# Patient Record
Sex: MALE | URBAN - NONMETROPOLITAN AREA
[De-identification: names, ages, dates, MRNs, and addresses within clinical notes are randomized per-mention and may not be internally consistent; named-entity substitution may affect disease eponyms.]

---

## 2018-07-17 ENCOUNTER — APPOINTMENT (OUTPATIENT)
Age: 63
Setting detail: DERMATOLOGY
End: 2018-07-17

## 2018-07-17 DIAGNOSIS — L82.0 INFLAMED SEBORRHEIC KERATOSIS: ICD-10-CM

## 2018-07-17 DIAGNOSIS — L91.8 OTHER HYPERTROPHIC DISORDERS OF THE SKIN: ICD-10-CM

## 2018-07-17 DIAGNOSIS — D22 MELANOCYTIC NEVI: ICD-10-CM

## 2018-07-17 PROBLEM — D22.9 MELANOCYTIC NEVI, UNSPECIFIED: Status: ACTIVE | Noted: 2018-07-17

## 2018-07-17 PROBLEM — D22.5 MELANOCYTIC NEVI OF TRUNK: Status: ACTIVE | Noted: 2018-07-17

## 2018-07-17 PROBLEM — D48.5 NEOPLASM OF UNCERTAIN BEHAVIOR OF SKIN: Status: ACTIVE | Noted: 2018-07-17

## 2018-07-17 PROCEDURE — OTHER REASSURANCE: OTHER

## 2018-07-17 PROCEDURE — 99203 OFFICE O/P NEW LOW 30 MIN: CPT | Mod: 25

## 2018-07-17 PROCEDURE — OTHER BIOPSY BY SHAVE METHOD: OTHER

## 2018-07-17 PROCEDURE — 11101: CPT

## 2018-07-17 PROCEDURE — 11100: CPT

## 2018-07-17 PROCEDURE — OTHER COUNSELING: OTHER

## 2018-07-17 PROCEDURE — OTHER MIPS QUALITY: OTHER

## 2018-07-17 ASSESSMENT — LOCATION SIMPLE DESCRIPTION DERM
LOCATION SIMPLE: RIGHT SHOULDER
LOCATION SIMPLE: LEFT ANTERIOR NECK
LOCATION SIMPLE: SCALP
LOCATION SIMPLE: RIGHT CLAVICULAR SKIN
LOCATION SIMPLE: LEFT UPPER BACK
LOCATION SIMPLE: RIGHT ANTERIOR NECK
LOCATION SIMPLE: LEFT CLAVICULAR SKIN

## 2018-07-17 ASSESSMENT — LOCATION DETAILED DESCRIPTION DERM
LOCATION DETAILED: RIGHT CLAVICULAR SKIN
LOCATION DETAILED: LEFT SUPERIOR MEDIAL UPPER BACK
LOCATION DETAILED: RIGHT CLAVICULAR NECK
LOCATION DETAILED: LEFT CLAVICULAR SKIN
LOCATION DETAILED: LEFT CLAVICULAR NECK
LOCATION DETAILED: RIGHT ANTERIOR SHOULDER
LOCATION DETAILED: LEFT INFERIOR OCCIPITAL SCALP

## 2018-07-17 ASSESSMENT — LOCATION ZONE DERM
LOCATION ZONE: ARM
LOCATION ZONE: SCALP
LOCATION ZONE: NECK
LOCATION ZONE: TRUNK

## 2018-07-17 NOTE — PROCEDURE: BIOPSY BY SHAVE METHOD
Size Of Lesion In Cm: 0
Bill 53421 For Specimen Handling/Conveyance To Laboratory?: no
Wound Care: Bacitracin
Was A Bandage Applied: Yes
Biopsy Type: H and E
Electrodesiccation Text: The wound bed was treated with electrodesiccation after the biopsy was performed.
Biopsy Method: Dermablade
Anesthesia Type: 1% lidocaine with epinephrine and a 1:10 solution of 8.4% sodium bicarbonate
Hemostasis: Aluminum Chloride
Electrodesiccation And Curettage Text: The wound bed was treated with electrodesiccation and curettage after the biopsy was performed.
Detail Level: Detailed
Post-Care Instructions: I reviewed with the patient in detail post-care instructions. Patient is to keep the biopsy site dry overnight, and then apply bacitracin twice daily until healed.
Curettage Text: The wound bed was treated with curettage after the biopsy was performed.
Silver Nitrate Text: The wound bed was treated with silver nitrate after the biopsy was performed.
Consent: Written consent was obtained and risks were reviewed including but not limited to scarring, infection, bleeding, scabbing, incomplete removal, nerve damage and allergy to anesthesia.
Billing Type: Third-Party Bill
Notification Instructions: Patient will be notified of biopsy results. However, patient instructed to call the office if not contacted within 2 weeks.
Cryotherapy Text: The wound bed was treated with cryotherapy after the biopsy was performed.
Depth Of Biopsy: dermis
Type Of Destruction Used: Curettage
Anesthesia Volume In Cc (Will Not Render If 0): 0.5
Dressing: bandage

## 2018-07-17 NOTE — PROCEDURE: MIPS QUALITY
Detail Level: Detailed
Quality 226: Preventive Care And Screening: Tobacco Use: Screening And Cessation Intervention: Patient screened for tobacco and never smoked
Quality 130: Documentation Of Current Medications In The Medical Record: Current Medications Documented
Additional Notes: Pt denies flu and pneumonia vaccine and not interested in receiving one at this time.
Quality 111:Pneumonia Vaccination Status For Older Adults: Pneumococcal Vaccination not Administered or Previously Received, Reason not Otherwise Specified
Quality 110: Preventive Care And Screening: Influenza Immunization: Influenza Immunization not Administered for Documented Reasons.

## 2018-07-25 ENCOUNTER — RX ONLY (RX ONLY)
Age: 63
End: 2018-07-25

## 2018-07-25 RX ORDER — BENZOYL PEROXIDE 100 MG/ML
LIQUID TOPICAL BID
Qty: 1 | Refills: 2 | Status: ERX | COMMUNITY
Start: 2018-07-25

## 2018-07-25 RX ORDER — CLINDAMYCIN PHOSPHATE 10 MG/G
GEL TOPICAL BID
Qty: 1 | Refills: 2 | Status: ERX | COMMUNITY
Start: 2018-07-25

## 2018-08-06 ENCOUNTER — APPOINTMENT (OUTPATIENT)
Age: 63
Setting detail: DERMATOLOGY
End: 2018-08-06

## 2018-08-06 DIAGNOSIS — Z48.02 ENCOUNTER FOR REMOVAL OF SUTURES: ICD-10-CM

## 2018-08-06 PROCEDURE — OTHER OBSERVATION: OTHER

## 2018-08-06 PROCEDURE — OTHER SUTURE REMOVAL (GLOBAL PERIOD): OTHER

## 2018-08-06 PROCEDURE — 99024 POSTOP FOLLOW-UP VISIT: CPT

## 2018-08-06 ASSESSMENT — LOCATION SIMPLE DESCRIPTION DERM
LOCATION SIMPLE: SCALP
LOCATION SIMPLE: LEFT UPPER BACK

## 2018-08-06 ASSESSMENT — LOCATION ZONE DERM
LOCATION ZONE: TRUNK
LOCATION ZONE: SCALP

## 2018-08-06 ASSESSMENT — LOCATION DETAILED DESCRIPTION DERM
LOCATION DETAILED: LEFT SUPERIOR MEDIAL UPPER BACK
LOCATION DETAILED: LEFT INFERIOR OCCIPITAL SCALP

## 2018-08-06 NOTE — PROCEDURE: SUTURE REMOVAL (GLOBAL PERIOD)
Detail Level: Detailed
Add 06310 Cpt? (Important Note: In 2017 The Use Of 69048 Is Being Tracked By Cms To Determine Future Global Period Reimbursement For Global Periods): yes

## 2020-11-05 NOTE — PROGRESS NOTES
"Patient: Carmelo Arnold    YOB: 1955    Date: 11/06/2020    Primary Care Provider: No primary care provider on file.    No chief complaint on file.      SUBJECTIVE:    History of present illness:  I saw the patient in the office today as a consultation for evaluation and treatment of ***    {Hx  Review:67620}    Review of Systems    History:  No past medical history on file.    No past surgical history on file.    No family history on file.    Social History     Tobacco Use   • Smoking status: Not on file   Substance Use Topics   • Alcohol use: Not on file   • Drug use: Not on file       Allergies:  Not on File    Medications:  No current outpatient medications on file.    OBJECTIVE:    Vital Signs:   There were no vitals filed for this visit.    Physical Exam:   General Appearance:    Alert, cooperative, in no acute distress   Head:    Normocephalic, without obvious abnormality, atraumatic   Eyes:            Lids and lashes normal, conjunctivae and sclerae normal, no   icterus, no pallor, corneas clear, PERRLA   Ears:    Ears appear intact with no abnormalities noted   Throat:   No oral lesions, no thrush, oral mucosa moist   Neck:   No adenopathy, supple, trachea midline, no thyromegaly, no   carotid bruit, no JVD   Lungs:     Clear to auscultation,respirations regular, even and                  unlabored    Heart:    Regular rhythm and normal rate, normal S1 and S2, no            murmur   Abdomen:     no masses, no organomegaly, soft non-tender, non-distended, no guarding, there is evidence of a ***   Extremities:   Moves all extremities well, no edema, no cyanosis, no             redness   Pulses:   Pulses palpable and equal bilaterally   Skin:   No bleeding, bruising or rash   Lymph nodes:   No palpable adenopathy   Neurologic:   Cranial nerves 2 - 12 grossly intact, sensation intact        Results Review:   {Results Review:86465::\"I reviewed the patient's new clinical results.\"}    {ROS " review:22079}    ASSESSMENT/PLAN:    No diagnosis found.    I had a detailed and extensive discussion with the patient in the office and they understand that they need to undergo hernia repair with mesh.  Full risks and benefits of operative versus nonoperative intervention were discussed with the patient and these included things such as nonresolution of symptoms and possible worsening of symptoms without surgical intervention versus infection, bleeding, possible recurrent hernia, possible postoperative neuralgia from nerve damage or involvement with scar tissue, etc.  The patient understands, agrees, and had no questions for me at the end of the office visit.     I discussed the patients findings and my recommendations with patient.    Electronically signed by Katrina Randall  11/05/20

## 2020-11-06 ENCOUNTER — OFFICE VISIT (OUTPATIENT)
Dept: SURGERY | Facility: CLINIC | Age: 65
End: 2020-11-06

## 2020-11-06 VITALS
BODY MASS INDEX: 22.84 KG/M2 | DIASTOLIC BLOOD PRESSURE: 70 MMHG | WEIGHT: 178 LBS | SYSTOLIC BLOOD PRESSURE: 122 MMHG | OXYGEN SATURATION: 99 % | HEIGHT: 74 IN | TEMPERATURE: 98.6 F | HEART RATE: 50 BPM

## 2020-11-06 DIAGNOSIS — K42.9 UMBILICAL HERNIA WITHOUT OBSTRUCTION AND WITHOUT GANGRENE: ICD-10-CM

## 2020-11-06 DIAGNOSIS — S39.011A STRAIN OF ABDOMINAL WALL, INITIAL ENCOUNTER: Primary | ICD-10-CM

## 2020-11-06 PROCEDURE — 99203 OFFICE O/P NEW LOW 30 MIN: CPT | Performed by: SURGERY

## 2020-11-06 RX ORDER — LEVOTHYROXINE SODIUM 0.07 MG/1
75 TABLET ORAL DAILY
COMMUNITY
End: 2021-07-28 | Stop reason: SDUPTHER

## 2020-11-06 RX ORDER — FINASTERIDE 5 MG/1
5 TABLET, FILM COATED ORAL DAILY
COMMUNITY
End: 2021-11-16

## 2020-11-06 RX ORDER — METOPROLOL SUCCINATE 25 MG/1
25 TABLET, EXTENDED RELEASE ORAL DAILY
COMMUNITY
End: 2021-06-08 | Stop reason: SDUPTHER

## 2020-11-06 RX ORDER — MONTELUKAST SODIUM 10 MG/1
10 TABLET ORAL DAILY
COMMUNITY
End: 2021-01-12

## 2020-11-06 RX ORDER — ROSUVASTATIN CALCIUM 10 MG/1
10 TABLET, COATED ORAL DAILY
COMMUNITY
End: 2021-04-20 | Stop reason: SDUPTHER

## 2020-11-06 NOTE — PROGRESS NOTES
Patient: Carmelo Arnold    YOB: 1955    Date: 11/06/2020    Primary Care Provider: Provider, No Known    Chief Complaint   Patient presents with   • Hernia       SUBJECTIVE:    History of present illness: Patient is in the office today for treatment and evaluation of a ventral and possible inguinal hernia.  Patient was moving recently and developed lower abdominal pain on both sides.  Special prolonged standing and walking.  Patient has abdominal diastases and umbilical hernia.  They are minimally symptomatic.  No change in bowel habits and recent colonoscopy was unremarkable.  No weight loss or anemia.  No rectal bleeding.    The following portions of the patient's history were reviewed and updated as appropriate: allergies, current medications, past family history, past medical history, past social history, past surgical history and problem list.      Review of Systems   Constitutional: Negative for chills, fever and unexpected weight change.   HENT: Negative for trouble swallowing and voice change.    Eyes: Negative for visual disturbance.   Respiratory: Negative for apnea, cough, chest tightness and wheezing.    Cardiovascular: Negative for chest pain, palpitations and leg swelling.   Gastrointestinal: Negative for abdominal distention, abdominal pain, anal bleeding, blood in stool, constipation, diarrhea, nausea, rectal pain and vomiting.   Endocrine: Negative for cold intolerance and heat intolerance.   Genitourinary: Negative for difficulty urinating, dysuria, flank pain and hematuria.   Musculoskeletal: Negative for back pain, gait problem and joint swelling.   Skin: Negative for color change, rash and wound.   Neurological: Negative for dizziness, syncope, speech difficulty, weakness, numbness and headaches.   Hematological: Negative for adenopathy. Does not bruise/bleed easily.   Psychiatric/Behavioral: Negative for confusion. The patient is not nervous/anxious.        Allergies:  No Known  "Allergies    Medications:    Current Outpatient Medications:   •  finasteride (PROSCAR) 5 MG tablet, finasteride 5 mg tablet, Disp: , Rfl:   •  levothyroxine (SYNTHROID, LEVOTHROID) 75 MCG tablet, levothyroxine 75 mcg tablet, Disp: , Rfl:   •  metoprolol succinate XL (TOPROL-XL) 25 MG 24 hr tablet, metoprolol succinate ER 25 mg tablet,extended release 24 hr, Disp: , Rfl:   •  montelukast (SINGULAIR) 10 MG tablet, montelukast 10 mg tablet, Disp: , Rfl:   •  rosuvastatin (CRESTOR) 10 MG tablet, rosuvastatin 10 mg tablet, Disp: , Rfl:     History:  Past Medical History:   Diagnosis Date   • Colon polyp        No past surgical history on file.    No family history on file.    Social History     Tobacco Use   • Smoking status: Never Smoker   • Smokeless tobacco: Never Used   Substance Use Topics   • Alcohol use: Never     Frequency: Never   • Drug use: Never        OBJECTIVE:    Vital Signs:   Vitals:    11/06/20 1535   BP: 122/70   Pulse: 50   Temp: 98.6 °F (37 °C)   SpO2: 99%   Weight: 80.7 kg (178 lb)   Height: 188 cm (74\")       Physical Exam:   General Appearance:    Alert, cooperative, in no acute distress   Head:    Normocephalic, without obvious abnormality, atraumatic   Eyes:            Lids and lashes normal, conjunctivae and sclerae normal, no   icterus, no pallor, corneas clear, PERRLA   Ears:    Ears appear intact with no abnormalities noted   Throat:   No oral lesions, no thrush, oral mucosa moist   Neck:   No adenopathy, supple, trachea midline, no thyromegaly, no   carotid bruit, no JVD   Lungs:     Clear to auscultation,respirations regular, even and                  unlabored    Heart:    Regular rhythm and normal rate, normal S1 and S2, no            murmur, no gallop, no rub, no click   Chest Wall:    No abnormalities observed   Abdomen:     Normal bowel sounds, no masses, no organomegaly, soft        non-tender, non-distended, no guarding, no rebound                Tenderness.  Small umbilical " hernia, reducible.  Upper midline diastases, nontender.  No palpable inguinal hernias.   Extremities:   Moves all extremities well, no edema, no cyanosis, no             redness   Pulses:   Pulses palpable and equal bilaterally   Skin:   No bleeding, bruising or rash   Lymph nodes:   No palpable adenopathy   Neurologic:   Cranial nerves 2 - 12 grossly intact, sensation intact, DTR       present and equal bilaterally     Results Review:   I reviewed the patient's new clinical results.    Review of Systems was reviewed and confirmed as accurate as documented by the MA.    ASSESSMENT/PLAN:    1. Strain of abdominal wall, initial encounter    2. Umbilical hernia without obstruction and without gangrene        Patient reassured, appears to have abdominal wall strain, recommend rest heat and ibuprofen.  Follow-up in 4 weeks if no improvement.  No evidence of hernia in the groin regions.    I discussed the patients findings and my recommendations with patient        Electronically signed by Negrita Herrera MD  11/06/20

## 2021-01-12 ENCOUNTER — OFFICE VISIT (OUTPATIENT)
Dept: INTERNAL MEDICINE | Facility: CLINIC | Age: 66
End: 2021-01-12

## 2021-01-12 VITALS
SYSTOLIC BLOOD PRESSURE: 126 MMHG | DIASTOLIC BLOOD PRESSURE: 72 MMHG | HEART RATE: 73 BPM | BODY MASS INDEX: 28.16 KG/M2 | HEIGHT: 74 IN | OXYGEN SATURATION: 99 % | WEIGHT: 219.4 LBS | TEMPERATURE: 97.5 F

## 2021-01-12 DIAGNOSIS — T78.40XD ALLERGY, SUBSEQUENT ENCOUNTER: ICD-10-CM

## 2021-01-12 DIAGNOSIS — E53.8 B12 DEFICIENCY: ICD-10-CM

## 2021-01-12 DIAGNOSIS — R00.2 PALPITATION: ICD-10-CM

## 2021-01-12 DIAGNOSIS — G89.29 CHRONIC PAIN OF RIGHT KNEE: ICD-10-CM

## 2021-01-12 DIAGNOSIS — I49.1 PAC (PREMATURE ATRIAL CONTRACTION): ICD-10-CM

## 2021-01-12 DIAGNOSIS — N40.1 BENIGN PROSTATIC HYPERPLASIA WITH LOWER URINARY TRACT SYMPTOMS, SYMPTOM DETAILS UNSPECIFIED: ICD-10-CM

## 2021-01-12 DIAGNOSIS — K43.9 VENTRAL HERNIA WITHOUT OBSTRUCTION OR GANGRENE: ICD-10-CM

## 2021-01-12 DIAGNOSIS — R06.02 SOB (SHORTNESS OF BREATH) ON EXERTION: ICD-10-CM

## 2021-01-12 DIAGNOSIS — E55.9 VITAMIN D DEFICIENCY: ICD-10-CM

## 2021-01-12 DIAGNOSIS — M25.561 CHRONIC PAIN OF RIGHT KNEE: ICD-10-CM

## 2021-01-12 DIAGNOSIS — E03.9 HYPOTHYROIDISM, UNSPECIFIED TYPE: Primary | ICD-10-CM

## 2021-01-12 DIAGNOSIS — K63.5 BENIGN COLON POLYP: ICD-10-CM

## 2021-01-12 DIAGNOSIS — E78.5 HYPERLIPIDEMIA, UNSPECIFIED HYPERLIPIDEMIA TYPE: ICD-10-CM

## 2021-01-12 PROBLEM — T78.40XA ALLERGY: Status: ACTIVE | Noted: 2021-01-12

## 2021-01-12 PROCEDURE — 99204 OFFICE O/P NEW MOD 45 MIN: CPT | Performed by: INTERNAL MEDICINE

## 2021-01-12 RX ORDER — MELATONIN
1000 DAILY
COMMUNITY
End: 2021-03-11

## 2021-01-12 RX ORDER — LANOLIN ALCOHOL/MO/W.PET/CERES
1000 CREAM (GRAM) TOPICAL DAILY
COMMUNITY

## 2021-01-12 NOTE — PROGRESS NOTES
Subjective   Carmelo Arnold is a 65 y.o. male.     Chief Complaint   Patient presents with   • Establish Care   • Knee Pain     onset a week ago, pt c/o knee pain       History of Present Illness   Patient here for establishing care.  Patient complains some knee joint pain popping sensation occasional aching sensation.  Pain is chronic.  Patient also has history of BPH on medication.  Hypothyroidism stable on medication.  Hyperlipidemia stable on medication.  Vitamin deficiency on supplement is stable.  Patient also complains of some skipped beats palpitation in the past.  No significant short of breath upon exertion.    Current Outpatient Medications:   •  cholecalciferol (VITAMIN D3) 25 MCG (1000 UT) tablet, Take 1,000 Units by mouth Daily., Disp: , Rfl:   •  finasteride (PROSCAR) 5 MG tablet, Take 5 mg by mouth Daily., Disp: , Rfl:   •  levothyroxine (SYNTHROID, LEVOTHROID) 75 MCG tablet, Take 75 mcg by mouth Daily., Disp: , Rfl:   •  metoprolol succinate XL (TOPROL-XL) 25 MG 24 hr tablet, Take 25 mg by mouth Daily., Disp: , Rfl:   •  rosuvastatin (CRESTOR) 10 MG tablet, Take 10 mg by mouth Daily., Disp: , Rfl:   •  vitamin B-12 (CYANOCOBALAMIN) 1000 MCG tablet, Take 1,000 mcg by mouth Daily., Disp: , Rfl:     The following portions of the patient's history were reviewed and updated as appropriate: allergies, current medications, past family history, past medical history, past social history, past surgical history and problem list.    Review of Systems   Constitutional: Negative.    HENT: Negative.    Eyes: Negative.    Respiratory: Negative.    Cardiovascular: Negative.    Gastrointestinal: Negative.    Endocrine: Negative.    Genitourinary: Negative.    Musculoskeletal: Negative.    Skin: Negative.    Allergic/Immunologic: Negative.    Neurological: Negative.    Hematological: Negative.    Psychiatric/Behavioral: Negative.    All other systems reviewed and are negative.      Objective   Physical Exam  Neck:       Musculoskeletal: Neck supple.   Cardiovascular:      Rate and Rhythm: Normal rate and regular rhythm.      Heart sounds: Normal heart sounds.   Pulmonary:      Effort: Pulmonary effort is normal.      Breath sounds: Normal breath sounds.   Abdominal:      General: Bowel sounds are normal.   Skin:     General: Skin is warm.   Neurological:      Mental Status: He is alert and oriented to person, place, and time.         Cbc, cmp, flp tsh from [facility]  have been reviewed.    Assessment/Plan   Diagnoses and all orders for this visit:    Hypothyroidism, unspecified type continue medication    Hyperlipidemia, unspecified hyperlipidemia type continue medication    Vitamin D deficiency continue supplement need the blood tests next    Benign prostatic hyperplasia with lower urinary tract symptoms, symptom details unspecified continue medication patient requests to be referred to another urologist  -     Ambulatory Referral to Urology    B12 deficiency check lab next    Allergy, subsequent encounter discontinue Singulair change to Allegra patient does have nighttime rhinorrhea occasionally    Ventral hernia without obstruction or gangrene seen by general surgeon no surgical indication    PAC (premature atrial contraction) history of work-up by cardiologist need the records    Benign colon polyp patient states colonoscopy October 2020 need the records    Chronic pain of right knee counseling for exercise of the knees    Palpitation obtain records        Other orders  -     vitamin B-12 (CYANOCOBALAMIN) 1000 MCG tablet; Take 1,000 mcg by mouth Daily.  -     cholecalciferol (VITAMIN D3) 25 MCG (1000 UT) tablet; Take 1,000 Units by mouth Daily.    2 mo review records , wellness done 10/2020    May need to do more tests need to review old records next time to formulate treatment plan.

## 2021-03-11 ENCOUNTER — OFFICE VISIT (OUTPATIENT)
Dept: UROLOGY | Facility: CLINIC | Age: 66
End: 2021-03-11

## 2021-03-11 ENCOUNTER — LAB (OUTPATIENT)
Dept: LAB | Facility: HOSPITAL | Age: 66
End: 2021-03-11

## 2021-03-11 VITALS
OXYGEN SATURATION: 94 % | WEIGHT: 211 LBS | SYSTOLIC BLOOD PRESSURE: 126 MMHG | HEART RATE: 76 BPM | TEMPERATURE: 97.1 F | HEIGHT: 74 IN | DIASTOLIC BLOOD PRESSURE: 70 MMHG | BODY MASS INDEX: 27.08 KG/M2

## 2021-03-11 DIAGNOSIS — R97.20 ELEVATED PSA: ICD-10-CM

## 2021-03-11 DIAGNOSIS — N40.0 BENIGN PROSTATIC HYPERPLASIA, UNSPECIFIED WHETHER LOWER URINARY TRACT SYMPTOMS PRESENT: Primary | ICD-10-CM

## 2021-03-11 LAB
BILIRUB BLD-MCNC: NEGATIVE MG/DL
CLARITY, POC: CLEAR
COLOR UR: YELLOW
GLUCOSE UR STRIP-MCNC: NEGATIVE MG/DL
KETONES UR QL: NEGATIVE
LEUKOCYTE EST, POC: NEGATIVE
NITRITE UR-MCNC: NEGATIVE MG/ML
PH UR: 6 [PH] (ref 5–8)
PROT UR STRIP-MCNC: NEGATIVE MG/DL
RBC # UR STRIP: NEGATIVE /UL
SP GR UR: 1.02 (ref 1–1.03)
UROBILINOGEN UR QL: NORMAL

## 2021-03-11 PROCEDURE — 84154 ASSAY OF PSA FREE: CPT

## 2021-03-11 PROCEDURE — 99204 OFFICE O/P NEW MOD 45 MIN: CPT | Performed by: UROLOGY

## 2021-03-11 PROCEDURE — 84153 ASSAY OF PSA TOTAL: CPT

## 2021-03-11 PROCEDURE — 51798 US URINE CAPACITY MEASURE: CPT | Performed by: UROLOGY

## 2021-03-11 PROCEDURE — 36415 COLL VENOUS BLD VENIPUNCTURE: CPT

## 2021-03-11 PROCEDURE — 81003 URINALYSIS AUTO W/O SCOPE: CPT | Performed by: UROLOGY

## 2021-03-11 NOTE — PROGRESS NOTES
"Chief Complaint  LUTS    Referring Provider  Salas Meléndez MD    HPI  Mr. Arnold is a 65 y.o. male with history of PAT and urinary symptoms on finasteride for approximately 1 year who presents with urinary frequency. He denies burning or blood when urinating. He denies family history of prostate cancer. He reports that his \"PSA was high,\" when checked by his previous physician in [place]. He adds that a prostate biopsy was performed 8 years ago, and nothing was found. He reports that he had an ultrasound of his prostate 2 years ago. He also had another biopsy 15 years ago, and reports that nothing was found.     Primary symptom includes: Urinary frequency  Patient denies   Onset was 1 year ago .    Previous treatments include: finasteride    IPSS Questionnaire (AUA-7):  Over the past month…    1)  Incomplete Emptying  How often have you had a sensation of not emptying your bladder?  1 - Less than 1 time in 5   2)  Frequency  How often have you had to urinate less than every two hours? 3 - About half the time   3)  Intermittency  How often have you found you stopped and started again several times when you urinated?  1 - Less than 1 time in 5   4) Urgency  How often have you found it difficult to postpone urination?  0 - Not at all   5) Weak Stream  How often have you had a weak urinary stream?  1 - Less than 1 time in 5   6) Straining  How often have you had to push or strain to begin urination?  1 - Less than 1 time in 5   7) Nocturia  How many times did you typically get up at night to urinate?  1 - 1 time   Total Score:  8       Quality of life due to urinary symptoms:  If you were to spend the rest of your life with your urinary condition the way it is now, how would you feel about that? 2-Mostly Satisfied   Urine Leakage (Incontinence) 0-No Leakage       Past Medical History  Past Medical History:   Diagnosis Date   • Acid reflux    • Benign colon polyp 1/12/2021   • Colon polyp    • Colon polyp    • Heart " "murmur        Past Surgical History  Past Surgical History:   Procedure Laterality Date   • COLONOSCOPY         Medications    Current Outpatient Medications:   •  finasteride (PROSCAR) 5 MG tablet, Take 5 mg by mouth Daily., Disp: , Rfl:   •  levothyroxine (SYNTHROID, LEVOTHROID) 75 MCG tablet, Take 75 mcg by mouth Daily., Disp: , Rfl:   •  metoprolol succinate XL (TOPROL-XL) 25 MG 24 hr tablet, Take 25 mg by mouth Daily., Disp: , Rfl:   •  rosuvastatin (CRESTOR) 10 MG tablet, Take 10 mg by mouth Daily., Disp: , Rfl:   •  vitamin B-12 (CYANOCOBALAMIN) 1000 MCG tablet, Take 1,000 mcg by mouth Daily., Disp: , Rfl:     Allergies  No Known Allergies    Social History  Social History     Socioeconomic History   • Marital status:      Spouse name: Not on file   • Number of children: Not on file   • Years of education: Not on file   • Highest education level: Not on file   Tobacco Use   • Smoking status: Never Smoker   • Smokeless tobacco: Never Used   Substance and Sexual Activity   • Alcohol use: Yes     Comment: Rarely    • Drug use: Never   • Sexual activity: Defer       Family History  He has no family history of prostate cancer  Family History   Problem Relation Age of Onset   • Arthritis Mother    • Breast cancer Sister    • Migraines Daughter        Review of Systems  A review of systems was notable for lower urinary tract symptoms.    Physical Exam  Visit Vitals  /70   Pulse 76   Temp 97.1 °F (36.2 °C)   Ht 188 cm (74\")   Wt 95.7 kg (211 lb)   SpO2 94%   BMI 27.09 kg/m²     Physical exam notable for abdominal scars and a small umbilical hernia.    Genitourinary  Penis: circumcised penis, glans normal, no penile discharge.  No rashes/lesions.    Testes: descended bilaterally, no masses, nontender to palpation. Remainder of scrotal contents normal. No hernia appreciated.  Rectal:  Normal tone, no masses.  Prostate:  30 grams.  Symmetric, non-tender, anodular and no induration.      Labs  No results " found for: PSA     On review of outside records, his PSA on 09/17/2019 was 6.9. The free was 1.5, the percent free was 21.7.    Brief Urine Lab Results  (Last result in the past 365 days)      Color   Clarity   Blood   Leuk Est   Nitrite   Protein   CREAT   Urine HCG        03/11/21 1613 Yellow Clear Negative Negative Negative Negative             PVR  Post-void residual performed by staff - 0 mL.      Assessment  Mr. Arnold is a 65 y.o. male who presents with LUTS, primarily frequency and urgency.  He has a history of elevated PSA and reported prior negative biopsies over 8 years ago.  His elevated PSA at this point from the previous urologist is of unclear significance to me.    Plan  1.  Repeat total and free PSA  2. Follow up in 2 weeks to discuss MRI of prostate and possible biopsy in the future.     Scribed for Eduardo Pandya MD by VIVIANE REYES.  03/12/21   08:58 EST    I have personally performed the services described in this document as scribed by the above individual, and it is both accurate and complete.  Eduardo Pandya MD  3/12/2021  15:54 EST

## 2021-03-13 LAB
PSA FREE MFR SERPL: 22.6 %
PSA FREE SERPL-MCNC: 0.7 NG/ML
PSA SERPL-MCNC: 3.1 NG/ML (ref 0–4)

## 2021-03-15 ENCOUNTER — OFFICE VISIT (OUTPATIENT)
Dept: INTERNAL MEDICINE | Facility: CLINIC | Age: 66
End: 2021-03-15

## 2021-03-15 VITALS
TEMPERATURE: 96.7 F | WEIGHT: 217 LBS | BODY MASS INDEX: 27.85 KG/M2 | OXYGEN SATURATION: 98 % | SYSTOLIC BLOOD PRESSURE: 122 MMHG | HEART RATE: 76 BPM | DIASTOLIC BLOOD PRESSURE: 80 MMHG | HEIGHT: 74 IN

## 2021-03-15 DIAGNOSIS — M25.561 CHRONIC PAIN OF RIGHT KNEE: ICD-10-CM

## 2021-03-15 DIAGNOSIS — G89.29 CHRONIC PAIN OF RIGHT KNEE: ICD-10-CM

## 2021-03-15 DIAGNOSIS — E53.8 B12 DEFICIENCY: ICD-10-CM

## 2021-03-15 DIAGNOSIS — I49.1 PAC (PREMATURE ATRIAL CONTRACTION): ICD-10-CM

## 2021-03-15 DIAGNOSIS — E03.9 HYPOTHYROIDISM, UNSPECIFIED TYPE: ICD-10-CM

## 2021-03-15 DIAGNOSIS — E55.9 VITAMIN D DEFICIENCY: ICD-10-CM

## 2021-03-15 DIAGNOSIS — T78.40XD ALLERGY, SUBSEQUENT ENCOUNTER: Primary | ICD-10-CM

## 2021-03-15 DIAGNOSIS — N40.1 BENIGN PROSTATIC HYPERPLASIA WITH LOWER URINARY TRACT SYMPTOMS, SYMPTOM DETAILS UNSPECIFIED: ICD-10-CM

## 2021-03-15 DIAGNOSIS — E78.5 HYPERLIPIDEMIA, UNSPECIFIED HYPERLIPIDEMIA TYPE: ICD-10-CM

## 2021-03-15 PROBLEM — R00.2 PALPITATION: Status: RESOLVED | Noted: 2021-01-12 | Resolved: 2021-03-15

## 2021-03-15 PROCEDURE — 99214 OFFICE O/P EST MOD 30 MIN: CPT | Performed by: INTERNAL MEDICINE

## 2021-03-15 NOTE — PROGRESS NOTES
Subjective   Carmelo Arnold is a 65 y.o. male.     Chief Complaint   Patient presents with   • Thyroid Problem   • Hypertension   • Hyperlipidemia       History of Present Illness     Patient here for follow-up of.  Allergies stable.  Hyperlipidemia stable on medication needed blood tests.  Vitamin D stable on supplement.  Hypothyroidism stable on medication need blood tests.  BPH history of a high PSA normal now patient is seeing urology.  Knee joint pain stable improved.  No more palpitation.  History of a PAC on metoprolol improved.    Current Outpatient Medications:   •  levothyroxine (SYNTHROID, LEVOTHROID) 75 MCG tablet, Take 75 mcg by mouth Daily., Disp: , Rfl:   •  metoprolol succinate XL (TOPROL-XL) 25 MG 24 hr tablet, Take 25 mg by mouth Daily., Disp: , Rfl:   •  rosuvastatin (CRESTOR) 10 MG tablet, Take 10 mg by mouth Daily., Disp: , Rfl:   •  vitamin B-12 (CYANOCOBALAMIN) 1000 MCG tablet, Take 1,000 mcg by mouth Daily., Disp: , Rfl:   •  finasteride (PROSCAR) 5 MG tablet, Take 5 mg by mouth Daily., Disp: , Rfl:     The following portions of the patient's history were reviewed and updated as appropriate: allergies, current medications, past family history, past medical history, past social history, past surgical history and problem list.    Review of Systems   Constitutional: Negative.    Respiratory: Negative.    Cardiovascular: Negative.    Gastrointestinal: Negative.    Musculoskeletal: Negative.    Skin: Negative.    Neurological: Negative.    Psychiatric/Behavioral: Negative.        Objective   Physical Exam  Cardiovascular:      Rate and Rhythm: Normal rate and regular rhythm.      Heart sounds: Normal heart sounds.   Pulmonary:      Effort: Pulmonary effort is normal.      Breath sounds: Normal breath sounds.   Abdominal:      General: Bowel sounds are normal.   Musculoskeletal:      Cervical back: Neck supple.   Skin:     General: Skin is warm.   Neurological:      Mental Status: He is alert and  oriented to person, place, and time.         All tests have been reviewed.    Assessment/Plan   Diagnoses and all orders for this visit:    Allergy, subsequent encounter continue medication    Hyperlipidemia, unspecified hyperlipidemia type continue medication  -     Comprehensive Metabolic Panel  -     CK  -     CBC & Differential  -     Lipid Panel    Vitamin D deficiency continue supplement  -     Vitamin D 25 Hydroxy    Hypothyroidism, unspecified type continue medication  -     TSH    Benign prostatic hyperplasia with lower urinary tract symptoms, symptom details unspecified follow-up with urology for history of high PSA    Chronic pain of right knee improved    PAC (premature atrial contraction) continue metoprolol    1 months follow-up physical in October         Below is to historical records for reference only: Ventral hernia without obstruction or gangrene seen by general surgeon no surgical indication     PAC (premature atrial contraction) history of work-up by cardiologist on metoprolol stable now     Benign colon polyp patient states colonoscopy 2/2020      Chronic pain of right knee counseling for exercise of the knees     PAC stable on med      colon done 2/2020  Over the records reviewed       wellness 10/2021, vaccination done please see record

## 2021-03-16 LAB
25(OH)D3+25(OH)D2 SERPL-MCNC: 38.6 NG/ML (ref 30–100)
ALBUMIN SERPL-MCNC: 4.1 G/DL (ref 3.5–5.2)
ALBUMIN/GLOB SERPL: 1.6 G/DL
ALP SERPL-CCNC: 52 U/L (ref 39–117)
ALT SERPL-CCNC: 14 U/L (ref 1–41)
AST SERPL-CCNC: 16 U/L (ref 1–40)
BASOPHILS # BLD AUTO: 0.04 10*3/MM3 (ref 0–0.2)
BASOPHILS NFR BLD AUTO: 0.6 % (ref 0–1.5)
BILIRUB SERPL-MCNC: 0.4 MG/DL (ref 0–1.2)
BUN SERPL-MCNC: 17 MG/DL (ref 8–23)
BUN/CREAT SERPL: 16.7 (ref 7–25)
CALCIUM SERPL-MCNC: 9.1 MG/DL (ref 8.6–10.5)
CHLORIDE SERPL-SCNC: 103 MMOL/L (ref 98–107)
CHOLEST SERPL-MCNC: 168 MG/DL (ref 0–200)
CK SERPL-CCNC: 149 U/L (ref 20–200)
CO2 SERPL-SCNC: 27.6 MMOL/L (ref 22–29)
CREAT SERPL-MCNC: 1.02 MG/DL (ref 0.76–1.27)
EOSINOPHIL # BLD AUTO: 0.3 10*3/MM3 (ref 0–0.4)
EOSINOPHIL NFR BLD AUTO: 4.7 % (ref 0.3–6.2)
ERYTHROCYTE [DISTWIDTH] IN BLOOD BY AUTOMATED COUNT: 13.4 % (ref 12.3–15.4)
GLOBULIN SER CALC-MCNC: 2.5 GM/DL
GLUCOSE SERPL-MCNC: 116 MG/DL (ref 65–99)
HCT VFR BLD AUTO: 42.4 % (ref 37.5–51)
HDLC SERPL-MCNC: 47 MG/DL (ref 40–60)
HGB BLD-MCNC: 14.3 G/DL (ref 13–17.7)
IMM GRANULOCYTES # BLD AUTO: 0.03 10*3/MM3 (ref 0–0.05)
IMM GRANULOCYTES NFR BLD AUTO: 0.5 % (ref 0–0.5)
LDLC SERPL CALC-MCNC: 92 MG/DL (ref 0–100)
LYMPHOCYTES # BLD AUTO: 1.98 10*3/MM3 (ref 0.7–3.1)
LYMPHOCYTES NFR BLD AUTO: 30.9 % (ref 19.6–45.3)
MCH RBC QN AUTO: 29.1 PG (ref 26.6–33)
MCHC RBC AUTO-ENTMCNC: 33.7 G/DL (ref 31.5–35.7)
MCV RBC AUTO: 86.4 FL (ref 79–97)
MONOCYTES # BLD AUTO: 0.72 10*3/MM3 (ref 0.1–0.9)
MONOCYTES NFR BLD AUTO: 11.3 % (ref 5–12)
NEUTROPHILS # BLD AUTO: 3.33 10*3/MM3 (ref 1.7–7)
NEUTROPHILS NFR BLD AUTO: 52 % (ref 42.7–76)
NRBC BLD AUTO-RTO: 0 /100 WBC (ref 0–0.2)
PLATELET # BLD AUTO: 228 10*3/MM3 (ref 140–450)
POTASSIUM SERPL-SCNC: 4.4 MMOL/L (ref 3.5–5.2)
PROT SERPL-MCNC: 6.6 G/DL (ref 6–8.5)
RBC # BLD AUTO: 4.91 10*6/MM3 (ref 4.14–5.8)
SODIUM SERPL-SCNC: 140 MMOL/L (ref 136–145)
TRIGL SERPL-MCNC: 167 MG/DL (ref 0–150)
TSH SERPL DL<=0.005 MIU/L-ACNC: 4.18 UIU/ML (ref 0.27–4.2)
VLDLC SERPL CALC-MCNC: 29 MG/DL (ref 5–40)
WBC # BLD AUTO: 6.4 10*3/MM3 (ref 3.4–10.8)

## 2021-03-26 ENCOUNTER — OFFICE VISIT (OUTPATIENT)
Dept: UROLOGY | Facility: CLINIC | Age: 66
End: 2021-03-26

## 2021-03-26 DIAGNOSIS — R97.20 ELEVATED PSA: Primary | ICD-10-CM

## 2021-03-26 PROCEDURE — 99441 PR PHYS/QHP TELEPHONE EVALUATION 5-10 MIN: CPT | Performed by: UROLOGY

## 2021-03-26 NOTE — PROGRESS NOTES
"Chief Complaint  LUTS    Referring Provider  No ref. provider found    HPI  Mr. Arnold is a 65 y.o. male with history of PAT and urinary symptoms on finasteride for approximately 1 year who presents with urinary frequency. He denies burning or blood when urinating. He denies family history of prostate cancer. He reports that his \"PSA was high,\" when checked by his previous physician in [place]. He adds that a prostate biopsy was performed 8 years ago, and nothing was found. He reports that he had an ultrasound of his prostate 2 years ago. He also had another biopsy 15 years ago, and reports that nothing was found.     Primary symptom includes: Urinary frequency  Patient denies   Onset was 1 year ago .    Previous treatments include: finasteride    IPSS Questionnaire (AUA-7):  Over the past month…    1)  Incomplete Emptying  How often have you had a sensation of not emptying your bladder?  1 - Less than 1 time in 5   2)  Frequency  How often have you had to urinate less than every two hours? 3 - About half the time   3)  Intermittency  How often have you found you stopped and started again several times when you urinated?  1 - Less than 1 time in 5   4) Urgency  How often have you found it difficult to postpone urination?  0 - Not at all   5) Weak Stream  How often have you had a weak urinary stream?  1 - Less than 1 time in 5   6) Straining  How often have you had to push or strain to begin urination?  1 - Less than 1 time in 5   7) Nocturia  How many times did you typically get up at night to urinate?  1 - 1 time   Total Score:  8       Quality of life due to urinary symptoms:  If you were to spend the rest of your life with your urinary condition the way it is now, how would you feel about that? 2-Mostly Satisfied   Urine Leakage (Incontinence) 0-No Leakage       Past Medical History  Past Medical History:   Diagnosis Date   • Acid reflux    • Benign colon polyp 1/12/2021   • Colon polyp    • Colon polyp    • " Heart murmur        Past Surgical History  Past Surgical History:   Procedure Laterality Date   • COLONOSCOPY         Medications    Current Outpatient Medications:   •  finasteride (PROSCAR) 5 MG tablet, Take 5 mg by mouth Daily., Disp: , Rfl:   •  levothyroxine (SYNTHROID, LEVOTHROID) 75 MCG tablet, Take 75 mcg by mouth Daily., Disp: , Rfl:   •  metoprolol succinate XL (TOPROL-XL) 25 MG 24 hr tablet, Take 25 mg by mouth Daily., Disp: , Rfl:   •  rosuvastatin (CRESTOR) 10 MG tablet, Take 10 mg by mouth Daily., Disp: , Rfl:   •  vitamin B-12 (CYANOCOBALAMIN) 1000 MCG tablet, Take 1,000 mcg by mouth Daily., Disp: , Rfl:     Allergies  No Known Allergies    Social History  Social History     Socioeconomic History   • Marital status:      Spouse name: Not on file   • Number of children: Not on file   • Years of education: Not on file   • Highest education level: Not on file   Tobacco Use   • Smoking status: Never Smoker   • Smokeless tobacco: Never Used   Substance and Sexual Activity   • Alcohol use: Yes     Comment: Rarely    • Drug use: Never   • Sexual activity: Defer       Family History  He has no family history of prostate cancer  Family History   Problem Relation Age of Onset   • Arthritis Mother    • Breast cancer Sister    • Migraines Daughter        Review of Systems  A review of systems was notable for lower urinary tract symptoms.    Physical Exam  There were no vitals taken for this visit.      Labs  Lab Results   Component Value Date    PSA 3.1 03/11/2021        On review of outside records, his PSA on 09/17/2019 was 6.9. The free was 1.5, the percent free was 21.7.    Brief Urine Lab Results  (Last result in the past 365 days)      Color   Clarity   Blood   Leuk Est   Nitrite   Protein   CREAT   Urine HCG        03/11/21 1613 Yellow Clear Negative Negative Negative Negative                   Assessment  Mr. Arnold is a 65 y.o. male who presents with LUTS, primarily frequency and urgency.  He has a  history of elevated PSA and reported prior negative biopsies over 8 years ago.  His elevated PSA at this point from the previous urologist is of unclear significance to me.    Repeat corrected PSA is 6.2 based on the fact that he is on finasteride.    Plan  1.   MRI of prostate and possible biopsy in the future.     This visit has been rescheduled as a phone visit to comply with patient safety concerns in accordance with CDC recommendations. Total time of discussion was 5 minutes.

## 2021-04-20 ENCOUNTER — OFFICE VISIT (OUTPATIENT)
Dept: INTERNAL MEDICINE | Facility: CLINIC | Age: 66
End: 2021-04-20

## 2021-04-20 VITALS
SYSTOLIC BLOOD PRESSURE: 140 MMHG | HEART RATE: 54 BPM | DIASTOLIC BLOOD PRESSURE: 90 MMHG | BODY MASS INDEX: 28.11 KG/M2 | HEIGHT: 74 IN | TEMPERATURE: 97.7 F | WEIGHT: 219 LBS | OXYGEN SATURATION: 99 %

## 2021-04-20 DIAGNOSIS — M25.561 CHRONIC PAIN OF RIGHT KNEE: ICD-10-CM

## 2021-04-20 DIAGNOSIS — K43.9 VENTRAL HERNIA WITHOUT OBSTRUCTION OR GANGRENE: ICD-10-CM

## 2021-04-20 DIAGNOSIS — E03.9 HYPOTHYROIDISM, UNSPECIFIED TYPE: ICD-10-CM

## 2021-04-20 DIAGNOSIS — E78.5 HYPERLIPIDEMIA, UNSPECIFIED HYPERLIPIDEMIA TYPE: ICD-10-CM

## 2021-04-20 DIAGNOSIS — G89.29 CHRONIC PAIN OF RIGHT KNEE: ICD-10-CM

## 2021-04-20 DIAGNOSIS — E53.8 B12 DEFICIENCY: ICD-10-CM

## 2021-04-20 DIAGNOSIS — Z00.00 WELLNESS EXAMINATION: ICD-10-CM

## 2021-04-20 DIAGNOSIS — N40.1 BENIGN PROSTATIC HYPERPLASIA WITH LOWER URINARY TRACT SYMPTOMS, SYMPTOM DETAILS UNSPECIFIED: ICD-10-CM

## 2021-04-20 DIAGNOSIS — E55.9 VITAMIN D DEFICIENCY: ICD-10-CM

## 2021-04-20 DIAGNOSIS — T78.40XD ALLERGY, SUBSEQUENT ENCOUNTER: Primary | ICD-10-CM

## 2021-04-20 DIAGNOSIS — I49.1 PAC (PREMATURE ATRIAL CONTRACTION): ICD-10-CM

## 2021-04-20 PROCEDURE — 99214 OFFICE O/P EST MOD 30 MIN: CPT | Performed by: INTERNAL MEDICINE

## 2021-04-20 RX ORDER — ROSUVASTATIN CALCIUM 10 MG/1
10 TABLET, COATED ORAL DAILY
Qty: 90 TABLET | Refills: 3 | Status: SHIPPED | OUTPATIENT
Start: 2021-04-20 | End: 2022-05-16 | Stop reason: SDUPTHER

## 2021-04-20 NOTE — PROGRESS NOTES
Subjective   Carmelo Arnold is a 65 y.o. male.     Chief Complaint   Patient presents with       • Hypothyroidism   • Hypertension   • Hyperlipidemia       History of Present Illness   Patient here for follow-up.  Allergy stable on medication.  Hyperlipidemia stable on medication.  Vitamin D deficiency stable on supplement.  BPH stable patient is seeing urologist for PSA elevation.  Knee joint pain stable now.  Arrhythmia stable also.  Blood pressure borderline.  Sugar slightly elevated.    Current Outpatient Medications:   •  levothyroxine (SYNTHROID, LEVOTHROID) 75 MCG tablet, Take 75 mcg by mouth Daily., Disp: , Rfl:   •  metoprolol succinate XL (TOPROL-XL) 25 MG 24 hr tablet, Take 25 mg by mouth Daily., Disp: , Rfl:   •  finasteride (PROSCAR) 5 MG tablet, Take 5 mg by mouth Daily., Disp: , Rfl:   •  rosuvastatin (CRESTOR) 10 MG tablet, Take 10 mg by mouth Daily., Disp: , Rfl:   •  vitamin B-12 (CYANOCOBALAMIN) 1000 MCG tablet, Take 1,000 mcg by mouth Daily., Disp: , Rfl:     The following portions of the patient's history were reviewed and updated as appropriate: allergies, current medications, past family history, past medical history, past social history, past surgical history and problem list.    Review of Systems   Constitutional: Negative.    Respiratory: Negative.    Cardiovascular: Negative.    Gastrointestinal: Negative.    Musculoskeletal: Negative.    Skin: Negative.    Neurological: Negative.    Psychiatric/Behavioral: Negative.        Objective   Physical Exam  Cardiovascular:      Rate and Rhythm: Normal rate and regular rhythm.      Heart sounds: Normal heart sounds.   Pulmonary:      Effort: Pulmonary effort is normal.      Breath sounds: Normal breath sounds.   Abdominal:      General: Bowel sounds are normal.   Musculoskeletal:      Cervical back: Neck supple.   Skin:     General: Skin is warm.   Neurological:      Mental Status: He is alert and oriented to person, place, and time.         All tests  have been reviewed.    Assessment/Plan   Diagnoses and all orders for this visit:    Allergy, subsequent encounter continue medication    Hyperlipidemia, cont med     Vitamin D deficiency continue supplement    Hypothyroidism, cont med    B12 deficiency continue supplement    Ventral hernia without obstruction or gangrene    Benign prostatic hyperplasia with lower urinary tract symptoms, symptom details unspecified PSA high follow uro    Chronic pain of right knee improvee    PAC (premature atrial contraction) stable and occa    BP borderline watch for now, low salt diet    Hyperglycemia good diet       6 mo wellness

## 2021-04-29 ENCOUNTER — HOSPITAL ENCOUNTER (OUTPATIENT)
Dept: MRI IMAGING | Facility: HOSPITAL | Age: 66
End: 2021-04-29

## 2021-05-11 ENCOUNTER — HOSPITAL ENCOUNTER (OUTPATIENT)
Dept: MRI IMAGING | Facility: HOSPITAL | Age: 66
End: 2021-05-11

## 2021-06-08 RX ORDER — METOPROLOL SUCCINATE 25 MG/1
25 TABLET, EXTENDED RELEASE ORAL DAILY
Qty: 90 TABLET | Refills: 3 | Status: SHIPPED | OUTPATIENT
Start: 2021-06-08 | End: 2022-06-15 | Stop reason: SDUPTHER

## 2021-06-14 ENCOUNTER — HOSPITAL ENCOUNTER (OUTPATIENT)
Dept: MRI IMAGING | Facility: HOSPITAL | Age: 66
Discharge: HOME | End: 2021-06-14
Admitting: UROLOGY

## 2021-06-14 DIAGNOSIS — R97.20 ELEVATED PSA: ICD-10-CM

## 2021-06-14 PROCEDURE — 72197 MRI PELVIS W/O & W/DYE: CPT

## 2021-06-14 PROCEDURE — A9577 INJ MULTIHANCE: HCPCS | Performed by: UROLOGY

## 2021-06-14 PROCEDURE — 0 GADOBENATE DIMEGLUMINE 529 MG/ML SOLUTION: Performed by: UROLOGY

## 2021-06-14 RX ADMIN — GADOBENATE DIMEGLUMINE 19 ML: 529 INJECTION, SOLUTION INTRAVENOUS at 09:29

## 2021-06-18 ENCOUNTER — OFFICE VISIT (OUTPATIENT)
Dept: UROLOGY | Facility: CLINIC | Age: 66
End: 2021-06-18

## 2021-06-18 DIAGNOSIS — R97.20 ELEVATED PSA: Primary | ICD-10-CM

## 2021-06-18 PROCEDURE — 99442 PR PHYS/QHP TELEPHONE EVALUATION 11-20 MIN: CPT | Performed by: UROLOGY

## 2021-06-18 NOTE — PROGRESS NOTES
I had a 13-minute telephone conversation with the patient about his MRI results.    MRI Prostate With & Without Contrast  Result Date: 6/15/2021  PI-RADS 2 low probability of a clinically significant prostate carcinoma.    This report was finalized on 6/15/2021 8:18 AM by Sharla Nice M.D..    Since he did not have any significant lesions on MRI, we elected to continue to follow his PSA with a total, free, and percent free PSA in 1 year.  We discussed the risk, benefits, and alternatives to this approach.

## 2021-07-19 ENCOUNTER — PATIENT MESSAGE (OUTPATIENT)
Dept: INTERNAL MEDICINE | Facility: CLINIC | Age: 66
End: 2021-07-19

## 2021-07-23 LAB
ALBUMIN SERPL-MCNC: 4.1 G/DL (ref 3.5–5.2)
ALBUMIN/GLOB SERPL: 1.6 G/DL
ALP SERPL-CCNC: 64 U/L (ref 39–117)
ALT SERPL-CCNC: 21 U/L (ref 1–41)
AST SERPL-CCNC: 18 U/L (ref 1–40)
BASOPHILS # BLD AUTO: 0.06 10*3/MM3 (ref 0–0.2)
BASOPHILS NFR BLD AUTO: 0.8 % (ref 0–1.5)
BILIRUB SERPL-MCNC: 0.4 MG/DL (ref 0–1.2)
BUN SERPL-MCNC: 16 MG/DL (ref 8–23)
BUN/CREAT SERPL: 16 (ref 7–25)
CALCIUM SERPL-MCNC: 9.3 MG/DL (ref 8.6–10.5)
CHLORIDE SERPL-SCNC: 105 MMOL/L (ref 98–107)
CHOLEST SERPL-MCNC: 184 MG/DL (ref 0–200)
CK SERPL-CCNC: 137 U/L (ref 20–200)
CO2 SERPL-SCNC: 26.1 MMOL/L (ref 22–29)
CREAT SERPL-MCNC: 1 MG/DL (ref 0.76–1.27)
EOSINOPHIL # BLD AUTO: 0.37 10*3/MM3 (ref 0–0.4)
EOSINOPHIL NFR BLD AUTO: 4.6 % (ref 0.3–6.2)
ERYTHROCYTE [DISTWIDTH] IN BLOOD BY AUTOMATED COUNT: 13.4 % (ref 12.3–15.4)
GLOBULIN SER CALC-MCNC: 2.6 GM/DL
GLUCOSE SERPL-MCNC: 91 MG/DL (ref 65–99)
HBA1C MFR BLD: 5.3 % (ref 4.8–5.6)
HCT VFR BLD AUTO: 41.7 % (ref 37.5–51)
HDLC SERPL-MCNC: 39 MG/DL (ref 40–60)
HGB BLD-MCNC: 14.1 G/DL (ref 13–17.7)
IMM GRANULOCYTES # BLD AUTO: 0.06 10*3/MM3 (ref 0–0.05)
IMM GRANULOCYTES NFR BLD AUTO: 0.8 % (ref 0–0.5)
LDLC SERPL CALC-MCNC: 115 MG/DL (ref 0–100)
LYMPHOCYTES # BLD AUTO: 3.02 10*3/MM3 (ref 0.7–3.1)
LYMPHOCYTES NFR BLD AUTO: 37.8 % (ref 19.6–45.3)
MCH RBC QN AUTO: 29.1 PG (ref 26.6–33)
MCHC RBC AUTO-ENTMCNC: 33.8 G/DL (ref 31.5–35.7)
MCV RBC AUTO: 86.2 FL (ref 79–97)
MONOCYTES # BLD AUTO: 0.72 10*3/MM3 (ref 0.1–0.9)
MONOCYTES NFR BLD AUTO: 9 % (ref 5–12)
NEUTROPHILS # BLD AUTO: 3.76 10*3/MM3 (ref 1.7–7)
NEUTROPHILS NFR BLD AUTO: 47 % (ref 42.7–76)
NRBC BLD AUTO-RTO: 0 /100 WBC (ref 0–0.2)
PLATELET # BLD AUTO: 247 10*3/MM3 (ref 140–450)
POTASSIUM SERPL-SCNC: 4.5 MMOL/L (ref 3.5–5.2)
PROT SERPL-MCNC: 6.7 G/DL (ref 6–8.5)
RBC # BLD AUTO: 4.84 10*6/MM3 (ref 4.14–5.8)
SODIUM SERPL-SCNC: 143 MMOL/L (ref 136–145)
TRIGL SERPL-MCNC: 170 MG/DL (ref 0–150)
TSH SERPL DL<=0.005 MIU/L-ACNC: 5.92 UIU/ML (ref 0.27–4.2)
VIT B12 SERPL-MCNC: 871 PG/ML (ref 211–946)
VLDLC SERPL CALC-MCNC: 30 MG/DL (ref 5–40)
WBC # BLD AUTO: 7.99 10*3/MM3 (ref 3.4–10.8)

## 2021-07-28 ENCOUNTER — TELEPHONE (OUTPATIENT)
Dept: INTERNAL MEDICINE | Facility: CLINIC | Age: 66
End: 2021-07-28

## 2021-07-28 DIAGNOSIS — E03.9 HYPOTHYROIDISM, UNSPECIFIED TYPE: Primary | ICD-10-CM

## 2021-07-28 RX ORDER — LEVOTHYROXINE SODIUM 88 UG/1
TABLET ORAL
Qty: 30 TABLET | Refills: 1 | Status: SHIPPED | OUTPATIENT
Start: 2021-07-28 | End: 2021-09-29 | Stop reason: SDUPTHER

## 2021-07-28 NOTE — TELEPHONE ENCOUNTER
Attempted to contact patient; left detailed message per release notifying of dose change and provider's instructions to repeat labs in 6 weeks. [employer] message also sent to patient

## 2021-07-28 NOTE — TELEPHONE ENCOUNTER
Patient is requesting refill of Levothyroxine but you needed him to complete labs before refill. Patient had labs completed 07/23/2021 with abnormal results. Please advise and adjust medication dose if appropriate and I will contact patient.     TSH:: 5.920

## 2021-09-16 LAB — TSH SERPL DL<=0.005 MIU/L-ACNC: 4.41 UIU/ML (ref 0.27–4.2)

## 2021-09-29 DIAGNOSIS — E03.9 HYPOTHYROIDISM, UNSPECIFIED TYPE: ICD-10-CM

## 2021-09-29 RX ORDER — LEVOTHYROXINE SODIUM 88 UG/1
TABLET ORAL
Qty: 30 TABLET | Refills: 1 | Status: SHIPPED | OUTPATIENT
Start: 2021-09-29 | End: 2021-11-26 | Stop reason: SDUPTHER

## 2021-09-29 NOTE — TELEPHONE ENCOUNTER
Rx Refill Note  Requested Prescriptions     Pending Prescriptions Disp Refills   • levothyroxine (SYNTHROID, LEVOTHROID) 88 MCG tablet 30 tablet 1     Si daily po      Last office visit with prescribing clinician: 2021      Next office visit with prescribing clinician: 10/20/2021            Noni Hwang MA  21, 10:15 EDT

## 2021-11-16 ENCOUNTER — OFFICE VISIT (OUTPATIENT)
Dept: INTERNAL MEDICINE | Facility: CLINIC | Age: 66
End: 2021-11-16

## 2021-11-16 VITALS
SYSTOLIC BLOOD PRESSURE: 132 MMHG | DIASTOLIC BLOOD PRESSURE: 82 MMHG | OXYGEN SATURATION: 98 % | HEART RATE: 60 BPM | HEIGHT: 74 IN | WEIGHT: 216 LBS | BODY MASS INDEX: 27.72 KG/M2 | TEMPERATURE: 97.3 F

## 2021-11-16 DIAGNOSIS — G89.29 CHRONIC PAIN OF RIGHT KNEE: ICD-10-CM

## 2021-11-16 DIAGNOSIS — E78.5 HYPERLIPIDEMIA, UNSPECIFIED HYPERLIPIDEMIA TYPE: ICD-10-CM

## 2021-11-16 DIAGNOSIS — E03.9 HYPOTHYROIDISM, UNSPECIFIED TYPE: ICD-10-CM

## 2021-11-16 DIAGNOSIS — I49.1 PAC (PREMATURE ATRIAL CONTRACTION): ICD-10-CM

## 2021-11-16 DIAGNOSIS — E55.9 VITAMIN D DEFICIENCY, UNSPECIFIED: ICD-10-CM

## 2021-11-16 DIAGNOSIS — E55.9 VITAMIN D DEFICIENCY: ICD-10-CM

## 2021-11-16 DIAGNOSIS — E53.8 B12 DEFICIENCY: ICD-10-CM

## 2021-11-16 DIAGNOSIS — Z00.00 WELLNESS EXAMINATION: ICD-10-CM

## 2021-11-16 DIAGNOSIS — N40.1 BENIGN PROSTATIC HYPERPLASIA WITH LOWER URINARY TRACT SYMPTOMS, SYMPTOM DETAILS UNSPECIFIED: ICD-10-CM

## 2021-11-16 DIAGNOSIS — K43.9 VENTRAL HERNIA WITHOUT OBSTRUCTION OR GANGRENE: ICD-10-CM

## 2021-11-16 DIAGNOSIS — R73.9 HYPERGLYCEMIA: ICD-10-CM

## 2021-11-16 DIAGNOSIS — K63.5 BENIGN COLON POLYP: ICD-10-CM

## 2021-11-16 DIAGNOSIS — M25.561 CHRONIC PAIN OF RIGHT KNEE: ICD-10-CM

## 2021-11-16 DIAGNOSIS — T78.40XD ALLERGY, SUBSEQUENT ENCOUNTER: Primary | ICD-10-CM

## 2021-11-16 PROCEDURE — 1170F FXNL STATUS ASSESSED: CPT | Performed by: INTERNAL MEDICINE

## 2021-11-16 PROCEDURE — G0439 PPPS, SUBSEQ VISIT: HCPCS | Performed by: INTERNAL MEDICINE

## 2021-11-16 PROCEDURE — 1159F MED LIST DOCD IN RCRD: CPT | Performed by: INTERNAL MEDICINE

## 2021-11-16 NOTE — PROGRESS NOTES
The ABCs of the Annual Wellness Visit  Subsequent Medicare Wellness Visit    Chief Complaint   Patient presents with   • Medicare Wellness-subsequent      Subjective    History of Present Illness:  Carmelo Arnold is a 66 y.o. male who presents for a Subsequent Medicare Wellness Visit.    The following portions of the patient's history were reviewed and   updated as appropriate: allergies, current medications, past family history, past medical history, past social history, past surgical history and problem list.    Compared to one year ago, the patient feels his physical   health is the same.    Compared to one year ago, the patient feels his mental   health is the same.    Recent Hospitalizations:  He was not admitted to the hospital during the last year.       Current Medical Providers:  Patient Care Team:  Salas Meléndez MD as PCP - General (Internal Medicine)  Negrita Herrera MD as Consulting Physician (General Surgery)  Negrita Herrera MD as Consulting Physician (General Surgery)    Outpatient Medications Prior to Visit   Medication Sig Dispense Refill   • levothyroxine (SYNTHROID, LEVOTHROID) 88 MCG tablet 1 daily po 30 tablet 1   • metoprolol succinate XL (TOPROL-XL) 25 MG 24 hr tablet Take 1 tablet by mouth Daily. 90 tablet 3   • rosuvastatin (CRESTOR) 10 MG tablet Take 1 tablet by mouth Daily. 90 tablet 3   • vitamin B-12 (CYANOCOBALAMIN) 1000 MCG tablet Take 1,000 mcg by mouth Daily.     • finasteride (PROSCAR) 5 MG tablet Take 5 mg by mouth Daily.       No facility-administered medications prior to visit.       No opioid medication identified on active medication list. I have reviewed chart for other potential  high risk medication/s and harmful drug interactions in the elderly.          Aspirin is not on active medication list.  Aspirin use is not indicated based on review of current medical condition/s. Risk of harm outweighs potential benefits.  .    Patient Active Problem List   Diagnosis   • Vitamin D  "deficiency   • Benign prostatic hyperplasia with lower urinary tract symptoms   • Hypothyroidism   • Allergy   • Hyperlipidemia   • B12 deficiency   • PAC (premature atrial contraction)   • Ventral hernia   • Benign colon polyp   • Chronic pain of right knee   • Hyperglycemia     Advance Care Planning  Advance Directive is not on file.  ACP discussion was held with the patient during this visit. Patient does not have an advance directive, information provided.    Review of Systems   All other systems reviewed and are negative.       Objective    Vitals:    11/16/21 1634   BP: 132/82   Pulse: 60   Temp: 97.3 °F (36.3 °C)   SpO2: 98%   Weight: 98 kg (216 lb)   Height: 188 cm (74\")     BMI Readings from Last 1 Encounters:   11/16/21 27.73 kg/m²   BMI is above normal parameters. Recommendations include: exercise counseling    Does the patient have evidence of cognitive impairment? No    Physical Exam  Constitutional:       Appearance: He is well-developed.   Cardiovascular:      Rate and Rhythm: Normal rate and regular rhythm.      Heart sounds: Normal heart sounds.   Pulmonary:      Effort: Pulmonary effort is normal.      Breath sounds: Normal breath sounds.   Abdominal:      General: Bowel sounds are normal.      Palpations: Abdomen is soft.   Musculoskeletal:      Cervical back: Neck supple.   Neurological:      Mental Status: He is alert and oriented to person, place, and time.   Psychiatric:         Behavior: Behavior normal.                 HEALTH RISK ASSESSMENT    Smoking Status:  Social History     Tobacco Use   Smoking Status Never Smoker   Smokeless Tobacco Never Used     Alcohol Consumption:  Social History     Substance and Sexual Activity   Alcohol Use Yes   • Alcohol/week: 0.0 standard drinks    Comment: Rarely      Fall Risk Screen:    STEADI Fall Risk Assessment was completed, and patient is at LOW risk for falls.Assessment completed on:3/15/2021    Depression Screening:  PHQ-2/PHQ-9 Depression " Screening 11/16/2021   Little interest or pleasure in doing things 0   Feeling down, depressed, or hopeless 0   Total Score 0       Health Habits and Functional and Cognitive Screening:  Functional & Cognitive Status 11/16/2021   Do you have difficulty preparing food and eating? No   Do you have difficulty bathing yourself, getting dressed or grooming yourself? No   Do you have difficulty using the toilet? No   Do you have difficulty moving around from place to place? No   Do you have trouble with steps or getting out of a bed or a chair? No   Current Diet Well Balanced Diet   Dental Exam Up to date        Dental Exam Comment 09/2021   Eye Exam Not up to date        Eye Exam Comment 2019   Exercise (times per week) 0 times per week   Current Exercises Include No Regular Exercise   Current Exercise Activities Include -   Do you need help using the phone?  No   Are you deaf or do you have serious difficulty hearing?  No   Do you need help with transportation? No   Do you need help shopping? No   Do you need help preparing meals?  No   Do you need help with housework?  No   Do you need help with laundry? No   Do you need help taking your medications? No   Do you need help managing money? No   Do you ever drive or ride in a car without wearing a seat belt? No   Have you felt unusual stress, anger or loneliness in the last month? No   Who do you live with? Spouse   If you need help, do you have trouble finding someone available to you? No   Have you been bothered in the last four weeks by sexual problems? No   Do you have difficulty concentrating, remembering or making decisions? No       Age-appropriate Screening Schedule:  Refer to the list below for future screening recommendations based on patient's age, sex and/or medical conditions. Orders for these recommended tests are listed in the plan section. The patient has been provided with a written plan.    Health Maintenance   Topic Date Due   • ZOSTER VACCINE (1 of 2)  Never done   • INFLUENZA VACCINE  Never done   • LIPID PANEL  07/23/2022   • TDAP/TD VACCINES (2 - Td or Tdap) 05/19/2026              Assessment/Plan   CMS Preventative Services Quick Reference  Risk Factors Identified During Encounter  Inactivity/Sedentary  The above risks/problems have been discussed with the patient.  Follow up actions/plans if indicated are seen below in the Assessment/Plan Section.  Pertinent information has been shared with the patient in the After Visit Summary.    Diagnoses and all orders for this visit:    1. Allergy, subsequent encounter (Primary)    2. PAC (premature atrial contraction)    3. Hyperlipidemia, unspecified hyperlipidemia type    4. Vitamin D deficiency    5. Hypothyroidism, unspecified type    6. B12 deficiency    7. Ventral hernia without obstruction or gangrene    8. Benign colon polyp    9. Benign prostatic hyperplasia with lower urinary tract symptoms, symptom details unspecified    10. Chronic pain of right knee    11. Hyperglycemia    12. Wellness examination  -     CBC & Differential  -     Comprehensive Metabolic Panel  -     Lipid Panel  -     TSH  -     Vitamin B12  -     Vitamin D 25 Hydroxy  -     CK  -     Hemoglobin A1c  -     Hepatitis C Antibody    13. Vitamin D deficiency, unspecified   -     Vitamin D 25 Hydroxy        Follow Up:   No follow-ups on file.     An After Visit Summary and PPPS were made available to the patient.

## 2021-11-20 LAB
25(OH)D3+25(OH)D2 SERPL-MCNC: 41.9 NG/ML
ALBUMIN SERPL-MCNC: 4.5 G/DL (ref 3.5–5.2)
ALBUMIN/GLOB SERPL: 1.9 G/DL
ALP SERPL-CCNC: 59 U/L (ref 39–117)
ALT SERPL-CCNC: 21 U/L (ref 1–41)
AST SERPL-CCNC: 12 U/L (ref 1–40)
BASOPHILS # BLD AUTO: 0.07 10*3/MM3 (ref 0–0.2)
BASOPHILS NFR BLD AUTO: 0.9 % (ref 0–1.5)
BILIRUB SERPL-MCNC: 0.4 MG/DL (ref 0–1.2)
BUN SERPL-MCNC: 16 MG/DL (ref 8–23)
BUN/CREAT SERPL: 13.7 (ref 7–25)
CALCIUM SERPL-MCNC: 9.4 MG/DL (ref 8.6–10.5)
CHLORIDE SERPL-SCNC: 105 MMOL/L (ref 98–107)
CHOLEST SERPL-MCNC: 194 MG/DL (ref 0–200)
CK SERPL-CCNC: 89 U/L (ref 20–200)
CO2 SERPL-SCNC: 26.2 MMOL/L (ref 22–29)
CREAT SERPL-MCNC: 1.17 MG/DL (ref 0.76–1.27)
EOSINOPHIL # BLD AUTO: 0.27 10*3/MM3 (ref 0–0.4)
EOSINOPHIL NFR BLD AUTO: 3.6 % (ref 0.3–6.2)
ERYTHROCYTE [DISTWIDTH] IN BLOOD BY AUTOMATED COUNT: 13.7 % (ref 12.3–15.4)
GLOBULIN SER CALC-MCNC: 2.4 GM/DL
GLUCOSE SERPL-MCNC: 93 MG/DL (ref 65–99)
HBA1C MFR BLD: 5.4 % (ref 4.8–5.6)
HCT VFR BLD AUTO: 46 % (ref 37.5–51)
HCV AB S/CO SERPL IA: <0.1 S/CO RATIO (ref 0–0.9)
HDLC SERPL-MCNC: 51 MG/DL (ref 40–60)
HGB BLD-MCNC: 15.1 G/DL (ref 13–17.7)
IMM GRANULOCYTES # BLD AUTO: 0.03 10*3/MM3 (ref 0–0.05)
IMM GRANULOCYTES NFR BLD AUTO: 0.4 % (ref 0–0.5)
LDLC SERPL CALC-MCNC: 112 MG/DL (ref 0–100)
LYMPHOCYTES # BLD AUTO: 1.63 10*3/MM3 (ref 0.7–3.1)
LYMPHOCYTES NFR BLD AUTO: 21.9 % (ref 19.6–45.3)
MCH RBC QN AUTO: 28.9 PG (ref 26.6–33)
MCHC RBC AUTO-ENTMCNC: 32.8 G/DL (ref 31.5–35.7)
MCV RBC AUTO: 88.1 FL (ref 79–97)
MONOCYTES # BLD AUTO: 0.65 10*3/MM3 (ref 0.1–0.9)
MONOCYTES NFR BLD AUTO: 8.7 % (ref 5–12)
NEUTROPHILS # BLD AUTO: 4.8 10*3/MM3 (ref 1.7–7)
NEUTROPHILS NFR BLD AUTO: 64.5 % (ref 42.7–76)
NRBC BLD AUTO-RTO: 0 /100 WBC (ref 0–0.2)
PLATELET # BLD AUTO: 215 10*3/MM3 (ref 140–450)
POTASSIUM SERPL-SCNC: 4.7 MMOL/L (ref 3.5–5.2)
PROT SERPL-MCNC: 6.9 G/DL (ref 6–8.5)
RBC # BLD AUTO: 5.22 10*6/MM3 (ref 4.14–5.8)
SODIUM SERPL-SCNC: 142 MMOL/L (ref 136–145)
TRIGL SERPL-MCNC: 179 MG/DL (ref 0–150)
TSH SERPL DL<=0.005 MIU/L-ACNC: 2.65 UIU/ML (ref 0.27–4.2)
VIT B12 SERPL-MCNC: 872 PG/ML (ref 211–946)
VLDLC SERPL CALC-MCNC: 31 MG/DL (ref 5–40)
WBC # BLD AUTO: 7.45 10*3/MM3 (ref 3.4–10.8)

## 2021-11-26 DIAGNOSIS — E03.9 HYPOTHYROIDISM, UNSPECIFIED TYPE: ICD-10-CM

## 2021-11-29 RX ORDER — LEVOTHYROXINE SODIUM 88 UG/1
TABLET ORAL
Qty: 90 TABLET | Refills: 3 | Status: SHIPPED | OUTPATIENT
Start: 2021-11-29 | End: 2022-06-15 | Stop reason: SDUPTHER

## 2021-11-29 NOTE — TELEPHONE ENCOUNTER
Rx Refill Note  Requested Prescriptions     Pending Prescriptions Disp Refills   • levothyroxine (SYNTHROID, LEVOTHROID) 88 MCG tablet 30 tablet 1     Si daily po      Last office visit with prescribing clinician: 2021      Next office visit with prescribing clinician: 2022            Noni Hwang MA  21, 10:47 EST     Last labs 2021   TSH 2.650

## 2022-05-16 ENCOUNTER — OFFICE VISIT (OUTPATIENT)
Dept: INTERNAL MEDICINE | Facility: CLINIC | Age: 67
End: 2022-05-16

## 2022-05-16 VITALS
WEIGHT: 215 LBS | DIASTOLIC BLOOD PRESSURE: 80 MMHG | SYSTOLIC BLOOD PRESSURE: 130 MMHG | TEMPERATURE: 97.3 F | BODY MASS INDEX: 27.59 KG/M2 | HEART RATE: 75 BPM | OXYGEN SATURATION: 99 % | HEIGHT: 74 IN

## 2022-05-16 DIAGNOSIS — R97.20 ELEVATED PSA: Primary | ICD-10-CM

## 2022-05-16 DIAGNOSIS — I49.1 PAC (PREMATURE ATRIAL CONTRACTION): ICD-10-CM

## 2022-05-16 DIAGNOSIS — E53.8 B12 DEFICIENCY: ICD-10-CM

## 2022-05-16 DIAGNOSIS — E03.9 HYPOTHYROIDISM, UNSPECIFIED TYPE: Primary | ICD-10-CM

## 2022-05-16 DIAGNOSIS — F32.A DEPRESSION, UNSPECIFIED DEPRESSION TYPE: ICD-10-CM

## 2022-05-16 DIAGNOSIS — E78.5 HYPERLIPIDEMIA, UNSPECIFIED HYPERLIPIDEMIA TYPE: ICD-10-CM

## 2022-05-16 LAB
ALBUMIN SERPL-MCNC: 4.1 G/DL (ref 3.5–5.2)
ALBUMIN/GLOB SERPL: 1.4 G/DL
ALP SERPL-CCNC: 59 U/L (ref 39–117)
ALT SERPL-CCNC: 17 U/L (ref 1–41)
AST SERPL-CCNC: 16 U/L (ref 1–40)
BILIRUB SERPL-MCNC: 0.5 MG/DL (ref 0–1.2)
BUN SERPL-MCNC: 13 MG/DL (ref 8–23)
BUN/CREAT SERPL: 12 (ref 7–25)
CALCIUM SERPL-MCNC: 9.3 MG/DL (ref 8.6–10.5)
CHLORIDE SERPL-SCNC: 107 MMOL/L (ref 98–107)
CHOLEST SERPL-MCNC: 180 MG/DL (ref 0–200)
CK SERPL-CCNC: 209 U/L (ref 20–200)
CO2 SERPL-SCNC: 21.6 MMOL/L (ref 22–29)
CREAT SERPL-MCNC: 1.08 MG/DL (ref 0.76–1.27)
EGFRCR SERPLBLD CKD-EPI 2021: 74.7 ML/MIN/1.73
GLOBULIN SER CALC-MCNC: 3 GM/DL
GLUCOSE SERPL-MCNC: 99 MG/DL (ref 65–99)
HDLC SERPL-MCNC: 50 MG/DL (ref 40–60)
LDLC SERPL CALC-MCNC: 108 MG/DL (ref 0–100)
POTASSIUM SERPL-SCNC: 4.3 MMOL/L (ref 3.5–5.2)
PROT SERPL-MCNC: 7.1 G/DL (ref 6–8.5)
PSA SERPL-MCNC: 7.63 NG/ML (ref 0–4)
SODIUM SERPL-SCNC: 139 MMOL/L (ref 136–145)
TRIGL SERPL-MCNC: 125 MG/DL (ref 0–150)
TSH SERPL DL<=0.005 MIU/L-ACNC: 2.37 UIU/ML (ref 0.27–4.2)
VLDLC SERPL CALC-MCNC: 22 MG/DL (ref 5–40)

## 2022-05-16 PROCEDURE — 99214 OFFICE O/P EST MOD 30 MIN: CPT | Performed by: INTERNAL MEDICINE

## 2022-05-16 RX ORDER — ROSUVASTATIN CALCIUM 10 MG/1
10 TABLET, COATED ORAL DAILY
Qty: 90 TABLET | Refills: 3 | Status: SHIPPED | OUTPATIENT
Start: 2022-05-16

## 2022-05-16 NOTE — PROGRESS NOTES
Subjective   Carmelo Arnold is a 66 y.o. male.     Chief Complaint   Patient presents with   • Follow-up   • Hypertension   • Hyperlipidemia   • Hypothyroidism       History of Present Illness   patient here for follow-up. Blood pressure stable medication hyperlipidemia stable medication hypothyroidism stable medication. Patient complains depressed from time to time usually mild denies any anxiety no suicidal attempt.    Current Outpatient Medications:   •  levothyroxine (SYNTHROID, LEVOTHROID) 88 MCG tablet, 1 daily po, Disp: 90 tablet, Rfl: 3  •  metoprolol succinate XL (TOPROL-XL) 25 MG 24 hr tablet, Take 1 tablet by mouth Daily., Disp: 90 tablet, Rfl: 3  •  rosuvastatin (CRESTOR) 10 MG tablet, Take 1 tablet by mouth Daily., Disp: 90 tablet, Rfl: 3  •  vitamin B-12 (CYANOCOBALAMIN) 1000 MCG tablet, Take 1,000 mcg by mouth Daily., Disp: , Rfl:     The following portions of the patient's history were reviewed and updated as appropriate: allergies, current medications, past family history, past medical history, past social history, past surgical history and problem list.    Review of Systems   Constitutional: Negative.    Respiratory: Negative.    Cardiovascular: Negative.    Gastrointestinal: Negative.    Musculoskeletal: Negative.    Skin: Negative.    Neurological: Negative.    Psychiatric/Behavioral:        Depressed mood the sometime       Objective   Physical Exam  Cardiovascular:      Rate and Rhythm: Normal rate and regular rhythm.      Heart sounds: Normal heart sounds.   Pulmonary:      Effort: Pulmonary effort is normal.      Breath sounds: Normal breath sounds.   Abdominal:      General: Bowel sounds are normal.   Musculoskeletal:      Cervical back: Neck supple.   Skin:     General: Skin is warm.   Neurological:      Mental Status: He is alert and oriented to person, place, and time.   Psychiatric:      Comments: Depressed mood some time          All tests have been reviewed.    Assessment & Plan   Diagnoses  and all orders for this visit:    Hypothyroidism, unspecified type  Cont med  -     TSH    Hyperlipidemia, unspecified hyperlipidemia type cont med and needs lab monitor   -     rosuvastatin (CRESTOR) 10 MG tablet; Take 1 tablet by mouth Daily.  -     CK  -     Comprehensive Metabolic Panel  -     Lipid Panel    PAC (premature atrial contraction) cont med     B12 deficiency stable     Depressed mood PQH9 =5 watch for now counseling today     6 mo wellness

## 2022-06-15 DIAGNOSIS — E03.9 HYPOTHYROIDISM, UNSPECIFIED TYPE: ICD-10-CM

## 2022-06-16 RX ORDER — METOPROLOL SUCCINATE 25 MG/1
25 TABLET, EXTENDED RELEASE ORAL DAILY
Qty: 90 TABLET | Refills: 3 | Status: SHIPPED | OUTPATIENT
Start: 2022-06-16 | End: 2023-03-14

## 2022-06-16 RX ORDER — LEVOTHYROXINE SODIUM 88 UG/1
TABLET ORAL
Qty: 90 TABLET | Refills: 3 | Status: SHIPPED | OUTPATIENT
Start: 2022-06-16 | End: 2023-03-14

## 2022-06-16 NOTE — TELEPHONE ENCOUNTER
Rx Refill Note  Requested Prescriptions     Pending Prescriptions Disp Refills   • metoprolol succinate XL (TOPROL-XL) 25 MG 24 hr tablet 90 tablet 3     Sig: Take 1 tablet by mouth Daily.   • levothyroxine (SYNTHROID, LEVOTHROID) 88 MCG tablet 90 tablet 3     Si daily po      Last office visit with prescribing clinician: 2022      Next office visit with prescribing clinician: 2022            Noni Hwang MA  22, 08:09 EDT     Last labs 2022   TSH: 2.370

## 2022-06-20 ENCOUNTER — OFFICE VISIT (OUTPATIENT)
Dept: UROLOGY | Facility: CLINIC | Age: 67
End: 2022-06-20

## 2022-06-20 VITALS
WEIGHT: 214 LBS | HEIGHT: 74 IN | SYSTOLIC BLOOD PRESSURE: 148 MMHG | TEMPERATURE: 97.1 F | DIASTOLIC BLOOD PRESSURE: 83 MMHG | HEART RATE: 78 BPM | BODY MASS INDEX: 27.46 KG/M2 | OXYGEN SATURATION: 99 %

## 2022-06-20 DIAGNOSIS — R97.20 ELEVATED PSA: Primary | ICD-10-CM

## 2022-06-20 LAB
BILIRUB BLD-MCNC: NEGATIVE MG/DL
CLARITY, POC: CLEAR
COLOR UR: YELLOW
EXPIRATION DATE: NORMAL
GLUCOSE UR STRIP-MCNC: NEGATIVE MG/DL
KETONES UR QL: NEGATIVE
LEUKOCYTE EST, POC: NEGATIVE
Lab: NORMAL
NITRITE UR-MCNC: NEGATIVE MG/ML
PH UR: 6 [PH] (ref 5–8)
PROT UR STRIP-MCNC: NEGATIVE MG/DL
RBC # UR STRIP: NEGATIVE /UL
SP GR UR: 1.01 (ref 1–1.03)
UROBILINOGEN UR QL: NORMAL

## 2022-06-20 PROCEDURE — 81003 URINALYSIS AUTO W/O SCOPE: CPT | Performed by: PHYSICIAN ASSISTANT

## 2022-06-20 PROCEDURE — 99213 OFFICE O/P EST LOW 20 MIN: CPT | Performed by: PHYSICIAN ASSISTANT

## 2022-06-20 NOTE — PROGRESS NOTES
"Chief Complaint   Patient presents with   • Follow-up        HPI  Mr. Arnold is a 66 y.o. male with history of LUTS and elevated PSA who presents for follow up.     At this visit, has had rare suprapubic pains. Denies any hematuria, dysuria, or weak stream. Admits to long-standing hesitancy.  He recently started taking vitamin B complex and notes his urine has become bright yellow.    Previous history from Dr. Pandya: \"history of PAT and urinary symptoms on finasteride for approximately 1 year who presents with urinary frequency. He denies burning or blood when urinating. He denies family history of prostate cancer. He reports that his \"PSA was high,\" when checked by his previous physician in [place]. He adds that a prostate biopsy was performed 8 years ago, and nothing was found. He reports that he had an ultrasound of his prostate 2 years ago. He also had another biopsy 15 years ago, and reports that nothing was found.\"    Past Medical History:   Diagnosis Date   • Acid reflux    • Benign colon polyp 1/12/2021   • Colon polyp    • Colon polyp    • Erectile dysfunction    • Heart murmur        Past Surgical History:   Procedure Laterality Date   • COLONOSCOPY           Current Outpatient Medications:   •  levothyroxine (SYNTHROID, LEVOTHROID) 88 MCG tablet, 1 daily po, Disp: 90 tablet, Rfl: 3  •  metoprolol succinate XL (TOPROL-XL) 25 MG 24 hr tablet, Take 1 tablet by mouth Daily., Disp: 90 tablet, Rfl: 3  •  rosuvastatin (CRESTOR) 10 MG tablet, Take 1 tablet by mouth Daily., Disp: 90 tablet, Rfl: 3  •  vitamin B-12 (CYANOCOBALAMIN) 1000 MCG tablet, Take 1,000 mcg by mouth Daily., Disp: , Rfl:      Physical Exam  Visit Vitals  /83   Pulse 78   Temp 97.1 °F (36.2 °C)   Ht 188 cm (74\")   Wt 97.1 kg (214 lb)   SpO2 99%   BMI 27.48 kg/m²       Labs  Brief Urine Lab Results  (Last result in the past 365 days)      Color   Clarity   Blood   Leuk Est   Nitrite   Protein   CREAT   Urine HCG        06/20/22 0824 " Yellow   Clear   Negative   Negative   Negative   Negative                 Lab Results   Component Value Date    GLUCOSE 99 05/16/2022    CALCIUM 9.3 05/16/2022     05/16/2022    K 4.3 05/16/2022    CO2 21.6 (L) 05/16/2022     05/16/2022    BUN 13 05/16/2022    CREATININE 1.08 05/16/2022    EGFRIFAFRI 75 11/19/2021    EGFRIFNONA 62 11/19/2021    BCR 12.0 05/16/2022       Lab Results   Component Value Date    WBC 7.45 11/19/2021    HGB 15.1 11/19/2021    HCT 46.0 11/19/2021    MCV 88.1 11/19/2021     11/19/2021            Lab Results   Component Value Date    PSA 7.630 (H) 05/16/2022    PSA 3.1 03/11/2021       Assessment  66 y.o. male with history of elevated PSA, prostate biopsy x2 (most recently 9 years ago) presenting for follow-up.  His prostate MRI performed 1 year ago revealed a PI-RADS 2 lesion and he is undergoing surveillance with PSA.  PSA obtained 1 month prior to this visit by primary care unfortunately was more than 2 times higher than PSA last year.  We discussed the significance of an elevated PSA as a possible indication of underlying prostate cancer.  We discussed that the gold standard of diagnosis is a prostate biopsy.  We additionally discussed that PSA is occasionally labile and warrants recheck prior to his third biopsy.  He has previously denied any family history of prostate cancer.    Plan  1.  Obtain total to free ratio of PSA today  Follow-up is based on these results, I will call the patient when I see them in my inbox

## 2022-07-05 ENCOUNTER — LAB (OUTPATIENT)
Dept: UROLOGY | Facility: CLINIC | Age: 67
End: 2022-07-05

## 2022-07-18 ENCOUNTER — OFFICE VISIT (OUTPATIENT)
Dept: UROLOGY | Facility: CLINIC | Age: 67
End: 2022-07-18

## 2022-07-18 DIAGNOSIS — R97.20 ELEVATED PSA: Primary | ICD-10-CM

## 2022-07-18 PROCEDURE — 99441 PR PHYS/QHP TELEPHONE EVALUATION 5-10 MIN: CPT | Performed by: PHYSICIAN ASSISTANT

## 2022-07-18 NOTE — PROGRESS NOTES
Chief Complaint   Patient presents with   • Elevated PSA        HPI  Mr. Arnold is a 66 y.o. male with history of elevated PSA who consented to receive his follow-up care by phone.    At this visit, we are discussing his 4K score results and next steps.    Past Medical History:   Diagnosis Date   • Acid reflux    • Benign colon polyp 1/12/2021   • Colon polyp    • Colon polyp    • Erectile dysfunction    • Heart murmur        Past Surgical History:   Procedure Laterality Date   • COLONOSCOPY           Current Outpatient Medications:   •  levothyroxine (SYNTHROID, LEVOTHROID) 88 MCG tablet, 1 daily po, Disp: 90 tablet, Rfl: 3  •  metoprolol succinate XL (TOPROL-XL) 25 MG 24 hr tablet, Take 1 tablet by mouth Daily., Disp: 90 tablet, Rfl: 3  •  rosuvastatin (CRESTOR) 10 MG tablet, Take 1 tablet by mouth Daily., Disp: 90 tablet, Rfl: 3  •  vitamin B-12 (CYANOCOBALAMIN) 1000 MCG tablet, Take 1,000 mcg by mouth Daily., Disp: , Rfl:      Physical Exam  There were no vitals taken for this visit.    Labs  Brief Urine Lab Results  (Last result in the past 365 days)      Color   Clarity   Blood   Leuk Est   Nitrite   Protein   CREAT   Urine HCG        06/20/22 0824 Yellow   Clear   Negative   Negative   Negative   Negative                 Lab Results   Component Value Date    GLUCOSE 99 05/16/2022    CALCIUM 9.3 05/16/2022     05/16/2022    K 4.3 05/16/2022    CO2 21.6 (L) 05/16/2022     05/16/2022    BUN 13 05/16/2022    CREATININE 1.08 05/16/2022    EGFRIFAFRI 75 11/19/2021    EGFRIFNONA 62 11/19/2021    BCR 12.0 05/16/2022       Lab Results   Component Value Date    WBC 7.45 11/19/2021    HGB 15.1 11/19/2021    HCT 46.0 11/19/2021    MCV 88.1 11/19/2021     11/19/2021            Lab Results   Component Value Date    PSA 7.8 (H) 06/21/2022    PSA 7.630 (H) 05/16/2022    PSA 3.1 03/11/2021         Assessment  66 y.o. male with history of elevated PSA.  He has had 2 previous negative biopsies, last one at least  8 years ago.  He had a prostate MRI 12 months ago that was PI-RADS 2, findings of BPH.  Finasteride was stopped at that time. 4K score was obtained given that his PSA increased from 3 to 7 over that 14-month period.  Unfortunately, he is high risk based on 4K score for an aggressive prostate cancer.          Plan  1.  Repeat prostate MRI and FU with Dr. Pandya to discuss 3rd biopsy, based on MRI results    Total time 7 minutes

## 2022-08-11 ENCOUNTER — OFFICE VISIT (OUTPATIENT)
Dept: INTERNAL MEDICINE | Facility: CLINIC | Age: 67
End: 2022-08-11

## 2022-08-11 VITALS
OXYGEN SATURATION: 98 % | HEART RATE: 73 BPM | HEIGHT: 74 IN | BODY MASS INDEX: 28.23 KG/M2 | TEMPERATURE: 97.1 F | WEIGHT: 220 LBS

## 2022-08-11 DIAGNOSIS — I49.1 PAC (PREMATURE ATRIAL CONTRACTION): ICD-10-CM

## 2022-08-11 DIAGNOSIS — E03.9 HYPOTHYROIDISM, UNSPECIFIED TYPE: ICD-10-CM

## 2022-08-11 DIAGNOSIS — F32.A DEPRESSION, UNSPECIFIED DEPRESSION TYPE: Primary | ICD-10-CM

## 2022-08-11 DIAGNOSIS — E78.5 HYPERLIPIDEMIA, UNSPECIFIED HYPERLIPIDEMIA TYPE: ICD-10-CM

## 2022-08-11 DIAGNOSIS — M25.561 CHRONIC PAIN OF RIGHT KNEE: ICD-10-CM

## 2022-08-11 DIAGNOSIS — E53.8 B12 DEFICIENCY: ICD-10-CM

## 2022-08-11 DIAGNOSIS — G89.29 CHRONIC PAIN OF RIGHT KNEE: ICD-10-CM

## 2022-08-11 DIAGNOSIS — H60.91 OTITIS EXTERNA OF RIGHT EAR, UNSPECIFIED CHRONICITY, UNSPECIFIED TYPE: ICD-10-CM

## 2022-08-11 DIAGNOSIS — T78.40XD ALLERGY, SUBSEQUENT ENCOUNTER: ICD-10-CM

## 2022-08-11 DIAGNOSIS — R73.9 HYPERGLYCEMIA: ICD-10-CM

## 2022-08-11 DIAGNOSIS — N40.1 BENIGN PROSTATIC HYPERPLASIA WITH LOWER URINARY TRACT SYMPTOMS, SYMPTOM DETAILS UNSPECIFIED: ICD-10-CM

## 2022-08-11 DIAGNOSIS — E55.9 VITAMIN D DEFICIENCY: ICD-10-CM

## 2022-08-11 PROCEDURE — 99397 PER PM REEVAL EST PAT 65+ YR: CPT | Performed by: INTERNAL MEDICINE

## 2022-08-11 PROCEDURE — 99213 OFFICE O/P EST LOW 20 MIN: CPT | Performed by: INTERNAL MEDICINE

## 2022-08-11 RX ORDER — AMOXICILLIN AND CLAVULANATE POTASSIUM 875; 125 MG/1; MG/1
1 TABLET, FILM COATED ORAL 2 TIMES DAILY
Qty: 14 TABLET | Refills: 0 | Status: SHIPPED | OUTPATIENT
Start: 2022-08-11 | End: 2022-08-29

## 2022-08-11 NOTE — PROGRESS NOTES
Subjective   Carmelo Arnold is a 66 y.o. male and is here for a comprehensive physical exam. 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 Patient also complains of right ear pain for several days weight secretion came out this morning. History of depression improved without medication. Denies any suicidal thoughts no anxiety. Hypothyroidism stable medication PAC stable on medication hyperlipidemia stable on medication PSA elevated patient is seeing urology for biopsy pending    Do you take any herbs or supplements that were not prescribed by a doctor? no  Are you taking calcium supplements? no  Are you taking aspirin daily? no      The following portions of the patient's history were reviewed and updated as appropriate: allergies, current medications, past family history, past medical history, past social history, past surgical history and problem list.      Review of Systems   Constitutional: Negative.    HENT: Positive for ear pain.    Eyes: Negative.    Respiratory: Negative.    Cardiovascular: Negative.    Gastrointestinal: Negative.    Endocrine: Negative.    Genitourinary: Negative.    Musculoskeletal: Negative.    Skin: Negative.    Allergic/Immunologic: Negative.    Neurological: Negative.    Hematological: Negative.    Psychiatric/Behavioral: Negative.    All other systems reviewed and are negative.        Physical Exam  Vitals and nursing note reviewed.   Constitutional:       Appearance: Normal appearance. He is well-developed.   HENT:      Head: Normocephalic and atraumatic.      Right Ear: External ear normal.      Left Ear: External ear normal.      Ears:      Comments: Right side ear exudate erythema  hearing aids bilaterally     Nose: Nose normal.   Eyes:      Conjunctiva/sclera: Conjunctivae normal.      Pupils: Pupils are equal, round, and reactive to light.   Neck:      Thyroid: No thyromegaly.   Cardiovascular:      Rate and Rhythm: Normal rate and  regular rhythm.      Heart sounds: Normal heart sounds.   Pulmonary:      Effort: Pulmonary effort is normal.      Breath sounds: Normal breath sounds.   Abdominal:      General: Bowel sounds are normal.      Palpations: Abdomen is soft.   Genitourinary:     Penis: Normal.       Testes: Normal.      Prostate: Normal.      Comments: anus normal  Musculoskeletal:         General: Normal range of motion.      Cervical back: Normal range of motion and neck supple.   Skin:     General: Skin is warm and dry.   Neurological:      Mental Status: He is alert and oriented to person, place, and time.      Deep Tendon Reflexes: Reflexes are normal and symmetric.   Psychiatric:         Behavior: Behavior normal.         Thought Content: Thought content normal.         Judgment: Judgment normal.         All  tests have been reviewed.    Assessment & Plan          1. Patient Counseling:  --Nutrition: Stressed importance of moderation in sodium/caffeine intake, saturated fat and cholesterol, caloric balance, sufficient intake of fresh fruits, vegetables, fiber, calcium and iron.  --Exercise: Stressed the importance of regular exercise.   --Injury prevention: Discussed safety belts, safety helmets, smoke detector, smoking near bedding or upholstery.   --Dental health: Discussed importance of regular tooth brushing, flossing, and dental visits.  --Immunizations reviewed.  --Discussed benefits of screening colonoscopy.  --After hours service discussed with patient    2. Discussed the patient's BMI with him.    Depression, stable without medication    Chronic pain of right knee mild continue to watch    Benign prostatic hyperplasia with lower urinary tract symptoms, symptom details unspecified PSA elevation pending biopsy    B12 deficiency stable supplement    Hyperglycemia diet    Hypothyroidism, unspecified type stable medication    Vitamin D deficiency c watch    Hyperlipidemia, continue medication    PAC (premature atrial  contraction) continue medication    Allergy, subsequent encounter stable    Otitis externa of right ear, unspecified chronicity, unspecified type initiate medication  -     amoxicillin-clavulanate (Augmentin) 875-125 MG per tablet; Take 1 tablet by mouth 2 (Two) Times a Day.         Answers for HPI/ROS submitted by the patient on 8/8/2022  What is the primary reason for your visit?: Physical

## 2022-08-25 ENCOUNTER — HOSPITAL ENCOUNTER (OUTPATIENT)
Dept: MRI IMAGING | Facility: HOSPITAL | Age: 67
Discharge: HOME | End: 2022-08-25
Admitting: PHYSICIAN ASSISTANT

## 2022-08-25 DIAGNOSIS — R97.20 ELEVATED PSA: ICD-10-CM

## 2022-08-25 PROCEDURE — A9577 INJ MULTIHANCE: HCPCS | Performed by: PHYSICIAN ASSISTANT

## 2022-08-25 PROCEDURE — 0 GADOBENATE DIMEGLUMINE 529 MG/ML SOLUTION: Performed by: PHYSICIAN ASSISTANT

## 2022-08-25 PROCEDURE — 72197 MRI PELVIS W/O & W/DYE: CPT

## 2022-08-25 RX ADMIN — GADOBENATE DIMEGLUMINE 20 ML: 529 INJECTION, SOLUTION INTRAVENOUS at 15:38

## 2022-08-29 ENCOUNTER — OFFICE VISIT (OUTPATIENT)
Dept: UROLOGY | Facility: CLINIC | Age: 67
End: 2022-08-29

## 2022-08-29 VITALS
OXYGEN SATURATION: 98 % | HEIGHT: 74 IN | SYSTOLIC BLOOD PRESSURE: 142 MMHG | DIASTOLIC BLOOD PRESSURE: 80 MMHG | WEIGHT: 220 LBS | TEMPERATURE: 96.8 F | BODY MASS INDEX: 28.23 KG/M2 | HEART RATE: 60 BPM

## 2022-08-29 DIAGNOSIS — R97.20 ELEVATED PSA: Primary | ICD-10-CM

## 2022-08-29 PROCEDURE — 99214 OFFICE O/P EST MOD 30 MIN: CPT | Performed by: UROLOGY

## 2022-08-29 RX ORDER — CEPHALEXIN 500 MG/1
500 CAPSULE ORAL 2 TIMES DAILY
Qty: 6 CAPSULE | Refills: 0 | Status: SHIPPED | OUTPATIENT
Start: 2022-08-29 | End: 2022-09-01

## 2022-08-29 RX ORDER — CIPROFLOXACIN 500 MG/1
500 TABLET, FILM COATED ORAL 2 TIMES DAILY
Qty: 6 TABLET | Refills: 0 | Status: SHIPPED | OUTPATIENT
Start: 2022-08-29 | End: 2022-12-02

## 2022-08-29 RX ORDER — MAGNESIUM HYDROXIDE 1200 MG/15ML
1 LIQUID ORAL ONCE
Qty: 1 ENEMA | Refills: 0 | Status: SHIPPED | OUTPATIENT
Start: 2022-08-29 | End: 2022-08-29

## 2022-08-29 NOTE — PROGRESS NOTES
"Chief Complaint   Patient presents with   • Follow-up     MRI results          HPI  Ms. Arnold is a 66 y.o. male with history below in assessment, who presents for follow up.     At this visit here to discuss MRI    Past Medical History:   Diagnosis Date   • Acid reflux    • Benign colon polyp 1/12/2021   • Colon polyp    • Colon polyp    • Erectile dysfunction    • Heart murmur        Past Surgical History:   Procedure Laterality Date   • COLONOSCOPY           Current Outpatient Medications:   •  levothyroxine (SYNTHROID, LEVOTHROID) 88 MCG tablet, 1 daily po, Disp: 90 tablet, Rfl: 3  •  metoprolol succinate XL (TOPROL-XL) 25 MG 24 hr tablet, Take 1 tablet by mouth Daily., Disp: 90 tablet, Rfl: 3  •  rosuvastatin (CRESTOR) 10 MG tablet, Take 1 tablet by mouth Daily., Disp: 90 tablet, Rfl: 3  •  vitamin B-12 (CYANOCOBALAMIN) 1000 MCG tablet, Take 1,000 mcg by mouth Daily., Disp: , Rfl:   •  amoxicillin-clavulanate (Augmentin) 875-125 MG per tablet, Take 1 tablet by mouth 2 (Two) Times a Day., Disp: 14 tablet, Rfl: 0     Physical Exam  Visit Vitals  /80 (BP Location: Left arm, Patient Position: Sitting, Cuff Size: Adult)   Pulse 60   Temp 96.8 °F (36 °C) (Temporal)   Ht 188 cm (74\")   Wt 99.8 kg (220 lb)   SpO2 98%   BMI 28.25 kg/m²       Labs  Brief Urine Lab Results  (Last result in the past 365 days)      Color   Clarity   Blood   Leuk Est   Nitrite   Protein   CREAT   Urine HCG        06/20/22 0824 Yellow   Clear   Negative   Negative   Negative   Negative                 Lab Results   Component Value Date    GLUCOSE 99 05/16/2022    CALCIUM 9.3 05/16/2022     05/16/2022    K 4.3 05/16/2022    CO2 21.6 (L) 05/16/2022     05/16/2022    BUN 13 05/16/2022    CREATININE 1.08 05/16/2022    EGFRIFAFRI 75 11/19/2021    EGFRIFNONA 62 11/19/2021    BCR 12.0 05/16/2022       Lab Results   Component Value Date    WBC 7.45 11/19/2021    HGB 15.1 11/19/2021    HCT 46.0 11/19/2021    MCV 88.1 11/19/2021    PLT " 215 11/19/2021            Lab Results   Component Value Date    PSA 7.8 (H) 06/21/2022    PSA 7.630 (H) 05/16/2022    PSA 3.1 03/11/2021           Radiographic Studies  No Images in the past 120 days found..      I have reviewed above labs and imaging.     Assessment  66 y.o. male with history of elevated PSA.  He has had 2 previous negative biopsies, last one at least 8 years ago.  He had a prostate MRI 12 months ago that was PI-RADS 2, findings of BPH.  Finasteride was stopped at that time. 4K score was obtained given that his PSA increased from 3 to 7 over that 14-month period.  Unfortunately, he is high risk for clinically significant prostate cancer, based on 4K score.  I consider the rising PSA and new diagnosis of uncertain prognosis.    On personal review of MRI, there is a small hypodense area in the right peripheral zone at the mid to apex on T2 axials    Plan  1. Schedule for TRUS Bx with Nitrous.  Antibiotics sent in.

## 2022-09-19 ENCOUNTER — PROCEDURE VISIT (OUTPATIENT)
Dept: UROLOGY | Facility: CLINIC | Age: 67
End: 2022-09-19

## 2022-09-19 DIAGNOSIS — R97.20 ELEVATED PSA: Primary | ICD-10-CM

## 2022-09-19 PROCEDURE — 76942 ECHO GUIDE FOR BIOPSY: CPT | Performed by: UROLOGY

## 2022-09-19 PROCEDURE — 55700 PR PROSTATE NEEDLE BIOPSY ANY APPROACH: CPT | Performed by: UROLOGY

## 2022-09-19 NOTE — PROGRESS NOTES
TRUS BIOPSY OF PROSTATE    Preoperative diagnosis  Elevated PSA    Postoperative diagnosis  Elevated PSA    Procedure  1.  Transrectal ultrasound of the prostate  2.  Transrectal ultrasound guidance of needle biopsy  3.  Prostate biopsy    Attending Surgeon  Eduardo Pandya MD    Anesthesia  2% lidocaine jelly, intrarectal instillation, 10mL  1% lidocaine solution, periprostatic injection, 10mL  Nitrous oxide 50% inhaled    Complications  None    Specimen  Prostate biopsy x 18    Indications  Mr. Arnold is a 67 y.o. year old male with an elevated PSA:   Lab Results   Component Value Date    PSA 7.8 (H) 06/21/2022    PSA 7.630 (H) 05/16/2022    PSA 3.1 03/11/2021           After discussing his options, the patient decided to proceed with prostate biopsy.  Informed consent was obtained. Possible complications were discussed with the patient during his last visit including, but not limited to, hematuria, hematochezia, prostatitis, urinary tract infection, sepsis, and urinary retention.  He did start the prescribed oral antibiotic regimen.    Procedure  The patient was positioned and prepped in a left lateral position with lower extremities flexed.  The patient received inhaled 50% nitrous oxide during the procedure.  Lidocaine jelly, 2%, was injected per rectum. A digital rectal exam was performed.The [employer]ambo[employer] E8CS rectal ultrasound probe was slowly introduced into the rectum without difficulty.  The prostate and seminal vesicles were inspected systematically using cross and sagittal views with the ultrasound.  The dimensions of the prostate were measured, for a calculated volume of 60 mL.  Using a true cut 14 Fr biopsy needle, 18 prostate cores were collected. The specific locations were the following: left lateral base, left lateral mid, left lateral apex, left medial base, left medial mid, and left medial apex, right lateral base, right lateral mid, right lateral apex, right medial base, right medial mid, right medial apex,  and right and left transition zones.  I took extra cores on the right side at the apex and medial aspects.  The rectal ultrasound probe was removed.  A SUNITA was again performed revealing little blood in the rectum.  The patient tolerated the procedure well.    Plan  1.  The patient was instructed to drink plenty of fluids and warned about possible complications and side effects including, but not limited to, blood in the urine, stool and semen as well as bloodstream infection.  He was instructed to call the office if there are any issues, especially fevers or flu-like symptoms.    2.  Continue antibiotic for a total of 3 days.  3.  The patient will return to clinic in approximately 1 week for discussion of the pathological report.

## 2022-09-22 LAB — REF LAB TEST METHOD: NORMAL

## 2022-09-26 ENCOUNTER — OFFICE VISIT (OUTPATIENT)
Dept: UROLOGY | Facility: CLINIC | Age: 67
End: 2022-09-26

## 2022-09-26 VITALS
OXYGEN SATURATION: 98 % | SYSTOLIC BLOOD PRESSURE: 142 MMHG | DIASTOLIC BLOOD PRESSURE: 82 MMHG | TEMPERATURE: 96.4 F | HEART RATE: 51 BPM | BODY MASS INDEX: 28.23 KG/M2 | WEIGHT: 220 LBS | HEIGHT: 74 IN

## 2022-09-26 DIAGNOSIS — R97.20 ELEVATED PSA: Primary | ICD-10-CM

## 2022-09-26 PROCEDURE — 99213 OFFICE O/P EST LOW 20 MIN: CPT | Performed by: UROLOGY

## 2022-09-26 NOTE — PROGRESS NOTES
"Chief Complaint   Patient presents with   • Follow-up     1 Wk Pathology results          HPI  Ms. Arnold is a 67 y.o. male with history below in assessment, who presents for follow up.     At this visit having decreased stream since the biopsy, though it is improving.  He denies any dysuria.    Past Medical History:   Diagnosis Date   • Acid reflux    • Benign colon polyp 1/12/2021   • Colon polyp    • Colon polyp    • Erectile dysfunction    • Heart murmur        Past Surgical History:   Procedure Laterality Date   • COLONOSCOPY           Current Outpatient Medications:   •  levothyroxine (SYNTHROID, LEVOTHROID) 88 MCG tablet, 1 daily po, Disp: 90 tablet, Rfl: 3  •  metoprolol succinate XL (TOPROL-XL) 25 MG 24 hr tablet, Take 1 tablet by mouth Daily., Disp: 90 tablet, Rfl: 3  •  rosuvastatin (CRESTOR) 10 MG tablet, Take 1 tablet by mouth Daily., Disp: 90 tablet, Rfl: 3  •  vitamin B-12 (CYANOCOBALAMIN) 1000 MCG tablet, Take 1,000 mcg by mouth Daily., Disp: , Rfl:   •  ciprofloxacin (Cipro) 500 MG tablet, Take 1 tablet by mouth 2 (Two) Times a Day. Start taking the day prior to biopsy, Disp: 6 tablet, Rfl: 0     Physical Exam  Visit Vitals  /82 (BP Location: Right arm, Patient Position: Sitting, Cuff Size: Adult)   Pulse 51   Temp 96.4 °F (35.8 °C) (Temporal)   Ht 188 cm (74\")   Wt 99.8 kg (220 lb)   SpO2 98%   BMI 28.25 kg/m²       Labs  Brief Urine Lab Results  (Last result in the past 365 days)      Color   Clarity   Blood   Leuk Est   Nitrite   Protein   CREAT   Urine HCG        06/20/22 0824 Yellow   Clear   Negative   Negative   Negative   Negative                 Lab Results   Component Value Date    GLUCOSE 99 05/16/2022    CALCIUM 9.3 05/16/2022     05/16/2022    K 4.3 05/16/2022    CO2 21.6 (L) 05/16/2022     05/16/2022    BUN 13 05/16/2022    CREATININE 1.08 05/16/2022    EGFRIFAFRI 75 11/19/2021    EGFRIFNONA 62 11/19/2021    BCR 12.0 05/16/2022       Lab Results   Component Value Date    " WBC 7.45 11/19/2021    HGB 15.1 11/19/2021    HCT 46.0 11/19/2021    MCV 88.1 11/19/2021     11/19/2021            Lab Results   Component Value Date    PSA 7.8 (H) 06/21/2022    PSA 7.630 (H) 05/16/2022    PSA 3.1 03/11/2021             Radiographic Studies  MRI Prostate With & Without Contrast  Result Date: 9/1/2022  1. No discrete targetable suspicious PIRADS 3 or greater lesion. 2. Prostatomegaly with marked enlargement of the transitional zone with multiple BPH nodules and prominent median lobe projecting into the bladder. Overall appearance of transitional zone nodules similar to exam from 06/14/2021. 3. Borderline enlarged left internal iliac chain 8 mm node which is nonspecific but stable from prior exam.  Overall PI-RADS 2 Exam      This report was finalized on 9/1/2022 8:53 AM by Ellis Lamar MD.          I have reviewed above labs and imaging.     Assessment  67 y.o. male with with history of elevated PSA.  He has had 2 previous negative biopsies, last one at least 8 years ago.  He had a prostate MRI 12 months ago that was PI-RADS 2, findings of BPH.  Finasteride was stopped at that time. 4K score was obtained given that his PSA increased from 3 to 7 over that 14-month period, which showed elevated risk of prostate cancer, however repeat MRI and prostate biopsy were once again negative.  He has therefore had 3 negative biopsies at this point.      Plan  1. FU in 1 yr w/ PSA; if stable we will stop checking

## 2022-11-27 ENCOUNTER — APPOINTMENT (OUTPATIENT)
Dept: GENERAL RADIOLOGY | Facility: HOSPITAL | Age: 67
End: 2022-11-27

## 2022-11-27 ENCOUNTER — HOSPITAL ENCOUNTER (EMERGENCY)
Facility: HOSPITAL | Age: 67
Discharge: HOME | End: 2022-11-27
Attending: EMERGENCY MEDICINE | Admitting: EMERGENCY MEDICINE

## 2022-11-27 VITALS
BODY MASS INDEX: 29.12 KG/M2 | WEIGHT: 215 LBS | DIASTOLIC BLOOD PRESSURE: 87 MMHG | SYSTOLIC BLOOD PRESSURE: 145 MMHG | HEIGHT: 72 IN | TEMPERATURE: 98.6 F | RESPIRATION RATE: 16 BRPM | HEART RATE: 56 BPM | OXYGEN SATURATION: 98 %

## 2022-11-27 DIAGNOSIS — R07.9 CHEST PAIN, UNSPECIFIED TYPE: Primary | ICD-10-CM

## 2022-11-27 LAB
ALBUMIN SERPL-MCNC: 4.2 G/DL (ref 3.5–5.2)
ALBUMIN/GLOB SERPL: 1.4 G/DL
ALP SERPL-CCNC: 57 U/L (ref 39–117)
ALT SERPL W P-5'-P-CCNC: 23 U/L (ref 1–41)
ANION GAP SERPL CALCULATED.3IONS-SCNC: 10.1 MMOL/L (ref 5–15)
AST SERPL-CCNC: 18 U/L (ref 1–40)
BASOPHILS # BLD AUTO: 0.08 10*3/MM3 (ref 0–0.2)
BASOPHILS NFR BLD AUTO: 1 % (ref 0–1.5)
BILIRUB SERPL-MCNC: 0.3 MG/DL (ref 0–1.2)
BUN SERPL-MCNC: 17 MG/DL (ref 8–23)
BUN/CREAT SERPL: 16.8 (ref 7–25)
CALCIUM SPEC-SCNC: 9 MG/DL (ref 8.6–10.5)
CHLORIDE SERPL-SCNC: 105 MMOL/L (ref 98–107)
CO2 SERPL-SCNC: 24.9 MMOL/L (ref 22–29)
CREAT SERPL-MCNC: 1.01 MG/DL (ref 0.76–1.27)
DEPRECATED RDW RBC AUTO: 43.8 FL (ref 37–54)
EGFRCR SERPLBLD CKD-EPI 2021: 80.5 ML/MIN/1.73
EOSINOPHIL # BLD AUTO: 0.43 10*3/MM3 (ref 0–0.4)
EOSINOPHIL NFR BLD AUTO: 5.2 % (ref 0.3–6.2)
ERYTHROCYTE [DISTWIDTH] IN BLOOD BY AUTOMATED COUNT: 14.1 % (ref 12.3–15.4)
GLOBULIN UR ELPH-MCNC: 3 GM/DL
GLUCOSE SERPL-MCNC: 86 MG/DL (ref 65–99)
HCT VFR BLD AUTO: 43.1 % (ref 37.5–51)
HGB BLD-MCNC: 14.7 G/DL (ref 13–17.7)
HOLD SPECIMEN: NORMAL
HOLD SPECIMEN: NORMAL
IMM GRANULOCYTES # BLD AUTO: 0.03 10*3/MM3 (ref 0–0.05)
IMM GRANULOCYTES NFR BLD AUTO: 0.4 % (ref 0–0.5)
LYMPHOCYTES # BLD AUTO: 2.64 10*3/MM3 (ref 0.7–3.1)
LYMPHOCYTES NFR BLD AUTO: 31.7 % (ref 19.6–45.3)
MCH RBC QN AUTO: 29.2 PG (ref 26.6–33)
MCHC RBC AUTO-ENTMCNC: 34.1 G/DL (ref 31.5–35.7)
MCV RBC AUTO: 85.5 FL (ref 79–97)
MONOCYTES # BLD AUTO: 1.05 10*3/MM3 (ref 0.1–0.9)
MONOCYTES NFR BLD AUTO: 12.6 % (ref 5–12)
NEUTROPHILS NFR BLD AUTO: 4.11 10*3/MM3 (ref 1.7–7)
NEUTROPHILS NFR BLD AUTO: 49.1 % (ref 42.7–76)
NRBC BLD AUTO-RTO: 0 /100 WBC (ref 0–0.2)
PLATELET # BLD AUTO: 246 10*3/MM3 (ref 140–450)
PMV BLD AUTO: 10.5 FL (ref 6–12)
POTASSIUM SERPL-SCNC: 4 MMOL/L (ref 3.5–5.2)
PROT SERPL-MCNC: 7.2 G/DL (ref 6–8.5)
RBC # BLD AUTO: 5.04 10*6/MM3 (ref 4.14–5.8)
SODIUM SERPL-SCNC: 140 MMOL/L (ref 136–145)
TROPONIN T SERPL-MCNC: <0.01 NG/ML (ref 0–0.03)
TROPONIN T SERPL-MCNC: <0.01 NG/ML (ref 0–0.03)
WBC NRBC COR # BLD: 8.34 10*3/MM3 (ref 3.4–10.8)
WHOLE BLOOD HOLD COAG: NORMAL
WHOLE BLOOD HOLD SPECIMEN: NORMAL

## 2022-11-27 PROCEDURE — 99283 EMERGENCY DEPT VISIT LOW MDM: CPT

## 2022-11-27 PROCEDURE — 71045 X-RAY EXAM CHEST 1 VIEW: CPT

## 2022-11-27 PROCEDURE — 80053 COMPREHEN METABOLIC PANEL: CPT | Performed by: EMERGENCY MEDICINE

## 2022-11-27 PROCEDURE — 93005 ELECTROCARDIOGRAM TRACING: CPT

## 2022-11-27 PROCEDURE — 84484 ASSAY OF TROPONIN QUANT: CPT | Performed by: EMERGENCY MEDICINE

## 2022-11-27 PROCEDURE — 93005 ELECTROCARDIOGRAM TRACING: CPT | Performed by: EMERGENCY MEDICINE

## 2022-11-27 PROCEDURE — 36415 COLL VENOUS BLD VENIPUNCTURE: CPT

## 2022-11-27 PROCEDURE — 85025 COMPLETE CBC W/AUTO DIFF WBC: CPT | Performed by: EMERGENCY MEDICINE

## 2022-11-27 RX ORDER — SODIUM CHLORIDE 0.9 % (FLUSH) 0.9 %
10 SYRINGE (ML) INJECTION AS NEEDED
Status: DISCONTINUED | OUTPATIENT
Start: 2022-11-27 | End: 2022-11-28 | Stop reason: HOSPADM

## 2022-12-02 ENCOUNTER — OFFICE VISIT (OUTPATIENT)
Dept: CARDIOLOGY | Facility: CLINIC | Age: 67
End: 2022-12-02

## 2022-12-02 VITALS
OXYGEN SATURATION: 99 % | HEIGHT: 72 IN | DIASTOLIC BLOOD PRESSURE: 92 MMHG | HEART RATE: 76 BPM | SYSTOLIC BLOOD PRESSURE: 144 MMHG | BODY MASS INDEX: 29.12 KG/M2 | WEIGHT: 215 LBS

## 2022-12-02 DIAGNOSIS — R06.09 DOE (DYSPNEA ON EXERTION): ICD-10-CM

## 2022-12-02 DIAGNOSIS — I10 PRIMARY HYPERTENSION: ICD-10-CM

## 2022-12-02 DIAGNOSIS — R07.2 PRECORDIAL PAIN: Primary | ICD-10-CM

## 2022-12-02 DIAGNOSIS — E78.5 DYSLIPIDEMIA: ICD-10-CM

## 2022-12-02 DIAGNOSIS — I49.1 PAC (PREMATURE ATRIAL CONTRACTION): ICD-10-CM

## 2022-12-02 PROCEDURE — 99203 OFFICE O/P NEW LOW 30 MIN: CPT | Performed by: INTERNAL MEDICINE

## 2022-12-02 RX ORDER — LORATADINE 10 MG/1
TABLET ORAL
COMMUNITY
Start: 2022-08-01

## 2022-12-02 NOTE — PROGRESS NOTES
"    Subjective:     Encounter Date:12/02/2022      Patient ID: Carmelo Arnold is a 67 y.o. male.    Chief Complaint: Chest discomfort  HPI  This is a 67-year-old male patient with no history of known heart disease who presents to cardiology clinic for evaluation of chest discomfort.  The patient describes a diffuse anterior tightness sensation with radiation into his left jaw and down his left arm.  The patient indicates he has had the discomfort \"off and on\" for many years.  He had a severe episode this past Sunday which prompted evaluation in the emergency room.  His twelve-lead electrocardiogram showed sinus rhythm with atrial bigeminy but no ischemic ST-T wave changes or injury current.  Cardiac troponins were serially normal.  The patient indicates that since evaluation in the emergency room this weekend he has had for 5 episodes of similar discomfort this week.  He indicates that each episode lasts no more than 1 to 2 minutes.  It has a 2/10 intensity and is associated with shortness of breath but no nausea vomiting or diaphoresis.  The discomfort occurs both at rest and with exertional activities.  He cannot identify any precipitating aggravating or alleviating features.  He reports having a treadmill stress test several years ago in [place] for similar chest discomfort and palpitations.  At that time he was diagnosed with PACs.  He has a history of hypertension and dyslipidemia both of which are treated.  He is a non-smoker.  He reports having shortness of breath with exertional activities which is relieved with rest.  The frequency duration and intensity have escalated over the last few weeks.  He has no orthopnea PND or lower extremity edema.  He has no personal history of myocardial infarction or coronary revascularization.  He has no known history of valvular heart disease or congestive heart failure.  The following portions of the patient's history were reviewed and updated as appropriate: allergies, " current medications, past family history, past medical history, past social history, past surgical history and problem  Review of Systems   Constitutional: Negative for chills, diaphoresis, fever, malaise/fatigue, weight gain and weight loss.   HENT: Negative for ear discharge, hearing loss, hoarse voice and nosebleeds.    Eyes: Negative for discharge, double vision, pain and photophobia.   Cardiovascular: Positive for chest pain and dyspnea on exertion. Negative for claudication, cyanosis, irregular heartbeat, leg swelling, near-syncope, orthopnea, palpitations, paroxysmal nocturnal dyspnea and syncope.   Respiratory: Positive for shortness of breath. Negative for cough, hemoptysis, sputum production and wheezing.    Endocrine: Negative for cold intolerance, heat intolerance, polydipsia, polyphagia and polyuria.   Hematologic/Lymphatic: Negative for adenopathy and bleeding problem. Does not bruise/bleed easily.   Skin: Negative for color change, flushing, itching and rash.   Musculoskeletal: Negative for muscle cramps, muscle weakness, myalgias and stiffness.   Gastrointestinal: Negative for abdominal pain, diarrhea, hematemesis, hematochezia, nausea and vomiting.   Genitourinary: Negative for dysuria, frequency and nocturia.   Neurological: Negative for focal weakness, loss of balance, numbness, paresthesias and seizures.   Psychiatric/Behavioral: Negative for altered mental status, hallucinations and suicidal ideas.   Allergic/Immunologic: Negative for HIV exposure, hives and persistent infections.           Current Outpatient Medications:   •  levothyroxine (SYNTHROID, LEVOTHROID) 88 MCG tablet, 1 daily po, Disp: 90 tablet, Rfl: 3  •  loratadine (CLARITIN) 10 MG tablet, , Disp: , Rfl:   •  metoprolol succinate XL (TOPROL-XL) 25 MG 24 hr tablet, Take 1 tablet by mouth Daily., Disp: 90 tablet, Rfl: 3  •  rosuvastatin (CRESTOR) 10 MG tablet, Take 1 tablet by mouth Daily., Disp: 90 tablet, Rfl: 3  •  vitamin B-12  "(CYANOCOBALAMIN) 1000 MCG tablet, Take 1,000 mcg by mouth Daily., Disp: , Rfl:     Objective:   Vitals and nursing note reviewed.   Constitutional:       Appearance: Healthy appearance. Not in distress.   Eyes:      Conjunctiva/sclera: Conjunctivae normal.   Neck:      Thyroid: No thyromegaly.      Vascular: No JVR. JVD normal.      Lymphadenopathy: No cervical adenopathy.   Pulmonary:      Effort: Pulmonary effort is normal.      Breath sounds: Normal breath sounds. No wheezing. No rhonchi. No rales.   Chest:      Chest wall: Not tender to palpatation.   Cardiovascular:      PMI at left midclavicular line. Normal rate. Regular rhythm. Normal S1. Normal S2.      Murmurs: There is no murmur.      No gallop. No click. No rub.   Pulses:     Intact distal pulses.   Edema:     Peripheral edema absent.   Abdominal:      General: Bowel sounds are normal.      Palpations: Abdomen is soft.      Tenderness: There is no abdominal tenderness.   Musculoskeletal: Normal range of motion.         General: No tenderness. Skin:     General: Skin is warm and dry.   Neurological:      General: No focal deficit present.      Mental Status: Alert and oriented to person, place and time.       Blood pressure 144/92, pulse 76, height 182.9 cm (72\"), weight 97.5 kg (215 lb), SpO2 99 %.   Lab Review:     Assessment:       1. Precordial pain  Typical and atypical features.  Intermediate likelihood of coronary artery disease.  Multiple risk factors for coronary artery disease.  The patient has not had an ischemic evaluation in the last 5 years.    2. ROSS (dyspnea on exertion)  Shortness of breath with activity that is relieved with rest is compatible with the diagnosis of angina pectoris.    3. Primary hypertension  Less than ideal blood pressure control.  The patient's blood pressure has been elevated on his last 4 visits.    4. Dyslipidemia  Tolerating statin based cholesterol-lowering therapy without side effects.    Procedures    Plan: "     Advance Care Planning   ACP discussion was held with the patient during this visit. Patient has an advance directive (not in EMR), copy requested.     I have recommended a treadmill exercise stress test.  The patient is instructed to hold his beta-blocker the day before and morning of stress testing.    I have recommended an echocardiogram.    Further recommendations to be predicated on the results of his outpatient testing.    The patient will likely require escalation of his antihypertensive therapy.    The patient has been advised to consider taking an enteric-coated baby aspirin once per day.

## 2022-12-07 ENCOUNTER — OFFICE VISIT (OUTPATIENT)
Dept: INTERNAL MEDICINE | Facility: CLINIC | Age: 67
End: 2022-12-07

## 2022-12-07 VITALS
HEART RATE: 74 BPM | OXYGEN SATURATION: 98 % | HEIGHT: 72 IN | SYSTOLIC BLOOD PRESSURE: 154 MMHG | DIASTOLIC BLOOD PRESSURE: 98 MMHG | BODY MASS INDEX: 29.93 KG/M2 | TEMPERATURE: 96.9 F | WEIGHT: 221 LBS

## 2022-12-07 DIAGNOSIS — E53.8 B12 DEFICIENCY: ICD-10-CM

## 2022-12-07 DIAGNOSIS — E03.9 ACQUIRED HYPOTHYROIDISM: ICD-10-CM

## 2022-12-07 DIAGNOSIS — E55.9 VITAMIN D DEFICIENCY, UNSPECIFIED: ICD-10-CM

## 2022-12-07 DIAGNOSIS — G89.29 CHRONIC PAIN OF RIGHT KNEE: ICD-10-CM

## 2022-12-07 DIAGNOSIS — K63.5 BENIGN COLON POLYP: ICD-10-CM

## 2022-12-07 DIAGNOSIS — F32.0 CURRENT MILD EPISODE OF MAJOR DEPRESSIVE DISORDER WITHOUT PRIOR EPISODE: ICD-10-CM

## 2022-12-07 DIAGNOSIS — N40.1 BENIGN PROSTATIC HYPERPLASIA WITH LOWER URINARY TRACT SYMPTOMS, SYMPTOM DETAILS UNSPECIFIED: ICD-10-CM

## 2022-12-07 DIAGNOSIS — M25.561 CHRONIC PAIN OF RIGHT KNEE: ICD-10-CM

## 2022-12-07 DIAGNOSIS — E55.9 VITAMIN D DEFICIENCY: ICD-10-CM

## 2022-12-07 DIAGNOSIS — Z00.00 WELLNESS EXAMINATION: ICD-10-CM

## 2022-12-07 DIAGNOSIS — K43.6 VENTRAL HERNIA WITH OBSTRUCTION AND WITHOUT GANGRENE: ICD-10-CM

## 2022-12-07 DIAGNOSIS — R07.2 PRECORDIAL PAIN: ICD-10-CM

## 2022-12-07 DIAGNOSIS — I49.1 PAC (PREMATURE ATRIAL CONTRACTION): ICD-10-CM

## 2022-12-07 DIAGNOSIS — R73.9 HYPERGLYCEMIA: ICD-10-CM

## 2022-12-07 DIAGNOSIS — E78.5 DYSLIPIDEMIA: ICD-10-CM

## 2022-12-07 DIAGNOSIS — I10 PRIMARY HYPERTENSION: Primary | ICD-10-CM

## 2022-12-07 PROBLEM — T78.40XA ALLERGY: Status: RESOLVED | Noted: 2021-01-12 | Resolved: 2022-12-07

## 2022-12-07 PROBLEM — R06.09 DOE (DYSPNEA ON EXERTION): Status: RESOLVED | Noted: 2021-01-12 | Resolved: 2022-12-07

## 2022-12-07 PROCEDURE — 1170F FXNL STATUS ASSESSED: CPT | Performed by: INTERNAL MEDICINE

## 2022-12-07 PROCEDURE — 99214 OFFICE O/P EST MOD 30 MIN: CPT | Performed by: INTERNAL MEDICINE

## 2022-12-07 PROCEDURE — 1159F MED LIST DOCD IN RCRD: CPT | Performed by: INTERNAL MEDICINE

## 2022-12-07 PROCEDURE — G0439 PPPS, SUBSEQ VISIT: HCPCS | Performed by: INTERNAL MEDICINE

## 2022-12-07 RX ORDER — LISINOPRIL AND HYDROCHLOROTHIAZIDE 12.5; 1 MG/1; MG/1
1 TABLET ORAL DAILY
Qty: 30 TABLET | Refills: 1 | Status: SHIPPED | OUTPATIENT
Start: 2022-12-07 | End: 2023-02-07 | Stop reason: SDUPTHER

## 2022-12-07 NOTE — PROGRESS NOTES
Subsequent Medicare Wellness Visit    Subjective      Carmelo Arnold is a 67 y.o. male who presents for a Subsequent Medicare Wellness Visit.  Patient here for Medicare wellness also has medical problems to be discussed.  Patient complains recent chest pain with short of breath radiate to left upper arm patient is seeing cardiology preliminary GXT no significant abnormality.  Denies any stomach problem denies any palpitation or syncopal episode.  Patient is taking aspirin and metoprolol.  Blood pressure is elevatedToday 154/98 patient denies any blurry vision or headache.  Hypothyroidism stable on medication hyperlipidemia stable on medication  The following portions of the patient's history were reviewed and   updated as appropriate: allergies, current medications, past family history, past medical history, past social history, past surgical history and problem list.    Compared to one year ago, the patient feels his physical   health is the same.    Compared to one year ago, the patient feels his mental   health is the same.    Recent Hospitalizations:  He was not admitted to the hospital during the last year.       Current Medical Providers:  Patient Care Team:  Salas Meléndez MD as PCP - General (Internal Medicine)  Negrita Herrera MD as Consulting Physician (General Surgery)  Negrita Herrera MD as Consulting Physician (General Surgery)    Outpatient Medications Prior to Visit   Medication Sig Dispense Refill   • Aspirin 81 MG capsule Take  by mouth.     • levothyroxine (SYNTHROID, LEVOTHROID) 88 MCG tablet 1 daily po 90 tablet 3   • loratadine (CLARITIN) 10 MG tablet      • metoprolol succinate XL (TOPROL-XL) 25 MG 24 hr tablet Take 1 tablet by mouth Daily. 90 tablet 3   • rosuvastatin (CRESTOR) 10 MG tablet Take 1 tablet by mouth Daily. 90 tablet 3   • vitamin B-12 (CYANOCOBALAMIN) 1000 MCG tablet Take 1,000 mcg by mouth Daily.       No facility-administered medications prior to visit.       No opioid  "medication identified on active medication list. I have reviewed chart for other potential  high risk medication/s and harmful drug interactions in the elderly.          Aspirin is on active medication list. Aspirin use is indicated based on review of current medical condition/s. Pros and cons of this therapy have been discussed today. Benefits of this medication outweigh potential harm.  Patient has been encouraged to continue taking this medication.  .      Patient Active Problem List   Diagnosis   • Vitamin D deficiency   • Benign prostatic hyperplasia with lower urinary tract symptoms   • Hypothyroidism   • Dyslipidemia   • B12 deficiency   • PAC (premature atrial contraction)   • Ventral hernia   • Benign colon polyp   • Chronic pain of right knee   • ROSS (dyspnea on exertion)   • Hyperglycemia   • Depression   • Precordial pain   • Primary hypertension     Advance Care Planning  Advance Directive is not on file.  ACP discussion was held with the patient during this visit. Patient does not have an advance directive, declines further assistance.     Objective    Vitals:    12/07/22 1333   BP: 154/98   Pulse: 74   Temp: 96.9 °F (36.1 °C)   SpO2: 98%   Weight: 100 kg (221 lb)   Height: 182.9 cm (72\")     Estimated body mass index is 29.97 kg/m² as calculated from the following:    Height as of this encounter: 182.9 cm (72\").    Weight as of this encounter: 100 kg (221 lb).    BMI is >= 25 and <30. (Overweight) The following options were offered after discussion;: exercise counseling/recommendations and nutrition counseling/recommendations      Does the patient have evidence of cognitive impairment?   No            HEALTH RISK ASSESSMENT    Smoking Status:  Social History     Tobacco Use   Smoking Status Never   Smokeless Tobacco Never     Alcohol Consumption:  Social History     Substance and Sexual Activity   Alcohol Use Yes    Comment: Rarely      Fall Risk Screen:    ABHINAV Fall Risk Assessment was completed, " and patient is at LOW risk for falls.Assessment completed on:12/7/2022    Depression Screening:  PHQ-2/PHQ-9 Depression Screening 12/7/2022   Retired PHQ-9 Total Score -   Retired Total Score -   Little Interest or Pleasure in Doing Things 0-->not at all   Feeling Down, Depressed or Hopeless 1-->several days   Trouble Falling or Staying Asleep, or Sleeping Too Much -   Feeling Tired or Having Little Energy -   Poor Appetite or Overeating -   Feeling Bad about Yourself - or that You are a Failure or Have Let Yourself or Your Family Down -   Trouble Concentrating on Things, Such as Reading the Newspaper or Watching Television -   Moving or Speaking So Slowly that Other People Could Have Noticed? Or the Opposite - Being So Fidgety -   Thoughts that You Would be Better Off Dead or of Hurting Yourself in Some Way -   PHQ-9: Brief Depression Severity Measure Score 1       Health Habits and Functional and Cognitive Screening:  Functional & Cognitive Status 12/7/2022   Do you have difficulty preparing food and eating? No   Do you have difficulty bathing yourself, getting dressed or grooming yourself? No   Do you have difficulty using the toilet? No   Do you have difficulty moving around from place to place? No   Do you have trouble with steps or getting out of a bed or a chair? No   Current Diet Well Balanced Diet   Dental Exam Up to date        Dental Exam Comment 10/2022   Eye Exam Up to date        Eye Exam Comment 11/2022   Exercise (times per week) 0 times per week   Current Exercises Include No Regular Exercise   Current Exercise Activities Include -   Do you need help using the phone?  No   Are you deaf or do you have serious difficulty hearing?  Yes   Do you need help with transportation? No   Do you need help shopping? No   Do you need help preparing meals?  No   Do you need help with housework?  No   Do you need help with laundry? No   Do you need help taking your medications? No   Do you need help managing money?  No   Do you ever drive or ride in a car without wearing a seat belt? No   Have you felt unusual stress, anger or loneliness in the last month? No   Who do you live with? Spouse   If you need help, do you have trouble finding someone available to you? No   Have you been bothered in the last four weeks by sexual problems? No   Do you have difficulty concentrating, remembering or making decisions? Yes       Age-appropriate Screening Schedule:  Refer to the list below for future screening recommendations based on patient's age, sex and/or medical conditions. Orders for these recommended tests are listed in the plan section. The patient has been provided with a written plan.    Health Maintenance   Topic Date Due   • INFLUENZA VACCINE  03/31/2023 (Originally 8/1/2022)   • ZOSTER VACCINE (1 of 2) 09/05/2024 (Originally 9/4/2003)   • LIPID PANEL  05/16/2023   • TDAP/TD VACCINES (2 - Td or Tdap) 05/19/2026                CMS Preventative Services Quick Reference  Risk Factors Identified During Encounter:    Inactivity/Sedentary  Obesity/Overweight     The above risks/problems have been discussed with the patient.  Pertinent information has been shared with the patient in the After Visit Summary.    Diagnoses and all orders for this visit:    Emery Arnold is a 67 y.o. male.     Chief Complaint   Patient presents with   • Medicare Wellness-subsequent       History of Present Illness   Patient here for Medicare wellness also has medical problems to be discussed.  Patient complains recent chest pain with short of breath radiate to left upper arm patient is seeing cardiology preliminary GXT no significant abnormality.  Denies any stomach problem denies any palpitation or syncopal episode.  Patient is taking aspirin and metoprolol.  Blood pressure is elevatedToday 154/98 patient denies any blurry vision or headache.  Hypothyroidism stable on medication hyperlipidemia stable on medication    Current Outpatient  Medications:   •  Aspirin 81 MG capsule, Take  by mouth., Disp: , Rfl:   •  levothyroxine (SYNTHROID, LEVOTHROID) 88 MCG tablet, 1 daily po, Disp: 90 tablet, Rfl: 3  •  loratadine (CLARITIN) 10 MG tablet, , Disp: , Rfl:   •  metoprolol succinate XL (TOPROL-XL) 25 MG 24 hr tablet, Take 1 tablet by mouth Daily., Disp: 90 tablet, Rfl: 3  •  rosuvastatin (CRESTOR) 10 MG tablet, Take 1 tablet by mouth Daily., Disp: 90 tablet, Rfl: 3  •  vitamin B-12 (CYANOCOBALAMIN) 1000 MCG tablet, Take 1,000 mcg by mouth Daily., Disp: , Rfl:   •  lisinopril-hydrochlorothiazide (Zestoretic) 10-12.5 MG per tablet, Take 1 tablet by mouth Daily., Disp: 30 tablet, Rfl: 1    The following portions of the patient's history were reviewed and updated as appropriate: allergies, current medications, past family history, past medical history, past social history, past surgical history and problem list.    Review of Systems   Constitutional: Negative.    Respiratory: Negative.    Cardiovascular: Positive for chest pain.   Gastrointestinal: Negative.    Musculoskeletal: Negative.    Skin: Negative.    Neurological: Negative.    Psychiatric/Behavioral: Negative.        Objective   Physical Exam  Cardiovascular:      Rate and Rhythm: Normal rate and regular rhythm.      Heart sounds: Normal heart sounds.   Pulmonary:      Effort: Pulmonary effort is normal.      Breath sounds: Normal breath sounds.   Abdominal:      General: Bowel sounds are normal.   Musculoskeletal:         General: No tenderness.      Cervical back: Neck supple.   Skin:     General: Skin is warm.   Neurological:      Mental Status: He is alert and oriented to person, place, and time.         All tests have been reviewed.    Assessment & Plan   Diagnoses and all orders for this visit:    Primary hypertension  -     lisinopril-hydrochlorothiazide (Zestoretic) 10-12.5 MG per tablet; Take 1 tablet by mouth Daily.  -     Basic Metabolic Panel    Precordial pain    PAC (premature atrial  contraction)    Dyslipidemia    Vitamin D deficiency    Acquired hypothyroidism    Hyperglycemia    B12 deficiency    Ventral hernia with obstruction and without gangrene    Benign colon polyp    Benign prostatic hyperplasia with lower urinary tract symptoms, symptom details unspecified    Current mild episode of major depressive disorder without prior episode (HCC)    Chronic pain of right knee    Wellness examination  -     Lipid Panel  -     TSH  -     Vitamin D,25-Hydroxy    Vitamin D deficiency, unspecified  -     Vitamin D,25-Hydroxy    Other orders  -     Aspirin 81 MG capsule; Take  by mouth.        1. Primary hypertension (Primary) start med  -     lisinopril-hydrochlorothiazide (Zestoretic) 10-12.5 MG per tablet; Take 1 tablet by mouth Daily.  Dispense: 30 tablet; Refill: 1  -     Basic Metabolic Panel    2. Precordial pain follow cardio status post GXT    3. PAC (premature atrial contraction)    4. Dyslipidemia stable on rosuvastatin 10 mg daily cont med    5. Vitamin D deficiency    6. Acquired hypothyroidism stable on levothyroxine 88 mcg daily cont med    7. Hyperglycemia diet     8. B12 deficiency    9. Ventral hernia with obstruction and without gangrene    10. Benign colon polyp    11. Benign prostatic hyperplasia with lower urinary tract symptoms, symptom details unspecified cont med    12. Current mild episode of major depressive disorder without prior episode (HCC)    13. Chronic pain of right knee    15. Wellness examination  -     Lipid Panel  -     TSH  -     Vitamin D,25-Hydroxy    16. Vitamin D deficiency, unspecified  -     Vitamin D,25-Hydroxy             Follow Up:   Next Medicare Wellness visit to be scheduled in 1 mo after blood tests    An After Visit Summary and PPPS were made available to the patient.

## 2022-12-08 LAB
BH CV STRESS BP STAGE 1: NORMAL
BH CV STRESS DURATION MIN STAGE 1: 3
BH CV STRESS DURATION MIN STAGE 2: 3
BH CV STRESS DURATION SEC STAGE 1: 0
BH CV STRESS DURATION SEC STAGE 2: 0
BH CV STRESS GRADE STAGE 1: 10
BH CV STRESS GRADE STAGE 2: 12
BH CV STRESS HR STAGE 1: 122
BH CV STRESS HR STAGE 2: 131
BH CV STRESS METS STAGE 1: 5
BH CV STRESS METS STAGE 2: 7.5
BH CV STRESS O2 STAGE 1: 98
BH CV STRESS O2 STAGE 2: 98
BH CV STRESS PROTOCOL 1: NORMAL
BH CV STRESS RECOVERY BP: NORMAL MMHG
BH CV STRESS RECOVERY HR: 61 BPM
BH CV STRESS RECOVERY O2: 98 %
BH CV STRESS SPEED STAGE 1: 1.7
BH CV STRESS SPEED STAGE 2: 2.5
BH CV STRESS STAGE 1: 1
BH CV STRESS STAGE 2: 2
MAXIMAL PREDICTED HEART RATE: 151 BPM
PERCENT MAX PREDICTED HR: 86.75 %
STRESS BASELINE BP: NORMAL MMHG
STRESS BASELINE HR: 59 BPM
STRESS O2 SAT REST: 98 %
STRESS PERCENT HR: 102 %
STRESS POST ESTIMATED WORKLOAD: 4.6 METS
STRESS POST EXERCISE DUR MIN: 3 MIN
STRESS POST EXERCISE DUR SEC: 36 SEC
STRESS POST O2 SAT PEAK: 98 %
STRESS POST PEAK BP: NORMAL MMHG
STRESS POST PEAK HR: 131 BPM
STRESS TARGET HR: 128 BPM

## 2022-12-20 LAB
BH CV ECHO MEAS - AO MAX PG: 5.8 MMHG
BH CV ECHO MEAS - AO MEAN PG: 3 MMHG
BH CV ECHO MEAS - AO ROOT DIAM: 2.9 CM
BH CV ECHO MEAS - AO V2 MAX: 120 CM/SEC
BH CV ECHO MEAS - AO V2 VTI: 23.5 CM
BH CV ECHO MEAS - AVA(I,D): 3.8 CM2
BH CV ECHO MEAS - EDV(CUBED): 51.5 ML
BH CV ECHO MEAS - EDV(MOD-SP4): 108 ML
BH CV ECHO MEAS - EF(MOD-SP4): 63.3 %
BH CV ECHO MEAS - ESV(CUBED): 15.6 ML
BH CV ECHO MEAS - ESV(MOD-SP4): 39.6 ML
BH CV ECHO MEAS - FS: 32.8 %
BH CV ECHO MEAS - IVS/LVPW: 0.78 CM
BH CV ECHO MEAS - IVSD: 1.28 CM
BH CV ECHO MEAS - LA DIMENSION: 3.6 CM
BH CV ECHO MEAS - LAT PEAK E' VEL: 12.8 CM/SEC
BH CV ECHO MEAS - LV DIASTOLIC VOL/BSA (35-75): 49.2 CM2
BH CV ECHO MEAS - LV MASS(C)D: 202.4 GRAMS
BH CV ECHO MEAS - LV MAX PG: 4.5 MMHG
BH CV ECHO MEAS - LV MEAN PG: 2 MMHG
BH CV ECHO MEAS - LV SYSTOLIC VOL/BSA (12-30): 18 CM2
BH CV ECHO MEAS - LV V1 MAX: 106 CM/SEC
BH CV ECHO MEAS - LV V1 VTI: 22.3 CM
BH CV ECHO MEAS - LVIDD: 3.7 CM
BH CV ECHO MEAS - LVIDS: 2.5 CM
BH CV ECHO MEAS - LVOT AREA: 4 CM2
BH CV ECHO MEAS - LVOT DIAM: 2.25 CM
BH CV ECHO MEAS - LVPWD: 1.3 CM
BH CV ECHO MEAS - MED PEAK E' VEL: 10.2 CM/SEC
BH CV ECHO MEAS - MV A MAX VEL: 59.2 CM/SEC
BH CV ECHO MEAS - MV DEC SLOPE: 308 CM/SEC2
BH CV ECHO MEAS - MV DEC TIME: 0.25 MSEC
BH CV ECHO MEAS - MV E MAX VEL: 81 CM/SEC
BH CV ECHO MEAS - MV E/A: 1.37
BH CV ECHO MEAS - MV MAX PG: 3.9 MMHG
BH CV ECHO MEAS - MV MEAN PG: 1 MMHG
BH CV ECHO MEAS - MV V2 VTI: 38.5 CM
BH CV ECHO MEAS - MVA(VTI): 2.3 CM2
BH CV ECHO MEAS - PA V2 MAX: 81.8 CM/SEC
BH CV ECHO MEAS - RAP SYSTOLE: 3 MMHG
BH CV ECHO MEAS - RVSP: 14 MMHG
BH CV ECHO MEAS - SI(MOD-SP4): 31.1 ML/M2
BH CV ECHO MEAS - SV(LVOT): 88.7 ML
BH CV ECHO MEAS - SV(MOD-SP4): 68.4 ML
BH CV ECHO MEAS - TR MAX PG: 11.4 MMHG
BH CV ECHO MEAS - TR MAX VEL: 120.3 CM/SEC
BH CV ECHO MEASUREMENTS AVERAGE E/E' RATIO: 7.04
LEFT ATRIUM VOLUME INDEX: 23.3 ML/M2
LV EF 2D ECHO EST: 65 %
MAXIMAL PREDICTED HEART RATE: 151 BPM
STRESS TARGET HR: 128 BPM

## 2023-01-11 ENCOUNTER — OFFICE VISIT (OUTPATIENT)
Dept: INTERNAL MEDICINE | Facility: CLINIC | Age: 68
End: 2023-01-11
Payer: MEDICARE

## 2023-01-11 VITALS
OXYGEN SATURATION: 100 % | DIASTOLIC BLOOD PRESSURE: 82 MMHG | HEIGHT: 72 IN | WEIGHT: 224 LBS | TEMPERATURE: 96.7 F | SYSTOLIC BLOOD PRESSURE: 134 MMHG | BODY MASS INDEX: 30.34 KG/M2 | HEART RATE: 57 BPM

## 2023-01-11 DIAGNOSIS — I20.8 STABLE ANGINA: Primary | ICD-10-CM

## 2023-01-11 DIAGNOSIS — J84.10 CALCIFIED GRANULOMA OF LUNG: ICD-10-CM

## 2023-01-11 DIAGNOSIS — R06.09 DYSPNEA ON EXERTION: ICD-10-CM

## 2023-01-11 PROCEDURE — 99214 OFFICE O/P EST MOD 30 MIN: CPT | Performed by: NURSE PRACTITIONER

## 2023-01-11 RX ORDER — NITROGLYCERIN 0.3 MG/1
0.3 TABLET SUBLINGUAL
Qty: 10 TABLET | Refills: 12 | Status: SHIPPED | OUTPATIENT
Start: 2023-01-11

## 2023-01-12 PROBLEM — N48.6 INDURATION PENIS PLASTICA: Status: ACTIVE | Noted: 2023-01-12

## 2023-01-12 PROBLEM — N13.8 BENIGN PROSTATIC HYPERPLASIA WITH URINARY OBSTRUCTION: Status: ACTIVE | Noted: 2021-01-12

## 2023-01-12 PROBLEM — R97.20 RAISED PROSTATE SPECIFIC ANTIGEN: Status: ACTIVE | Noted: 2023-01-12

## 2023-01-12 PROBLEM — N52.9 IMPOTENCE OF ORGANIC ORIGIN: Status: ACTIVE | Noted: 2023-01-12

## 2023-01-12 PROBLEM — N41.1 CHRONIC PROSTATITIS: Status: ACTIVE | Noted: 2023-01-12

## 2023-01-12 PROBLEM — R33.9 INCOMPLETE EMPTYING OF BLADDER: Status: ACTIVE | Noted: 2023-01-12

## 2023-01-13 ENCOUNTER — LAB (OUTPATIENT)
Dept: LAB | Facility: HOSPITAL | Age: 68
End: 2023-01-13
Payer: MEDICARE

## 2023-01-13 LAB
25(OH)D3 SERPL-MCNC: 30.2 NG/ML (ref 30–100)
ANION GAP SERPL CALCULATED.3IONS-SCNC: 12.2 MMOL/L (ref 5–15)
BUN SERPL-MCNC: 16 MG/DL (ref 8–23)
BUN/CREAT SERPL: 14 (ref 7–25)
CALCIUM SPEC-SCNC: 9.5 MG/DL (ref 8.6–10.5)
CHLORIDE SERPL-SCNC: 102 MMOL/L (ref 98–107)
CHOLEST SERPL-MCNC: 194 MG/DL (ref 0–200)
CO2 SERPL-SCNC: 25.8 MMOL/L (ref 22–29)
CREAT SERPL-MCNC: 1.14 MG/DL (ref 0.76–1.27)
EGFRCR SERPLBLD CKD-EPI 2021: 69.6 ML/MIN/1.73
GLUCOSE SERPL-MCNC: 95 MG/DL (ref 65–99)
HDLC SERPL-MCNC: 51 MG/DL (ref 40–60)
LDLC SERPL CALC-MCNC: 114 MG/DL (ref 0–100)
LDLC/HDLC SERPL: 2.15 {RATIO}
POTASSIUM SERPL-SCNC: 4.5 MMOL/L (ref 3.5–5.2)
SODIUM SERPL-SCNC: 140 MMOL/L (ref 136–145)
TRIGL SERPL-MCNC: 168 MG/DL (ref 0–150)
TSH SERPL DL<=0.05 MIU/L-ACNC: 2.95 UIU/ML (ref 0.27–4.2)
VLDLC SERPL-MCNC: 29 MG/DL (ref 5–40)

## 2023-01-13 PROCEDURE — 82306 VITAMIN D 25 HYDROXY: CPT | Performed by: INTERNAL MEDICINE

## 2023-01-13 PROCEDURE — 36415 COLL VENOUS BLD VENIPUNCTURE: CPT | Performed by: INTERNAL MEDICINE

## 2023-01-13 PROCEDURE — 84443 ASSAY THYROID STIM HORMONE: CPT | Performed by: INTERNAL MEDICINE

## 2023-01-13 PROCEDURE — 80048 BASIC METABOLIC PNL TOTAL CA: CPT | Performed by: INTERNAL MEDICINE

## 2023-01-13 PROCEDURE — 80061 LIPID PANEL: CPT | Performed by: INTERNAL MEDICINE

## 2023-01-31 ENCOUNTER — HOSPITAL ENCOUNTER (OUTPATIENT)
Dept: CT IMAGING | Facility: HOSPITAL | Age: 68
Discharge: HOME | End: 2023-01-31
Admitting: NURSE PRACTITIONER
Payer: MEDICARE

## 2023-01-31 DIAGNOSIS — R06.09 DYSPNEA ON EXERTION: ICD-10-CM

## 2023-01-31 DIAGNOSIS — R91.1 LEFT LOWER LOBE PULMONARY NODULE: ICD-10-CM

## 2023-01-31 DIAGNOSIS — J84.10 CALCIFIED GRANULOMA OF LUNG: Primary | ICD-10-CM

## 2023-01-31 DIAGNOSIS — J84.10 CALCIFIED GRANULOMA OF LUNG: ICD-10-CM

## 2023-01-31 PROCEDURE — 71260 CT THORAX DX C+: CPT

## 2023-01-31 PROCEDURE — 25010000002 IOPAMIDOL 61 % SOLUTION: Performed by: NURSE PRACTITIONER

## 2023-01-31 RX ADMIN — IOPAMIDOL 100 ML: 612 INJECTION, SOLUTION INTRAVENOUS at 08:03

## 2023-02-07 ENCOUNTER — OFFICE VISIT (OUTPATIENT)
Dept: CARDIOLOGY | Facility: CLINIC | Age: 68
End: 2023-02-07
Payer: MEDICARE

## 2023-02-07 VITALS
OXYGEN SATURATION: 98 % | SYSTOLIC BLOOD PRESSURE: 112 MMHG | HEART RATE: 79 BPM | DIASTOLIC BLOOD PRESSURE: 78 MMHG | BODY MASS INDEX: 29.39 KG/M2 | WEIGHT: 217 LBS | HEIGHT: 72 IN

## 2023-02-07 DIAGNOSIS — I49.1 PAC (PREMATURE ATRIAL CONTRACTION): ICD-10-CM

## 2023-02-07 DIAGNOSIS — R07.2 PRECORDIAL PAIN: Primary | ICD-10-CM

## 2023-02-07 DIAGNOSIS — E78.5 DYSLIPIDEMIA: ICD-10-CM

## 2023-02-07 DIAGNOSIS — I10 PRIMARY HYPERTENSION: ICD-10-CM

## 2023-02-07 PROCEDURE — 99214 OFFICE O/P EST MOD 30 MIN: CPT | Performed by: INTERNAL MEDICINE

## 2023-02-07 RX ORDER — ISOSORBIDE MONONITRATE 30 MG/1
30 TABLET, EXTENDED RELEASE ORAL EVERY MORNING
Qty: 90 TABLET | Refills: 3 | Status: SHIPPED | OUTPATIENT
Start: 2023-02-07 | End: 2023-02-22

## 2023-02-07 RX ORDER — LISINOPRIL AND HYDROCHLOROTHIAZIDE 12.5; 1 MG/1; MG/1
1 TABLET ORAL DAILY
Qty: 90 TABLET | Refills: 4 | Status: SHIPPED | OUTPATIENT
Start: 2023-02-07

## 2023-02-07 NOTE — PROGRESS NOTES
Subjective:     Encounter Date:02/07/2023      Patient ID: Carmelo Arnold is a 67 y.o. male.    Chief Complaint:Chest discomfort  HPI  This is a 67-year-old male patient who recently underwent a battery of outpatient testing to evaluate multiple chest complaints including chest discomfort, dyspnea and palpitations. The patient had a normal echocardiogram. The echocardiogram showed no evidence of ventricular hypertrophy or cardiac chamber enlargement.  Left ventricular systolic and diastolic function was normal.  There was no evidence of valvular pathology of significance, pericardial disease, pulmonary hypertension or intracardiac shunting.  The left ventricular ejection fraction was normal and there were no regional wall motion abnormalities.He also wore a outpatient cardiac monitor showing no evidence of arrhythmia or frequent/Complex atrial or ventricular ectopy.  The patient also underwent a standard treadmill exercise stress test.  He had noneffort limiting symptoms during the testing protocol with chest discomfort and shortness of breath but no associated ischemic EKG changes.  Heart rate and blood pressure response to exercise was normal and there was no exercise-induced arrhythmia.  Since testing the patient has indicated that his symptoms have failed to improve.  He reports having a pressure or tightness diffusely across his central chest which tends to radiate into his left arm.  The chest discomfort is associated with shortness of breath but no nausea vomiting or diaphoresis.  It typically occurs with exertional activities and is relieved with rest and/or sublingual nitroglycerin, recently provided by his primary care provider.  The overall frequency duration and intensity of his symptoms have escalated somewhat compared to his initial visit.  The patient has noted that the amount of exertional activity needed to precipitate his symptoms has lessened. He reports compliance with his medications.  He remains  a non-smoker.The patient underwent a CT scan of the chest which demonstrated multiple calcified and noncalcified nodules throughout both lung fields.    The following portions of the patient's history were reviewed and updated as appropriate: allergies, current medications, past family history, past medical history, past social history, past surgical history and problem  Review of Systems   Constitutional: Negative for chills, diaphoresis, fever, malaise/fatigue, weight gain and weight loss.   HENT: Negative for ear discharge, hearing loss, hoarse voice and nosebleeds.    Eyes: Negative for discharge, double vision, pain and photophobia.   Cardiovascular: Positive for chest pain and dyspnea on exertion. Negative for claudication, cyanosis, irregular heartbeat, leg swelling, near-syncope, orthopnea, palpitations, paroxysmal nocturnal dyspnea and syncope.   Respiratory: Negative for cough, hemoptysis, shortness of breath, sputum production and wheezing.    Endocrine: Negative for cold intolerance, heat intolerance, polydipsia, polyphagia and polyuria.   Hematologic/Lymphatic: Negative for adenopathy and bleeding problem. Does not bruise/bleed easily.   Skin: Negative for color change, flushing, itching and rash.   Musculoskeletal: Negative for muscle cramps, muscle weakness, myalgias and stiffness.   Gastrointestinal: Negative for abdominal pain, diarrhea, hematemesis, hematochezia, nausea and vomiting.   Genitourinary: Negative for dysuria, frequency and nocturia.   Neurological: Negative for focal weakness, loss of balance, numbness, paresthesias and seizures.   Psychiatric/Behavioral: Negative for altered mental status, hallucinations and suicidal ideas.   Allergic/Immunologic: Negative for HIV exposure, hives and persistent infections.           Current Outpatient Medications:   •  Aspirin 81 MG capsule, Take  by mouth., Disp: , Rfl:   •  levothyroxine (SYNTHROID, LEVOTHROID) 88 MCG tablet, 1 daily po, Disp: 90  "tablet, Rfl: 3  •  lisinopril-hydrochlorothiazide (Zestoretic) 10-12.5 MG per tablet, Take 1 tablet by mouth Daily., Disp: 30 tablet, Rfl: 1  •  loratadine (CLARITIN) 10 MG tablet, , Disp: , Rfl:   •  metoprolol succinate XL (TOPROL-XL) 25 MG 24 hr tablet, Take 1 tablet by mouth Daily., Disp: 90 tablet, Rfl: 3  •  nitroglycerin (Nitrostat) 0.3 MG SL tablet, Place 1 tablet under the tongue Every 5 (Five) Minutes As Needed for Chest Pain. Take no more than 3 doses in 15 minutes., Disp: 10 tablet, Rfl: 12  •  rosuvastatin (CRESTOR) 10 MG tablet, Take 1 tablet by mouth Daily., Disp: 90 tablet, Rfl: 3  •  vitamin B-12 (CYANOCOBALAMIN) 1000 MCG tablet, Take 1,000 mcg by mouth Daily., Disp: , Rfl:     Objective:   Vitals and nursing note reviewed.   Constitutional:       Appearance: Healthy appearance. Not in distress.   Neck:      Thyroid: No thyromegaly.      Vascular: No JVR. JVD normal.      Lymphadenopathy: No cervical adenopathy.   Pulmonary:      Effort: Pulmonary effort is normal.      Breath sounds: Normal breath sounds. No wheezing. No rhonchi. No rales.   Chest:      Chest wall: Not tender to palpatation.   Cardiovascular:      PMI at left midclavicular line. Normal rate. Regular rhythm. Normal S1. Normal S2.      Murmurs: There is no murmur.      No gallop. No click. No rub.   Pulses:     Intact distal pulses.   Edema:     Peripheral edema absent.   Abdominal:      General: Bowel sounds are normal.      Palpations: Abdomen is soft.      Tenderness: There is no abdominal tenderness.   Musculoskeletal: Normal range of motion.         General: No tenderness. Skin:     General: Skin is warm and dry.   Neurological:      General: No focal deficit present.      Mental Status: Alert and oriented to person, place and time.       Blood pressure 112/78, pulse 79, height 182.9 cm (72\"), weight 98.4 kg (217 lb), SpO2 98 %.   Lab Review:     Assessment:       1. PAC (premature atrial contraction)  No correlation with " symptoms on recent cardiac monitor.  Overall PAC burden is low.    2. Precordial pain  The patient's symptoms are fairly typical for angina pectoris.  Recent treadmill exercise stress testing was somewhat equivocal in that the patient had noneffort limiting chest discomfort without significant ischemic EKG changes.  Further investigation is required.    3. Dyslipidemia  Tolerating statin based cholesterol-lowering therapy without side effects.    4. Primary hypertension  Acceptable blood pressure control.    Procedures    Plan:     Advance Care Planning   ACP discussion was held with the patient during this visit. Patient has an advance directive (not in EMR), copy requested.       I have recommended a coronary CT angiogram with contrast.    I have recommended starting Imdur 30 mg orally every morning.    Further recommendations will be predicated on the results of his outpatient testing. The patient is instructed to follow-up with his primary care provider to address the abnormal findings on CT scan of the chest involving both lung fields with possible need for referral to pulmonary specialist.

## 2023-02-08 RX ORDER — LISINOPRIL AND HYDROCHLOROTHIAZIDE 12.5; 1 MG/1; MG/1
1 TABLET ORAL DAILY
Qty: 90 TABLET | Refills: 4 | OUTPATIENT
Start: 2023-02-08

## 2023-02-08 NOTE — TELEPHONE ENCOUNTER
Rx Refill Note  Requested Prescriptions     Pending Prescriptions Disp Refills   • lisinopril-hydrochlorothiazide (PRINZIDE,ZESTORETIC) 10-12.5 MG per tablet [Pharmacy Med Name: LISINOPRIL-HCTZ 10/12.5MG TABLETS] 90 tablet 4     Sig: TAKE 1 TABLET BY MOUTH DAILY      Last office visit with prescribing clinician: 1/11/23 Lino  Last telemedicine visit with prescribing clinician:   Next office visit with prescribing clinician: 2/10/23 (Lino)       Estephania Lewis MA  02/08/23, 14:56 EST

## 2023-02-10 ENCOUNTER — OFFICE VISIT (OUTPATIENT)
Dept: INTERNAL MEDICINE | Facility: CLINIC | Age: 68
End: 2023-02-10
Payer: MEDICARE

## 2023-02-10 VITALS
BODY MASS INDEX: 29.41 KG/M2 | TEMPERATURE: 97.4 F | HEART RATE: 51 BPM | DIASTOLIC BLOOD PRESSURE: 80 MMHG | OXYGEN SATURATION: 100 % | SYSTOLIC BLOOD PRESSURE: 126 MMHG | HEIGHT: 72 IN | WEIGHT: 217.12 LBS

## 2023-02-10 DIAGNOSIS — R91.1 LEFT LOWER LOBE PULMONARY NODULE: ICD-10-CM

## 2023-02-10 DIAGNOSIS — R07.2 PRECORDIAL PAIN: Primary | ICD-10-CM

## 2023-02-10 PROCEDURE — 99213 OFFICE O/P EST LOW 20 MIN: CPT | Performed by: NURSE PRACTITIONER

## 2023-02-10 NOTE — PROGRESS NOTES
"  Office Visit      Patient Name: Carmelo Arnold  : 1955   MRN: [id number]   Care Team: Patient Care Team:  Salas Meléndez MD as PCP - General (Internal Medicine)  Negrita Herrera MD as Consulting Physician (General Surgery)  Negrita Herrera MD as Consulting Physician (General Surgery)    Chief Complaint  Chest Pain    Subjective     Subjective      Carmelo Arnold presents to [facility] for follow-up of chest pain.  Saw Dr. Mcqueen and cardiac CT was ordered, scheduled for April.  He was also started on Imdur, has currently been taking for about 3 days so has not really seen a big difference in symptoms.  He continues to have exertional chest pain that is intermittent, this is relieved by nitroglycerin.  He continues to have no other exacerbating factors including GI symptoms or musculoskeletal problems.  He did get a neck and shoulder massage twice which has not changed the symptoms.  A CT of the chest was obtained last visit due to granuloma found on x-ray imaging incidentally.  He does have scattered granuloma throughout the lungs, largest being 6 mm in the left lower lobe.  Radiology recommended following up in 6 months.  He has never been a smoker, no history of hospitalization due to pneumonia, and no known pulmonary disease that he is aware of.  He does have shortness of breath at times but not anything to make him concerned.  He has never seen a pulmonologist in the past or been diagnosed with asthma.  He has lived here in [place] most of his life, did spend about 10 years in [place].    Objective     Objective   Vital Signs:   /80   Pulse 51   Temp 97.4 °F (36.3 °C)   Ht 182.9 cm (72\")   Wt 98.5 kg (217 lb 1.9 oz)   SpO2 100%   BMI 29.45 kg/m²     Physical Exam  Vitals and nursing note reviewed.   Constitutional:       General: He is not in acute distress.     Appearance: Normal appearance. He is not toxic-appearing.   Eyes:      Pupils: Pupils are equal, round, " and reactive to light.   Neck:      Vascular: No carotid bruit.   Cardiovascular:      Rate and Rhythm: Normal rate and regular rhythm.      Heart sounds: Normal heart sounds. No murmur heard.  Pulmonary:      Effort: Pulmonary effort is normal. No respiratory distress.      Breath sounds: Normal breath sounds. No wheezing.   Abdominal:      General: Bowel sounds are normal. There is no distension.      Palpations: Abdomen is soft.      Tenderness: There is no abdominal tenderness.   Musculoskeletal:         General: Normal range of motion.      Cervical back: Neck supple. No tenderness.   Skin:     General: Skin is warm and dry.      Findings: No rash.   Neurological:      General: No focal deficit present.      Mental Status: He is alert.   Psychiatric:         Mood and Affect: Mood normal.         Behavior: Behavior normal.          Assessment / Plan      Assessment & Plan   Problem List Items Addressed This Visit        Cardiac and Vasculature    Precordial pain - Primary    I again have encouraged him to continue following up with cardiology as chest pain does seem to be from a cardiac etiology rather than GI or musculoskeletal.  Continue Imdur and rest with chest pain.  ER with any chest pain unrelieved by rest or nitroglycerin.  He does have some pulmonary findings on CT of the chest none that are large or with concerning features for urgent referral to pulmonary, we will keep CT of the chest follow-up in 6 months on 6 mm granuloma in the left lung.  If cardiac testing is all negative would further his work-up with rheumatologic studies, Lyme, and inflammatory markers.  He will follow-up with PCP after cardiology.   Other Visit Diagnoses     Left lower lobe pulmonary nodule        See above plan.         Follow Up   Return for Next scheduled follow up.  Patient was given instructions and counseling regarding his condition or for health maintenance advice. Please see specific information pulled into the AVS if  appropriate.     SARAH Cuadra  [facility]    Answers for HPI/ROS submitted by the patient on 2/3/2023  What is the primary reason for your visit?: Other  Please describe your symptoms.: follow up  Have you had these symptoms before?: Yes  How long have you been having these symptoms?: Greater than 2 weeks

## 2023-02-20 ENCOUNTER — TELEPHONE (OUTPATIENT)
Dept: CARDIOLOGY | Facility: CLINIC | Age: 68
End: 2023-02-20
Payer: MEDICARE

## 2023-02-21 NOTE — TELEPHONE ENCOUNTER
"Spoke with patient. Offered him an appointment with Dr. Mcqueen for this Thursday. Patient states his CT scan is on Friday and he would rather been seen post testing. Informed patient that Dr. Mcqueen doesn't have any other appointments other than his already scheduled follow up in April. Patient states he would like to have the testing done and just go from there. States he had an \"episode\" this morning of chest pain. States it lasted for around 15 minutes-longer than normal. States he was getting ready to head to the ER and the pain subsided. Informed patient that it is okay to stop the IMDUR and he can take the nitro just as needed in place. Patient verbalized understanding.   "

## 2023-02-21 NOTE — TELEPHONE ENCOUNTER
Is it possible for the patient to see Dr. Mcqueen this week?  He can hold this medication until he sees MD and go back to taking PRN nitroglycerin if this works for him.

## 2023-02-22 ENCOUNTER — OFFICE VISIT (OUTPATIENT)
Dept: INTERNAL MEDICINE | Facility: CLINIC | Age: 68
End: 2023-02-22
Payer: MEDICARE

## 2023-02-22 ENCOUNTER — TELEPHONE (OUTPATIENT)
Dept: INTERNAL MEDICINE | Facility: CLINIC | Age: 68
End: 2023-02-22
Payer: MEDICARE

## 2023-02-22 VITALS
BODY MASS INDEX: 29.14 KG/M2 | SYSTOLIC BLOOD PRESSURE: 122 MMHG | WEIGHT: 215.12 LBS | HEART RATE: 50 BPM | DIASTOLIC BLOOD PRESSURE: 82 MMHG | TEMPERATURE: 96.8 F | HEIGHT: 72 IN | OXYGEN SATURATION: 100 %

## 2023-02-22 DIAGNOSIS — H91.8X3 OTHER SPECIFIED HEARING LOSS OF BOTH EARS: Primary | ICD-10-CM

## 2023-02-22 PROCEDURE — 99212 OFFICE O/P EST SF 10 MIN: CPT | Performed by: NURSE PRACTITIONER

## 2023-02-22 NOTE — TELEPHONE ENCOUNTER
PT IS REQUESTING A REFERRAL TO [facility] FAX NUMBER:[phone] PHONE NUMBER:[phone].  IF ANY QUESTIONS PLEASE CONTACT PT [phone]

## 2023-02-22 NOTE — PROGRESS NOTES
"  Office Visit      Patient Name: Carmelo Arnold  : 1955   MRN: [id number]   Care Team: Patient Care Team:  Salas Meléndez MD as PCP - General (Internal Medicine)  Negrita Herrera MD as Consulting Physician (General Surgery)  Negrita Herrera MD as Consulting Physician (General Surgery)    Chief Complaint  Hearing Problem (Needs a new referral for hearing test)    Subjective     Subjective      Carmelo Arnold presents to [facility] for hearing issue.   Has had hearing aids since January last year. Went in for a 1 year follow-up and was told he needed a new referral.  He saw this clinic last year and feels like hearing aids are not working as well.   He also endorses some pressure behind both ears. He uses steroid nose spray as needed and does help.   He continues to have trouble hearing softer tones.   No acute problems today.     Objective     Objective   Vital Signs:   /82   Pulse 50   Temp 96.8 °F (36 °C)   Ht 182.9 cm (72\")   Wt 97.6 kg (215 lb 1.9 oz)   SpO2 100%   BMI 29.18 kg/m²     Physical Exam  Vitals and nursing note reviewed.   Constitutional:       Appearance: Normal appearance. He is not ill-appearing.   HENT:      Right Ear: Ear canal normal. Tympanic membrane is scarred.      Left Ear: Ear canal normal. Tympanic membrane is scarred.   Eyes:      Pupils: Pupils are equal, round, and reactive to light.   Cardiovascular:      Rate and Rhythm: Regular rhythm. Bradycardia present.      Heart sounds: Normal heart sounds. No murmur heard.  Musculoskeletal:      Cervical back: Neck supple. No tenderness.   Skin:     General: Skin is warm.      Findings: No rash.   Neurological:      General: No focal deficit present.      Mental Status: He is alert.   Psychiatric:         Mood and Affect: Mood normal.         Behavior: Behavior normal.       Assessment / Plan      Assessment & Plan   Problem List Items Addressed This Visit    None  Visit Diagnoses     Other specified " hearing loss of both ears    -  Primary    Relevant Orders    Ambulatory Referral to Audiology (Completed)    Referral placed to audiology for repeat testing due to worsening. Continue fluticasone nasal spray as needed. Follow-up here as needed.         Follow Up   Return if symptoms worsen or fail to improve.  Patient was given instructions and counseling regarding his condition or for health maintenance advice. Please see specific information pulled into the AVS if appropriate.     SARAH Cuadra  [facility] [place]      Answers for HPI/ROS submitted by the patient on 2/22/2023  What is the primary reason for your visit?: Other  Please describe your symptoms.: hearing test referal  Have you had these symptoms before?: Yes  How long have you been having these symptoms?: Greater than 2 weeks

## 2023-02-24 ENCOUNTER — HOSPITAL ENCOUNTER (OUTPATIENT)
Dept: CT IMAGING | Facility: HOSPITAL | Age: 68
Discharge: HOME | End: 2023-02-24
Admitting: STUDENT IN AN ORGANIZED HEALTH CARE EDUCATION/TRAINING PROGRAM
Payer: MEDICARE

## 2023-02-24 VITALS
OXYGEN SATURATION: 100 % | BODY MASS INDEX: 27.98 KG/M2 | HEART RATE: 50 BPM | RESPIRATION RATE: 18 BRPM | DIASTOLIC BLOOD PRESSURE: 57 MMHG | SYSTOLIC BLOOD PRESSURE: 128 MMHG | TEMPERATURE: 97.5 F | HEIGHT: 74 IN | WEIGHT: 218 LBS

## 2023-02-24 PROCEDURE — 63710000001 NITROGLYCERIN 0.4 MG SUBLINGUAL TABLET

## 2023-02-24 PROCEDURE — 75572 CT HRT W/3D IMAGE: CPT

## 2023-02-24 PROCEDURE — A9270 NON-COVERED ITEM OR SERVICE: HCPCS

## 2023-02-24 PROCEDURE — 25510000001 IOPAMIDOL PER 1 ML: Performed by: INTERNAL MEDICINE

## 2023-02-24 RX ORDER — SODIUM CHLORIDE 0.9 % (FLUSH) 0.9 %
10 SYRINGE (ML) INJECTION AS NEEDED
Status: DISCONTINUED | OUTPATIENT
Start: 2023-02-24 | End: 2023-02-25 | Stop reason: HOSPADM

## 2023-02-24 RX ORDER — SODIUM CHLORIDE 0.9 % (FLUSH) 0.9 %
3 SYRINGE (ML) INJECTION EVERY 12 HOURS SCHEDULED
Status: DISCONTINUED | OUTPATIENT
Start: 2023-02-24 | End: 2023-02-25 | Stop reason: HOSPADM

## 2023-02-24 RX ORDER — LIDOCAINE HYDROCHLORIDE 10 MG/ML
5 INJECTION, SOLUTION EPIDURAL; INFILTRATION; INTRACAUDAL; PERINEURAL AS NEEDED
Status: DISCONTINUED | OUTPATIENT
Start: 2023-02-24 | End: 2023-02-25 | Stop reason: HOSPADM

## 2023-02-24 RX ORDER — NITROGLYCERIN 0.4 MG/1
0.4 TABLET SUBLINGUAL
Status: COMPLETED | OUTPATIENT
Start: 2023-02-24 | End: 2023-02-24

## 2023-02-24 RX ORDER — METOPROLOL TARTRATE 50 MG/1
100 TABLET, FILM COATED ORAL ONCE AS NEEDED
Status: DISCONTINUED | OUTPATIENT
Start: 2023-02-24 | End: 2023-02-25 | Stop reason: HOSPADM

## 2023-02-24 RX ORDER — NITROGLYCERIN 0.4 MG/1
TABLET SUBLINGUAL
Status: COMPLETED
Start: 2023-02-24 | End: 2023-02-24

## 2023-02-24 RX ORDER — METOPROLOL TARTRATE 50 MG/1
50 TABLET, FILM COATED ORAL ONCE AS NEEDED
Status: DISCONTINUED | OUTPATIENT
Start: 2023-02-24 | End: 2023-02-25 | Stop reason: HOSPADM

## 2023-02-24 RX ADMIN — NITROGLYCERIN 0.4 MG: 0.4 TABLET SUBLINGUAL at 12:50

## 2023-02-24 RX ADMIN — IOPAMIDOL 65 ML: 755 INJECTION, SOLUTION INTRAVENOUS at 13:04

## 2023-02-27 ENCOUNTER — TELEPHONE (OUTPATIENT)
Dept: CARDIOLOGY | Facility: CLINIC | Age: 68
End: 2023-02-27
Payer: MEDICARE

## 2023-02-27 NOTE — TELEPHONE ENCOUNTER
FYI      Per Dr. Rivera, CTA highly abnormal.showed high grade proximal LAD Stenosis. Dr. Rivera just wanted to make sure you were aware.

## 2023-03-08 ENCOUNTER — TELEPHONE (OUTPATIENT)
Dept: CARDIOLOGY | Facility: CLINIC | Age: 68
End: 2023-03-08
Payer: MEDICARE

## 2023-03-09 NOTE — TELEPHONE ENCOUNTER
Please advise the patient that his CT scan did show some coronary artery disease.  Dr. Mcqueen will discuss this with him at his follow-up appointment.  If it is possible to offer the patient a sooner appointment with Dr. Mcqueen?  Feel free to reach out to Vic or Mana with scheduling help.

## 2023-03-09 NOTE — TELEPHONE ENCOUNTER
Spoke with patient. Information was given and understood. Patient has been scheduled for a sooner appointment for 3/20/23 at 9:45. Patient verbally confirmed.

## 2023-03-14 DIAGNOSIS — E03.9 HYPOTHYROIDISM, UNSPECIFIED TYPE: ICD-10-CM

## 2023-03-14 RX ORDER — METOPROLOL SUCCINATE 25 MG/1
TABLET, EXTENDED RELEASE ORAL
Qty: 90 TABLET | Refills: 3 | Status: SHIPPED | OUTPATIENT
Start: 2023-03-14

## 2023-03-14 RX ORDER — LEVOTHYROXINE SODIUM 88 UG/1
TABLET ORAL
Qty: 90 TABLET | Refills: 3 | Status: SHIPPED | OUTPATIENT
Start: 2023-03-14

## 2023-03-14 NOTE — TELEPHONE ENCOUNTER
Rx Refill Note  Requested Prescriptions     Pending Prescriptions Disp Refills   • metoprolol succinate XL (TOPROL-XL) 25 MG 24 hr tablet [Pharmacy Med Name: METOPROLOL ER SUCCINATE 25MG TABS] 90 tablet 3     Sig: TAKE 1 TABLET BY MOUTH EVERY DAY   • levothyroxine (SYNTHROID, LEVOTHROID) 88 MCG tablet [Pharmacy Med Name: LEVOTHYROXINE 0.088MG (88MCG) TAB] 90 tablet 3     Sig: TAKE 1 TABLET BY MOUTH EVERY DAY      Last office visit with prescribing clinician: 12/7/2022     Next office visit with prescribing clinician: 04/18/2023 with extender                      Would you like a call back once the refill request has been completed: [] Yes [] No    If the office needs to give you a call back, can they leave a voicemail: [] Yes [] No    Noni Hwang MA  03/14/23, 08:07 EDT

## 2023-03-20 ENCOUNTER — OFFICE VISIT (OUTPATIENT)
Dept: CARDIOLOGY | Facility: CLINIC | Age: 68
End: 2023-03-20
Payer: MEDICARE

## 2023-03-20 VITALS
OXYGEN SATURATION: 98 % | HEART RATE: 53 BPM | WEIGHT: 216 LBS | HEIGHT: 74 IN | SYSTOLIC BLOOD PRESSURE: 132 MMHG | BODY MASS INDEX: 27.72 KG/M2 | DIASTOLIC BLOOD PRESSURE: 84 MMHG

## 2023-03-20 DIAGNOSIS — I20.2 REFRACTORY ANGINA PECTORIS: Primary | ICD-10-CM

## 2023-03-20 DIAGNOSIS — I10 PRIMARY HYPERTENSION: ICD-10-CM

## 2023-03-20 DIAGNOSIS — E78.5 DYSLIPIDEMIA: ICD-10-CM

## 2023-03-20 DIAGNOSIS — I25.118 CORONARY ARTERY DISEASE INVOLVING NATIVE CORONARY ARTERY OF NATIVE HEART WITH REFRACTORY ANGINA PECTORIS: ICD-10-CM

## 2023-03-20 PROBLEM — I25.112 CORONARY ARTERY DISEASE INVOLVING NATIVE CORONARY ARTERY OF NATIVE HEART WITH REFRACTORY ANGINA PECTORIS: Status: ACTIVE | Noted: 2023-03-20

## 2023-03-20 PROCEDURE — 3075F SYST BP GE 130 - 139MM HG: CPT | Performed by: INTERNAL MEDICINE

## 2023-03-20 PROCEDURE — 3079F DIAST BP 80-89 MM HG: CPT | Performed by: INTERNAL MEDICINE

## 2023-03-20 PROCEDURE — 1159F MED LIST DOCD IN RCRD: CPT | Performed by: INTERNAL MEDICINE

## 2023-03-20 PROCEDURE — 99214 OFFICE O/P EST MOD 30 MIN: CPT | Performed by: INTERNAL MEDICINE

## 2023-03-20 PROCEDURE — 1160F RVW MEDS BY RX/DR IN RCRD: CPT | Performed by: INTERNAL MEDICINE

## 2023-03-20 RX ORDER — HYDROCODONE BITARTRATE AND ACETAMINOPHEN 5; 325 MG/1; MG/1
1 TABLET ORAL EVERY 4 HOURS PRN
Status: CANCELLED | OUTPATIENT
Start: 2023-03-20 | End: 2023-03-30

## 2023-03-20 RX ORDER — ONDANSETRON 2 MG/ML
4 INJECTION INTRAMUSCULAR; INTRAVENOUS EVERY 6 HOURS PRN
Status: CANCELLED | OUTPATIENT
Start: 2023-03-20

## 2023-03-20 RX ORDER — DIAZEPAM 2 MG/1
4 TABLET ORAL ONCE
Status: CANCELLED | OUTPATIENT
Start: 2023-03-20 | End: 2023-03-20

## 2023-03-20 NOTE — PROGRESS NOTES
Subjective:     Encounter Date:03/20/2023      Patient ID: Carmelo Arnold is a 67 y.o. male.    Chief Complaint: Coronary artery disease  HPI  This is a 67-year-old male patient who recently underwent a noninvasive coronary CT angiogram demonstrating evidence of severe proximal LAD disease.  He has continued to have lifestyle limiting angina despite multiple antianginal therapies.  He reports compliance with his medications with no perceived side effects.  He is a non-smoker.  The following portions of the patient's history were reviewed and updated as appropriate: allergies, current medications, past family history, past medical history, past social history, past surgical history and problem  Review of Systems   Constitutional: Negative for chills, diaphoresis, fever, malaise/fatigue, weight gain and weight loss.   HENT: Negative for ear discharge, hearing loss, hoarse voice and nosebleeds.    Eyes: Negative for discharge, double vision, pain and photophobia.   Cardiovascular: Positive for chest pain. Negative for claudication, cyanosis, dyspnea on exertion, irregular heartbeat, leg swelling, near-syncope, orthopnea, palpitations, paroxysmal nocturnal dyspnea and syncope.   Respiratory: Negative for cough, hemoptysis, shortness of breath, sputum production and wheezing.    Endocrine: Negative for cold intolerance, heat intolerance, polydipsia, polyphagia and polyuria.   Hematologic/Lymphatic: Negative for adenopathy and bleeding problem. Does not bruise/bleed easily.   Skin: Negative for color change, flushing, itching and rash.   Musculoskeletal: Negative for muscle cramps, muscle weakness, myalgias and stiffness.   Gastrointestinal: Negative for abdominal pain, diarrhea, hematemesis, hematochezia, nausea and vomiting.   Genitourinary: Negative for dysuria, frequency and nocturia.   Neurological: Negative for focal weakness, loss of balance, numbness, paresthesias and seizures.   Psychiatric/Behavioral:  Negative for altered mental status, hallucinations and suicidal ideas.   Allergic/Immunologic: Negative for HIV exposure, hives and persistent infections.           Current Outpatient Medications:   •  Aspirin 81 MG capsule, Take  by mouth., Disp: , Rfl:   •  levothyroxine (SYNTHROID, LEVOTHROID) 88 MCG tablet, TAKE 1 TABLET BY MOUTH EVERY DAY, Disp: 90 tablet, Rfl: 3  •  lisinopril-hydrochlorothiazide (Zestoretic) 10-12.5 MG per tablet, Take 1 tablet by mouth Daily., Disp: 90 tablet, Rfl: 4  •  loratadine (CLARITIN) 10 MG tablet, , Disp: , Rfl:   •  metoprolol succinate XL (TOPROL-XL) 25 MG 24 hr tablet, TAKE 1 TABLET BY MOUTH EVERY DAY, Disp: 90 tablet, Rfl: 3  •  nitroglycerin (Nitrostat) 0.3 MG SL tablet, Place 1 tablet under the tongue Every 5 (Five) Minutes As Needed for Chest Pain. Take no more than 3 doses in 15 minutes., Disp: 10 tablet, Rfl: 12  •  rosuvastatin (CRESTOR) 10 MG tablet, Take 1 tablet by mouth Daily., Disp: 90 tablet, Rfl: 3  •  vitamin B-12 (CYANOCOBALAMIN) 1000 MCG tablet, Take 1 tablet by mouth Daily., Disp: , Rfl:     Objective:   Vitals and nursing note reviewed.   Constitutional:       Appearance: Healthy appearance. Not in distress.   Neck:      Vascular: No JVR. JVD normal.   Pulmonary:      Effort: Pulmonary effort is normal.      Breath sounds: Normal breath sounds. No wheezing. No rhonchi. No rales.   Chest:      Chest wall: Not tender to palpatation.   Cardiovascular:      PMI at left midclavicular line. Normal rate. Regular rhythm. Normal S1. Normal S2.      Murmurs: There is no murmur.      No gallop. No click. No rub.   Pulses:     Intact distal pulses.   Edema:     Peripheral edema absent.   Abdominal:      General: Bowel sounds are normal.      Palpations: Abdomen is soft.      Tenderness: There is no abdominal tenderness.   Musculoskeletal: Normal range of motion.         General: No tenderness. Skin:     General: Skin is warm and dry.   Neurological:      General: No focal  "deficit present.      Mental Status: Alert and oriented to person, place and time.       Blood pressure 132/84, pulse 53, height 188 cm (74\"), weight 98 kg (216 lb), SpO2 98 %.   Lab Review:     Assessment:       1. Refractory angina pectoris      2. Coronary artery disease involving native coronary artery of native heart with refractory angina pectoris (HCC)  Coronary CT angiogram demonstrates concern for a severe proximal LAD stenosis.    3. Dyslipidemia  Tolerating statin based cholesterol-lowering therapy without side effects.    4. Primary hypertension  Acceptable blood pressure control.    Procedures    Plan:     Advance Care Planning   ACP discussion was held with the patient during this visit. Patient has an advance directive (not in EMR), copy requested.     I have recommended invasive coronary angiography with interventional standby.  Mr. Arnold has been engaged in a patient level discussion explaining the rationale for proceeding with invasive testing.  The procedure of coronary angiography with catheter-based coronary revascularization has been explained in detail, using layman's terminology, including risks benefits and alternatives.  He expresses understanding and desire to proceed.  Further recommendations will be predicated on the results of his catheterization findings.      "

## 2023-03-28 ENCOUNTER — HOSPITAL ENCOUNTER (OUTPATIENT)
Facility: HOSPITAL | Age: 68
Setting detail: HOSPITAL OUTPATIENT SURGERY
Discharge: HOME | End: 2023-03-28
Attending: INTERNAL MEDICINE | Admitting: INTERNAL MEDICINE
Payer: MEDICARE

## 2023-03-28 VITALS
HEART RATE: 65 BPM | HEIGHT: 74 IN | OXYGEN SATURATION: 98 % | SYSTOLIC BLOOD PRESSURE: 131 MMHG | WEIGHT: 210 LBS | RESPIRATION RATE: 19 BRPM | TEMPERATURE: 97.7 F | BODY MASS INDEX: 26.95 KG/M2 | DIASTOLIC BLOOD PRESSURE: 83 MMHG

## 2023-03-28 DIAGNOSIS — R07.2 PRECORDIAL PAIN: Primary | ICD-10-CM

## 2023-03-28 DIAGNOSIS — I20.2 REFRACTORY ANGINA PECTORIS: ICD-10-CM

## 2023-03-28 DIAGNOSIS — I25.118 CORONARY ARTERY DISEASE INVOLVING NATIVE CORONARY ARTERY OF NATIVE HEART WITH OTHER FORM OF ANGINA PECTORIS: ICD-10-CM

## 2023-03-28 LAB
ACT BLD: 258 SECONDS (ref 82–152)
ANION GAP SERPL CALCULATED.3IONS-SCNC: 13.3 MMOL/L (ref 5–15)
BUN SERPL-MCNC: 21 MG/DL (ref 8–23)
BUN/CREAT SERPL: 17.5 (ref 7–25)
CALCIUM SPEC-SCNC: 9 MG/DL (ref 8.6–10.5)
CHLORIDE SERPL-SCNC: 99 MMOL/L (ref 98–107)
CO2 SERPL-SCNC: 25.7 MMOL/L (ref 22–29)
CREAT SERPL-MCNC: 1.2 MG/DL (ref 0.76–1.27)
DEPRECATED RDW RBC AUTO: 42.5 FL (ref 37–54)
EGFRCR SERPLBLD CKD-EPI 2021: 65.5 ML/MIN/1.73
ERYTHROCYTE [DISTWIDTH] IN BLOOD BY AUTOMATED COUNT: 13.5 % (ref 12.3–15.4)
GLUCOSE SERPL-MCNC: 99 MG/DL (ref 65–99)
HCT VFR BLD AUTO: 43 % (ref 37.5–51)
HGB BLD-MCNC: 14.4 G/DL (ref 13–17.7)
MCH RBC QN AUTO: 28.9 PG (ref 26.6–33)
MCHC RBC AUTO-ENTMCNC: 33.5 G/DL (ref 31.5–35.7)
MCV RBC AUTO: 86.2 FL (ref 79–97)
PLATELET # BLD AUTO: 202 10*3/MM3 (ref 140–450)
PMV BLD AUTO: 10.4 FL (ref 6–12)
POTASSIUM SERPL-SCNC: 4.2 MMOL/L (ref 3.5–5.2)
RBC # BLD AUTO: 4.99 10*6/MM3 (ref 4.14–5.8)
SODIUM SERPL-SCNC: 138 MMOL/L (ref 136–145)
WBC NRBC COR # BLD: 6.72 10*3/MM3 (ref 3.4–10.8)

## 2023-03-28 PROCEDURE — 25010000002 HEPARIN (PORCINE) PER 1000 UNITS: Performed by: INTERNAL MEDICINE

## 2023-03-28 PROCEDURE — C1769 GUIDE WIRE: HCPCS | Performed by: INTERNAL MEDICINE

## 2023-03-28 PROCEDURE — 25010000002 FENTANYL CITRATE (PF) 50 MCG/ML SOLUTION: Performed by: INTERNAL MEDICINE

## 2023-03-28 PROCEDURE — 0 LIDOCAINE 1 % SOLUTION: Performed by: INTERNAL MEDICINE

## 2023-03-28 PROCEDURE — 25510000001 IOPAMIDOL PER 1 ML: Performed by: INTERNAL MEDICINE

## 2023-03-28 PROCEDURE — 85347 COAGULATION TIME ACTIVATED: CPT

## 2023-03-28 PROCEDURE — 80048 BASIC METABOLIC PNL TOTAL CA: CPT | Performed by: INTERNAL MEDICINE

## 2023-03-28 PROCEDURE — C1887 CATHETER, GUIDING: HCPCS | Performed by: INTERNAL MEDICINE

## 2023-03-28 PROCEDURE — C1894 INTRO/SHEATH, NON-LASER: HCPCS | Performed by: INTERNAL MEDICINE

## 2023-03-28 PROCEDURE — 25010000002 MIDAZOLAM PER 1MG: Performed by: INTERNAL MEDICINE

## 2023-03-28 PROCEDURE — 85027 COMPLETE CBC AUTOMATED: CPT | Performed by: INTERNAL MEDICINE

## 2023-03-28 PROCEDURE — 93454 CORONARY ARTERY ANGIO S&I: CPT | Performed by: INTERNAL MEDICINE

## 2023-03-28 RX ORDER — VERAPAMIL HYDROCHLORIDE 2.5 MG/ML
INJECTION, SOLUTION INTRAVENOUS
Status: DISCONTINUED | OUTPATIENT
Start: 2023-03-28 | End: 2023-03-28 | Stop reason: HOSPADM

## 2023-03-28 RX ORDER — ONDANSETRON 2 MG/ML
4 INJECTION INTRAMUSCULAR; INTRAVENOUS EVERY 6 HOURS PRN
Status: DISCONTINUED | OUTPATIENT
Start: 2023-03-28 | End: 2023-03-28 | Stop reason: HOSPADM

## 2023-03-28 RX ORDER — MIDAZOLAM HYDROCHLORIDE 2 MG/2ML
INJECTION, SOLUTION INTRAMUSCULAR; INTRAVENOUS
Status: DISCONTINUED | OUTPATIENT
Start: 2023-03-28 | End: 2023-03-28 | Stop reason: HOSPADM

## 2023-03-28 RX ORDER — HYDROCODONE BITARTRATE AND ACETAMINOPHEN 5; 325 MG/1; MG/1
1 TABLET ORAL EVERY 4 HOURS PRN
Status: DISCONTINUED | OUTPATIENT
Start: 2023-03-28 | End: 2023-03-28 | Stop reason: HOSPADM

## 2023-03-28 RX ORDER — NALOXONE HCL 0.4 MG/ML
0.4 VIAL (ML) INJECTION
Status: CANCELLED | OUTPATIENT
Start: 2023-03-28

## 2023-03-28 RX ORDER — FENTANYL CITRATE 50 UG/ML
INJECTION, SOLUTION INTRAMUSCULAR; INTRAVENOUS
Status: DISCONTINUED | OUTPATIENT
Start: 2023-03-28 | End: 2023-03-28 | Stop reason: HOSPADM

## 2023-03-28 RX ORDER — DIAZEPAM 2 MG/1
4 TABLET ORAL ONCE
Status: DISCONTINUED | OUTPATIENT
Start: 2023-03-28 | End: 2023-03-28 | Stop reason: HOSPADM

## 2023-03-28 RX ORDER — SODIUM CHLORIDE 9 MG/ML
100 INJECTION, SOLUTION INTRAVENOUS CONTINUOUS
Status: CANCELLED | OUTPATIENT
Start: 2023-03-28 | End: 2023-03-28

## 2023-03-28 RX ORDER — LIDOCAINE HYDROCHLORIDE 10 MG/ML
INJECTION, SOLUTION INFILTRATION; PERINEURAL
Status: DISCONTINUED | OUTPATIENT
Start: 2023-03-28 | End: 2023-03-28 | Stop reason: HOSPADM

## 2023-03-28 RX ORDER — HYDROCODONE BITARTRATE AND ACETAMINOPHEN 5; 325 MG/1; MG/1
1 TABLET ORAL EVERY 4 HOURS PRN
Status: CANCELLED | OUTPATIENT
Start: 2023-03-28 | End: 2023-04-07

## 2023-03-28 RX ORDER — MORPHINE SULFATE 4 MG/ML
4 INJECTION, SOLUTION INTRAMUSCULAR; INTRAVENOUS
Status: CANCELLED | OUTPATIENT
Start: 2023-03-28

## 2023-03-28 RX ORDER — HEPARIN SODIUM 1000 [USP'U]/ML
INJECTION, SOLUTION INTRAVENOUS; SUBCUTANEOUS
Status: DISCONTINUED | OUTPATIENT
Start: 2023-03-28 | End: 2023-03-28 | Stop reason: HOSPADM

## 2023-03-28 NOTE — INTERVAL H&P NOTE
H&P reviewed. The patient was examined and there are no changes to the H&P.      Addendum  Physical examination  Ear  Nose and throat: Normal gag reflex  Neck: Normal central venous pressure  Peripheral pulses: Left and right radial pulses are present with normal Daniel testing  Lungs: Clear to auscultation  Heart: Regular rate and rhythm.  Normal S1.  Normal S2.  No murmurs rubs or gallops.

## 2023-03-30 ENCOUNTER — PREP FOR SURGERY (OUTPATIENT)
Dept: OTHER | Facility: HOSPITAL | Age: 68
End: 2023-03-30
Payer: MEDICARE

## 2023-03-30 DIAGNOSIS — I25.10 CORONARY ARTERY DISEASE: Primary | ICD-10-CM

## 2023-03-30 RX ORDER — SODIUM CHLORIDE 9 MG/ML
40 INJECTION, SOLUTION INTRAVENOUS AS NEEDED
OUTPATIENT
Start: 2023-03-30

## 2023-03-30 RX ORDER — ACETAMINOPHEN 325 MG/1
650 TABLET ORAL EVERY 4 HOURS PRN
OUTPATIENT
Start: 2023-03-30

## 2023-03-30 RX ORDER — NITROGLYCERIN 0.4 MG/1
0.4 TABLET SUBLINGUAL
OUTPATIENT
Start: 2023-03-30

## 2023-03-30 RX ORDER — ASPIRIN 81 MG/1
81 TABLET ORAL DAILY
OUTPATIENT
Start: 2023-03-31

## 2023-03-30 RX ORDER — SODIUM CHLORIDE 0.9 % (FLUSH) 0.9 %
10 SYRINGE (ML) INJECTION EVERY 12 HOURS SCHEDULED
OUTPATIENT
Start: 2023-03-30

## 2023-03-30 RX ORDER — SODIUM CHLORIDE 0.9 % (FLUSH) 0.9 %
10 SYRINGE (ML) INJECTION AS NEEDED
OUTPATIENT
Start: 2023-03-30

## 2023-03-30 RX ORDER — ASPIRIN 81 MG/1
324 TABLET, CHEWABLE ORAL ONCE
OUTPATIENT
Start: 2023-03-30 | End: 2023-03-30

## 2023-04-13 ENCOUNTER — HOSPITAL ENCOUNTER (OUTPATIENT)
Facility: HOSPITAL | Age: 68
Setting detail: HOSPITAL OUTPATIENT SURGERY
Discharge: HOME | End: 2023-04-13
Attending: INTERNAL MEDICINE | Admitting: INTERNAL MEDICINE
Payer: MEDICARE

## 2023-04-13 VITALS
HEIGHT: 74 IN | BODY MASS INDEX: 27.7 KG/M2 | WEIGHT: 215.83 LBS | RESPIRATION RATE: 16 BRPM | SYSTOLIC BLOOD PRESSURE: 127 MMHG | TEMPERATURE: 98 F | DIASTOLIC BLOOD PRESSURE: 61 MMHG | OXYGEN SATURATION: 97 % | HEART RATE: 53 BPM

## 2023-04-13 DIAGNOSIS — I25.119 CORONARY ARTERY DISEASE INVOLVING NATIVE CORONARY ARTERY OF NATIVE HEART WITH ANGINA PECTORIS: Primary | ICD-10-CM

## 2023-04-13 DIAGNOSIS — R07.2 PRECORDIAL PAIN: ICD-10-CM

## 2023-04-13 DIAGNOSIS — I25.10 CORONARY ARTERY DISEASE: ICD-10-CM

## 2023-04-13 DIAGNOSIS — I25.118 CORONARY ARTERY DISEASE INVOLVING NATIVE CORONARY ARTERY OF NATIVE HEART WITH OTHER FORM OF ANGINA PECTORIS: ICD-10-CM

## 2023-04-13 PROBLEM — I20.2 REFRACTORY ANGINA PECTORIS: Status: RESOLVED | Noted: 2023-03-20 | Resolved: 2023-04-13

## 2023-04-13 PROBLEM — R73.9 HYPERGLYCEMIA: Status: RESOLVED | Noted: 2021-11-16 | Resolved: 2023-04-13

## 2023-04-13 PROBLEM — R97.20 RAISED PROSTATE SPECIFIC ANTIGEN: Status: RESOLVED | Noted: 2023-01-12 | Resolved: 2023-04-13

## 2023-04-13 LAB
ACT BLD: 239 SECONDS (ref 82–152)
ACT BLD: 251 SECONDS (ref 82–152)
ACT BLD: 287 SECONDS (ref 82–152)
ALBUMIN SERPL-MCNC: 4.3 G/DL (ref 3.5–5.2)
ALBUMIN/GLOB SERPL: 1.6 G/DL
ALP SERPL-CCNC: 51 U/L (ref 39–117)
ALT SERPL W P-5'-P-CCNC: 17 U/L (ref 1–41)
ANION GAP SERPL CALCULATED.3IONS-SCNC: 10 MMOL/L (ref 5–15)
AST SERPL-CCNC: 15 U/L (ref 1–40)
BASOPHILS # BLD AUTO: 0.07 10*3/MM3 (ref 0–0.2)
BASOPHILS NFR BLD AUTO: 0.9 % (ref 0–1.5)
BILIRUB SERPL-MCNC: 0.4 MG/DL (ref 0–1.2)
BUN SERPL-MCNC: 18 MG/DL (ref 8–23)
BUN/CREAT SERPL: 15.9 (ref 7–25)
CALCIUM SPEC-SCNC: 9.5 MG/DL (ref 8.6–10.5)
CHLORIDE SERPL-SCNC: 106 MMOL/L (ref 98–107)
CO2 SERPL-SCNC: 23 MMOL/L (ref 22–29)
CREAT BLDA-MCNC: 1.1 MG/DL (ref 0.6–1.3)
CREAT SERPL-MCNC: 1.13 MG/DL (ref 0.76–1.27)
DEPRECATED RDW RBC AUTO: 42.4 FL (ref 37–54)
EGFRCR SERPLBLD CKD-EPI 2021: 70.4 ML/MIN/1.73
EOSINOPHIL # BLD AUTO: 0.43 10*3/MM3 (ref 0–0.4)
EOSINOPHIL NFR BLD AUTO: 5.5 % (ref 0.3–6.2)
ERYTHROCYTE [DISTWIDTH] IN BLOOD BY AUTOMATED COUNT: 13.2 % (ref 12.3–15.4)
GLOBULIN UR ELPH-MCNC: 2.7 GM/DL
GLUCOSE SERPL-MCNC: 96 MG/DL (ref 65–99)
HCT VFR BLD AUTO: 42.6 % (ref 37.5–51)
HGB BLD-MCNC: 14.5 G/DL (ref 13–17.7)
IMM GRANULOCYTES # BLD AUTO: 0.02 10*3/MM3 (ref 0–0.05)
IMM GRANULOCYTES NFR BLD AUTO: 0.3 % (ref 0–0.5)
LYMPHOCYTES # BLD AUTO: 2.21 10*3/MM3 (ref 0.7–3.1)
LYMPHOCYTES NFR BLD AUTO: 28.1 % (ref 19.6–45.3)
MCH RBC QN AUTO: 29.8 PG (ref 26.6–33)
MCHC RBC AUTO-ENTMCNC: 34 G/DL (ref 31.5–35.7)
MCV RBC AUTO: 87.7 FL (ref 79–97)
MONOCYTES # BLD AUTO: 0.66 10*3/MM3 (ref 0.1–0.9)
MONOCYTES NFR BLD AUTO: 8.4 % (ref 5–12)
NEUTROPHILS NFR BLD AUTO: 4.47 10*3/MM3 (ref 1.7–7)
NEUTROPHILS NFR BLD AUTO: 56.8 % (ref 42.7–76)
NRBC BLD AUTO-RTO: 0 /100 WBC (ref 0–0.2)
PLATELET # BLD AUTO: 231 10*3/MM3 (ref 140–450)
PMV BLD AUTO: 10.5 FL (ref 6–12)
POTASSIUM SERPL-SCNC: 4 MMOL/L (ref 3.5–5.2)
PROT SERPL-MCNC: 7 G/DL (ref 6–8.5)
RBC # BLD AUTO: 4.86 10*6/MM3 (ref 4.14–5.8)
SODIUM SERPL-SCNC: 139 MMOL/L (ref 136–145)
WBC NRBC COR # BLD: 7.86 10*3/MM3 (ref 3.4–10.8)

## 2023-04-13 PROCEDURE — C1725 CATH, TRANSLUMIN NON-LASER: HCPCS | Performed by: INTERNAL MEDICINE

## 2023-04-13 PROCEDURE — 85025 COMPLETE CBC W/AUTO DIFF WBC: CPT | Performed by: NURSE PRACTITIONER

## 2023-04-13 PROCEDURE — 92978 ENDOLUMINL IVUS OCT C 1ST: CPT | Performed by: INTERNAL MEDICINE

## 2023-04-13 PROCEDURE — C1760 CLOSURE DEV, VASC: HCPCS | Performed by: INTERNAL MEDICINE

## 2023-04-13 PROCEDURE — C1887 CATHETER, GUIDING: HCPCS | Performed by: INTERNAL MEDICINE

## 2023-04-13 PROCEDURE — C1874 STENT, COATED/COV W/DEL SYS: HCPCS | Performed by: INTERNAL MEDICINE

## 2023-04-13 PROCEDURE — C1769 GUIDE WIRE: HCPCS | Performed by: INTERNAL MEDICINE

## 2023-04-13 PROCEDURE — C1753 CATH, INTRAVAS ULTRASOUND: HCPCS | Performed by: INTERNAL MEDICINE

## 2023-04-13 PROCEDURE — 25010000002 MIDAZOLAM PER 1 MG: Performed by: INTERNAL MEDICINE

## 2023-04-13 PROCEDURE — 82565 ASSAY OF CREATININE: CPT

## 2023-04-13 PROCEDURE — C1894 INTRO/SHEATH, NON-LASER: HCPCS | Performed by: INTERNAL MEDICINE

## 2023-04-13 PROCEDURE — 25010000002 HEPARIN (PORCINE) PER 1000 UNITS: Performed by: INTERNAL MEDICINE

## 2023-04-13 PROCEDURE — 25010000002 FENTANYL CITRATE (PF) 50 MCG/ML SOLUTION: Performed by: INTERNAL MEDICINE

## 2023-04-13 PROCEDURE — 85347 COAGULATION TIME ACTIVATED: CPT

## 2023-04-13 PROCEDURE — 80053 COMPREHEN METABOLIC PANEL: CPT | Performed by: NURSE PRACTITIONER

## 2023-04-13 PROCEDURE — C9607 PERC D-E COR REVASC CHRO SIN: HCPCS | Performed by: INTERNAL MEDICINE

## 2023-04-13 PROCEDURE — 25510000001 IOPAMIDOL PER 1 ML: Performed by: INTERNAL MEDICINE

## 2023-04-13 DEVICE — XIENCE SKYPOINT™ EVEROLIMUS ELUTING CORONARY STENT SYSTEM 3.00 MM X 28 MM / RAPID-EXCHANGE
Type: IMPLANTABLE DEVICE | Status: FUNCTIONAL
Brand: XIENCE SKYPOINT™

## 2023-04-13 RX ORDER — CLOPIDOGREL BISULFATE 75 MG/1
TABLET ORAL
Status: DISCONTINUED | OUTPATIENT
Start: 2023-04-13 | End: 2023-04-13 | Stop reason: HOSPADM

## 2023-04-13 RX ORDER — MIDAZOLAM HYDROCHLORIDE 1 MG/ML
INJECTION INTRAMUSCULAR; INTRAVENOUS
Status: DISCONTINUED | OUTPATIENT
Start: 2023-04-13 | End: 2023-04-13 | Stop reason: HOSPADM

## 2023-04-13 RX ORDER — SODIUM CHLORIDE 9 MG/ML
40 INJECTION, SOLUTION INTRAVENOUS AS NEEDED
Status: DISCONTINUED | OUTPATIENT
Start: 2023-04-13 | End: 2023-04-13 | Stop reason: HOSPADM

## 2023-04-13 RX ORDER — SODIUM CHLORIDE 0.9 % (FLUSH) 0.9 %
10 SYRINGE (ML) INJECTION EVERY 12 HOURS SCHEDULED
Status: DISCONTINUED | OUTPATIENT
Start: 2023-04-13 | End: 2023-04-13 | Stop reason: HOSPADM

## 2023-04-13 RX ORDER — ASPIRIN 81 MG/1
81 TABLET ORAL DAILY
Status: DISCONTINUED | OUTPATIENT
Start: 2023-04-14 | End: 2023-04-13 | Stop reason: HOSPADM

## 2023-04-13 RX ORDER — SODIUM CHLORIDE 9 MG/ML
3 INJECTION, SOLUTION INTRAVENOUS CONTINUOUS
Status: ACTIVE | OUTPATIENT
Start: 2023-04-13 | End: 2023-04-13

## 2023-04-13 RX ORDER — NITROGLYCERIN 0.4 MG/1
0.4 TABLET SUBLINGUAL
Status: DISCONTINUED | OUTPATIENT
Start: 2023-04-13 | End: 2023-04-13 | Stop reason: HOSPADM

## 2023-04-13 RX ORDER — FENTANYL CITRATE 50 UG/ML
INJECTION, SOLUTION INTRAMUSCULAR; INTRAVENOUS
Status: DISCONTINUED | OUTPATIENT
Start: 2023-04-13 | End: 2023-04-13 | Stop reason: HOSPADM

## 2023-04-13 RX ORDER — HEPARIN SODIUM 1000 [USP'U]/ML
INJECTION, SOLUTION INTRAVENOUS; SUBCUTANEOUS
Status: DISCONTINUED | OUTPATIENT
Start: 2023-04-13 | End: 2023-04-13 | Stop reason: HOSPADM

## 2023-04-13 RX ORDER — LIDOCAINE HYDROCHLORIDE 10 MG/ML
INJECTION, SOLUTION EPIDURAL; INFILTRATION; INTRACAUDAL; PERINEURAL
Status: DISCONTINUED | OUTPATIENT
Start: 2023-04-13 | End: 2023-04-13 | Stop reason: HOSPADM

## 2023-04-13 RX ORDER — ASPIRIN 81 MG/1
324 TABLET, CHEWABLE ORAL ONCE
Status: COMPLETED | OUTPATIENT
Start: 2023-04-13 | End: 2023-04-13

## 2023-04-13 RX ORDER — ACETAMINOPHEN 325 MG/1
650 TABLET ORAL EVERY 4 HOURS PRN
Status: DISCONTINUED | OUTPATIENT
Start: 2023-04-13 | End: 2023-04-13 | Stop reason: HOSPADM

## 2023-04-13 RX ORDER — CLOPIDOGREL BISULFATE 75 MG/1
75 TABLET ORAL DAILY
Qty: 90 TABLET | Refills: 1 | Status: SHIPPED | OUTPATIENT
Start: 2023-04-13

## 2023-04-13 RX ORDER — ACETAMINOPHEN 325 MG/1
650 TABLET ORAL ONCE
Status: COMPLETED | OUTPATIENT
Start: 2023-04-13 | End: 2023-04-13

## 2023-04-13 RX ORDER — SODIUM CHLORIDE 0.9 % (FLUSH) 0.9 %
10 SYRINGE (ML) INJECTION AS NEEDED
Status: DISCONTINUED | OUTPATIENT
Start: 2023-04-13 | End: 2023-04-13 | Stop reason: HOSPADM

## 2023-04-13 RX ADMIN — ASPIRIN 81 MG CHEWABLE TABLET 324 MG: 81 TABLET CHEWABLE at 07:55

## 2023-04-13 RX ADMIN — SODIUM CHLORIDE 3 ML/KG/HR: 9 INJECTION, SOLUTION INTRAVENOUS at 11:36

## 2023-04-13 RX ADMIN — SODIUM CHLORIDE 3 ML/KG/HR: 9 INJECTION, SOLUTION INTRAVENOUS at 07:56

## 2023-04-13 RX ADMIN — ACETAMINOPHEN 325MG 650 MG: 325 TABLET ORAL at 11:36

## 2023-04-13 NOTE — Clinical Note
First balloon inflation max pressure = 8 kayla. First balloon inflation duration = 18 seconds. Second inflation of balloon - Max pressure = 12 kayla. 2nd Inflation of balloon - Duration = 18 seconds. 2nd inflation was done at 09:48 EDT.

## 2023-04-13 NOTE — Clinical Note
Hemostasis started on the left femoral artery. Perclose was used in achieving hemostasis. Closure device deployed in the vessel. Hemostasis achieved successfully.

## 2023-04-13 NOTE — Clinical Note
First balloon inflation max pressure = 14 kayla. First balloon inflation duration = 15 seconds. Second inflation of balloon - Max pressure = 18 kayla. 2nd Inflation of balloon - Duration = 14 seconds. 2nd inflation was done at 10:04 EDT. Third inflation of balloon - Max pressure = 18 kayla. 3rd Inflation of balloon - Duration = 20 seconds. 3rd inflation was done at 10:05 EDT.

## 2023-04-13 NOTE — Clinical Note
The DP pulses are +2 bilaterally. The PT pulses are +2 bilaterally. The femoral pulses are +2 bilaterally.

## 2023-04-13 NOTE — Clinical Note
A 6 fr sheath was  inserted with ultrasound guidance into the left femoral artery. Sheath insertion not delayed.

## 2023-04-13 NOTE — H&P
HISTORY AND PHYSICAL       Referring cardiologist: Gabe Mcqueen MD    Chief complaint: Chest pain.           Carmelo Arnold is a 67 y.o. gentleman who has been experiencing CCS class III angina over the last several months.  In December, he went GXT stress testing and developed angina within 3-1/2 minutes of exercise.  Subsequently, he underwent CT coronary angiogram which showed occlusion of the proximal LAD.  Cardiac catheterization was performed at [facility] last month.  Chronic total occlusion of the proximal mLAD was confirmed and attempted  PCI was unsuccessful.  The patient has been referred to [facility] for repeat  PCI attempt.    Patient continues to have anginal symptoms with moderate exertion.    Review of Systems:  As noted in the HPI    PFSH:  Patient Active Problem List   Diagnosis   • Vitamin D deficiency   • Benign prostatic hyperplasia with urinary obstruction   • Hypothyroidism   • Hyperlipidemia LDL goal <70   • B12 deficiency   • PAC (premature atrial contraction)   • Ventral hernia   • Benign colon polyp   • Chronic pain of right knee   • Depression   • Primary hypertension   • Impotence of organic origin   • Chronic prostatitis   • Incomplete emptying of bladder   • Induration penis plastica   • Coronary artery disease involving native coronary artery of native heart with angina pectoris       No current facility-administered medications on file prior to encounter.     Current Outpatient Medications on File Prior to Encounter   Medication Sig Dispense Refill   • Aspirin 81 MG capsule Take 81 mg by mouth Daily.     • levothyroxine (SYNTHROID, LEVOTHROID) 88 MCG tablet TAKE 1 TABLET BY MOUTH EVERY DAY 90 tablet 3   • lisinopril-hydrochlorothiazide (Zestoretic) 10-12.5 MG per tablet Take 1 tablet by mouth Daily. 90 tablet 4   • loratadine (CLARITIN) 10 MG tablet Take 1 tablet by mouth Daily.     • metoprolol succinate XL (TOPROL-XL) 25 MG 24 hr tablet TAKE 1  TABLET BY MOUTH EVERY DAY 90 tablet 3   • nitroglycerin (Nitrostat) 0.3 MG SL tablet Place 1 tablet under the tongue Every 5 (Five) Minutes As Needed for Chest Pain. Take no more than 3 doses in 15 minutes. 10 tablet 12   • rosuvastatin (CRESTOR) 10 MG tablet Take 1 tablet by mouth Daily. 90 tablet 3   • vitamin B-12 (CYANOCOBALAMIN) 1000 MCG tablet Take 1 tablet by mouth Daily.         Social History     Socioeconomic History   • Marital status:    Tobacco Use   • Smoking status: Never     Passive exposure: Past   • Smokeless tobacco: Never   Substance and Sexual Activity   • Alcohol use: Yes     Comment: rarely drink   • Drug use: Never   • Sexual activity: Not Currently     Partners: Female            Objective:     Vital Sign Min/Max for last 24 hours  Temp  Min: 98 °F (36.7 °C)  Max: 98 °F (36.7 °C)   BP  Min: 122/67  Max: 135/82   Pulse  Min: 57  Max: 57   No data recorded   SpO2  Min: 98 %  Max: 98 %   No data recorded    No intake or output data in the 24 hours ending 04/13/23 0843        Vitals:    04/13/23 0730   BP: 122/67   Pulse: 57   Temp: 98 °F (36.7 °C)   SpO2: 98%     Physical Exam  Constitutional:       Appearance: Normal appearance.   Cardiovascular:      Rate and Rhythm: Normal rate and regular rhythm.      Pulses:           Femoral pulses are 2+ on the left side.     Heart sounds: No murmur heard.  Pulmonary:      Effort: Pulmonary effort is normal.   Neurological:      Mental Status: He is alert.           Labs and radiologic results:  Today's results were reviewed by myself.    Cardiac Data:    EKG 11/27/2022:  Sinus bradycardia with PACs     Results for orders placed in visit on 12/02/22    Adult Transthoracic Echo Complete W/ Cont if Necessary Per Protocol    Interpretation Summary  •  Left ventricular systolic function is normal. Left ventricular ejection fraction appears to be 61 - 65%.  •  Left ventricular wall thickness is consistent with mild concentric hypertrophy.  •  Left  ventricular diastolic function was normal.  •  Estimated right ventricular systolic pressure from tricuspid regurgitation is normal (<35 mmHg). Calculated right ventricular systolic pressure from tricuspid regurgitation is 14 mmHg.      Tele:  SB with PACs    Lab Results   Component Value Date    GLUCOSE 96 04/13/2023    BUN 18 04/13/2023    CREATININE 1.10 04/13/2023    EGFRRESULT 74.7 05/16/2022    EGFR 70.4 04/13/2023    BCR 15.9 04/13/2023    K 4.0 04/13/2023    CO2 23.0 04/13/2023    CALCIUM 9.5 04/13/2023    PROTENTOTREF 7.1 05/16/2022    ALBUMIN 4.3 04/13/2023    BILITOT 0.4 04/13/2023    AST 15 04/13/2023    ALT 17 04/13/2023     Lab Results   Component Value Date    WBC 7.86 04/13/2023    HGB 14.5 04/13/2023    HCT 42.6 04/13/2023    MCV 87.7 04/13/2023     04/13/2023     Lab Results   Component Value Date    CHOL 194 01/13/2023    TRIG 168 (H) 01/13/2023    HDL 51 01/13/2023     (H) 01/13/2023    AST 15 04/13/2023    ALT 17 04/13/2023     Lab Results   Component Value Date    HGBA1C 5.40 11/19/2021            Active Hospital Problems    Diagnosis  POA   • **Coronary artery disease involving native coronary artery of native heart with angina pectoris [I25.119]  Yes     Priority: High     · GXT (12/5/2023): Exercised for 3.5 minutes with development of chest pain, shortness of breath  · Echo (12/20/2022): LVEF 62%.  No significant valve abnormality  · Cardiac catheterization (3/28/2023): Severe 1-vessel CAD involving chronic total occlusion of the proximal LAD     • Primary hypertension [I10]  Yes     • Target blood pressure <130/80 mmHg     • Hyperlipidemia LDL goal <70 [E78.5]  Yes     • High intensity statin therapy indicated given the presence of CAD            ·  PCI of the proximal LAD using bilateral groin access  · Contrast threshold 266 mL    H. C. Timbo Cortez MD [facility], [facility]  Interventional and General Cardiology

## 2023-04-13 NOTE — Clinical Note
A 8 fr sheath was  inserted with ultrasound guidance into the right femoral artery. Sheath insertion not delayed.

## 2023-04-13 NOTE — Clinical Note
First balloon inflation max pressure = 18 kayla. First balloon inflation duration = 15 seconds. Second inflation of balloon - Max pressure = 12 kayla. 2nd Inflation of balloon - Duration = 12 seconds. 2nd inflation was done at 10:07 EDT.

## 2023-04-14 ENCOUNTER — CALL CENTER PROGRAMS (OUTPATIENT)
Dept: CALL CENTER | Facility: HOSPITAL | Age: 68
End: 2023-04-14
Payer: MEDICARE

## 2023-04-14 ENCOUNTER — DOCUMENTATION (OUTPATIENT)
Dept: CARDIAC REHAB | Facility: HOSPITAL | Age: 68
End: 2023-04-14
Payer: MEDICARE

## 2023-04-14 NOTE — OUTREACH NOTE
PCI/Device Survey    Flowsheet Row Responses   Facility patient discharged from? [facility]   Procedure date 04/13/23   Procedure (if device, specify in description) PCI   PCI site Right, Left, Groin   Performing MD Dr. Kevin Cortez   Attempt successful? No   Unsuccessful attempts Attempt 1          Luna ARNOLD - Registered Nurse

## 2023-04-14 NOTE — PROGRESS NOTES
Referral received for Phase II Cardiac Rehab.  Staff reviewed chart and patient has qualifying diagnosis for Phase II Cardiac Rehab.  Staff sent referral to [facility] in regards to scheduling an appointment.

## 2023-04-14 NOTE — OUTREACH NOTE
PCI/Device Survey    Flowsheet Row Responses   Facility patient discharged from? [facility]   Procedure date 04/13/23   Procedure (if device, specify in description) PCI   PCI site Right, Left, Groin   Performing MD Dr. Kevin Cortez   Attempt successful? No   Unsuccessful attempts Attempt 2          Luna ARNOLD - Registered Nurse

## 2023-04-25 ENCOUNTER — TREATMENT (OUTPATIENT)
Dept: CARDIAC REHAB | Facility: HOSPITAL | Age: 68
End: 2023-04-25
Payer: MEDICARE

## 2023-04-25 DIAGNOSIS — Z95.5 STATUS POST CORONARY ARTERY STENT PLACEMENT: Primary | ICD-10-CM

## 2023-04-25 PROCEDURE — 93798 PHYS/QHP OP CAR RHAB W/ECG: CPT

## 2023-04-25 NOTE — PROGRESS NOTES
Pt was seen today in [facility] for a Phase II visit. Vital signs and session notes recorded in [employer] and will be scanned into Epic by HIM.

## 2023-04-27 ENCOUNTER — TREATMENT (OUTPATIENT)
Dept: CARDIAC REHAB | Facility: HOSPITAL | Age: 68
End: 2023-04-27
Payer: MEDICARE

## 2023-04-27 DIAGNOSIS — Z95.5 STATUS POST CORONARY ARTERY STENT PLACEMENT: Primary | ICD-10-CM

## 2023-04-27 PROCEDURE — 93798 PHYS/QHP OP CAR RHAB W/ECG: CPT

## 2023-04-27 NOTE — PROGRESS NOTES
Pt was seen today in [facility] for a Phase 2 visit.  Vital signs and session notes recorded in [employer] and will be scanned into Epic by HIM.

## 2023-05-02 ENCOUNTER — TREATMENT (OUTPATIENT)
Dept: CARDIAC REHAB | Facility: HOSPITAL | Age: 68
End: 2023-05-02
Payer: MEDICARE

## 2023-05-02 DIAGNOSIS — Z95.5 STATUS POST CORONARY ARTERY STENT PLACEMENT: Primary | ICD-10-CM

## 2023-05-02 PROCEDURE — 93798 PHYS/QHP OP CAR RHAB W/ECG: CPT

## 2023-05-02 NOTE — PROGRESS NOTES
Pt was seen today in CR for a Phase 2 visit.  Vital signs and session notes recorded in [employer] and will be scanned into Epic by HIM.

## 2023-05-04 ENCOUNTER — TREATMENT (OUTPATIENT)
Dept: CARDIAC REHAB | Facility: HOSPITAL | Age: 68
End: 2023-05-04
Payer: MEDICARE

## 2023-05-04 DIAGNOSIS — Z95.5 STATUS POST CORONARY ARTERY STENT PLACEMENT: Primary | ICD-10-CM

## 2023-05-04 PROCEDURE — 93798 PHYS/QHP OP CAR RHAB W/ECG: CPT

## 2023-05-04 NOTE — PROGRESS NOTES
Pt was seen today in [facility] for a Phase II visit. Vital signs and session notes recorded in [employer] and will be scanned into Epic by HIM.

## 2023-05-09 ENCOUNTER — TREATMENT (OUTPATIENT)
Dept: CARDIAC REHAB | Facility: HOSPITAL | Age: 68
End: 2023-05-09
Payer: MEDICARE

## 2023-05-09 DIAGNOSIS — Z95.5 STATUS POST CORONARY ARTERY STENT PLACEMENT: Primary | ICD-10-CM

## 2023-05-09 PROCEDURE — 93798 PHYS/QHP OP CAR RHAB W/ECG: CPT

## 2023-05-09 NOTE — PROGRESS NOTES
Pt was seen today in [facility] for a Phase 2 visit.  Vital signs and session notes recorded in [employer] and will be scanned into Epic by HIM.

## 2023-05-12 DIAGNOSIS — E78.5 HYPERLIPIDEMIA, UNSPECIFIED HYPERLIPIDEMIA TYPE: ICD-10-CM

## 2023-05-12 RX ORDER — ROSUVASTATIN CALCIUM 10 MG/1
TABLET, COATED ORAL
Qty: 90 TABLET | Refills: 3 | Status: SHIPPED | OUTPATIENT
Start: 2023-05-12

## 2023-05-16 ENCOUNTER — TREATMENT (OUTPATIENT)
Dept: CARDIAC REHAB | Facility: HOSPITAL | Age: 68
End: 2023-05-16
Payer: MEDICARE

## 2023-05-16 DIAGNOSIS — Z95.5 STATUS POST CORONARY ARTERY STENT PLACEMENT: Primary | ICD-10-CM

## 2023-05-16 PROCEDURE — 93798 PHYS/QHP OP CAR RHAB W/ECG: CPT

## 2023-05-16 NOTE — PROGRESS NOTES
Pt was seen today in [facility] for a Phase II visit. Vital signs and session notes recorded in [employer] and will be scanned into Epic by HIM.

## 2023-05-18 ENCOUNTER — OFFICE VISIT (OUTPATIENT)
Dept: CARDIOLOGY | Facility: CLINIC | Age: 68
End: 2023-05-18
Payer: MEDICARE

## 2023-05-18 ENCOUNTER — TREATMENT (OUTPATIENT)
Dept: CARDIAC REHAB | Facility: HOSPITAL | Age: 68
End: 2023-05-18
Payer: MEDICARE

## 2023-05-18 VITALS
HEART RATE: 66 BPM | BODY MASS INDEX: 27.46 KG/M2 | HEIGHT: 74 IN | SYSTOLIC BLOOD PRESSURE: 132 MMHG | DIASTOLIC BLOOD PRESSURE: 70 MMHG | OXYGEN SATURATION: 98 % | WEIGHT: 214 LBS

## 2023-05-18 DIAGNOSIS — I10 PRIMARY HYPERTENSION: ICD-10-CM

## 2023-05-18 DIAGNOSIS — E78.5 DYSLIPIDEMIA: ICD-10-CM

## 2023-05-18 DIAGNOSIS — Z95.5 STATUS POST CORONARY ARTERY STENT PLACEMENT: Primary | ICD-10-CM

## 2023-05-18 DIAGNOSIS — I25.10 CORONARY ARTERY DISEASE INVOLVING NATIVE CORONARY ARTERY OF NATIVE HEART WITHOUT ANGINA PECTORIS: Primary | ICD-10-CM

## 2023-05-18 PROCEDURE — 3078F DIAST BP <80 MM HG: CPT | Performed by: INTERNAL MEDICINE

## 2023-05-18 PROCEDURE — 3075F SYST BP GE 130 - 139MM HG: CPT | Performed by: INTERNAL MEDICINE

## 2023-05-18 PROCEDURE — 93798 PHYS/QHP OP CAR RHAB W/ECG: CPT

## 2023-05-18 PROCEDURE — 1159F MED LIST DOCD IN RCRD: CPT | Performed by: INTERNAL MEDICINE

## 2023-05-18 PROCEDURE — 99213 OFFICE O/P EST LOW 20 MIN: CPT | Performed by: INTERNAL MEDICINE

## 2023-05-18 PROCEDURE — 1160F RVW MEDS BY RX/DR IN RCRD: CPT | Performed by: INTERNAL MEDICINE

## 2023-05-18 NOTE — PROGRESS NOTES
Subjective:     Encounter Date:05/18/2023      Patient ID: Carmelo Arnold is a 67 y.o. male.    Chief Complaint: Coronary artery disease  HPI  This is a 67-year-old male patient who recently underwent successful revascularization of a chronically occluded mid LAD by Dr. Timbo [place].  Since this procedure, the patient indicates that all of his symptoms have resolved.  He is currently enrolled in cardiac rehab and is progressing well without symptoms or limitations.  He reports compliance with his medications with no perceived side effects.  He remains a non-smoker.  The following portions of the patient's history were reviewed and updated as appropriate: allergies, current medications, past family history, past medical history, past social history, past surgical history and problem  Review of Systems   Constitutional: Negative for chills, diaphoresis, fever, malaise/fatigue, weight gain and weight loss.   HENT: Negative for ear discharge, hearing loss, hoarse voice and nosebleeds.    Eyes: Negative for discharge, double vision, pain and photophobia.   Cardiovascular: Negative for chest pain, claudication, cyanosis, dyspnea on exertion, irregular heartbeat, leg swelling, near-syncope, orthopnea, palpitations, paroxysmal nocturnal dyspnea and syncope.   Respiratory: Negative for cough, hemoptysis, shortness of breath, sputum production and wheezing.    Endocrine: Negative for cold intolerance, heat intolerance, polydipsia, polyphagia and polyuria.   Hematologic/Lymphatic: Negative for adenopathy and bleeding problem. Does not bruise/bleed easily.   Skin: Negative for color change, flushing, itching and rash.   Musculoskeletal: Negative for muscle cramps, muscle weakness, myalgias and stiffness.   Gastrointestinal: Negative for abdominal pain, diarrhea, hematemesis, hematochezia, nausea and vomiting.   Genitourinary: Negative for dysuria, frequency and nocturia.   Neurological: Negative for focal weakness, loss  of balance, numbness, paresthesias and seizures.   Psychiatric/Behavioral: Negative for altered mental status, hallucinations and suicidal ideas.   Allergic/Immunologic: Negative for HIV exposure, hives and persistent infections.           Current Outpatient Medications:   •  Aspirin 81 MG capsule, Take 81 mg by mouth Daily., Disp: , Rfl:   •  clopidogrel (PLAVIX) 75 MG tablet, Take 1 tablet by mouth Daily., Disp: 90 tablet, Rfl: 1  •  levothyroxine (SYNTHROID, LEVOTHROID) 88 MCG tablet, TAKE 1 TABLET BY MOUTH EVERY DAY, Disp: 90 tablet, Rfl: 3  •  lisinopril-hydrochlorothiazide (Zestoretic) 10-12.5 MG per tablet, Take 1 tablet by mouth Daily., Disp: 90 tablet, Rfl: 4  •  loratadine (CLARITIN) 10 MG tablet, Take 1 tablet by mouth Daily., Disp: , Rfl:   •  metoprolol succinate XL (TOPROL-XL) 25 MG 24 hr tablet, TAKE 1 TABLET BY MOUTH EVERY DAY, Disp: 90 tablet, Rfl: 3  •  nitroglycerin (Nitrostat) 0.3 MG SL tablet, Place 1 tablet under the tongue Every 5 (Five) Minutes As Needed for Chest Pain. Take no more than 3 doses in 15 minutes., Disp: 10 tablet, Rfl: 12  •  rosuvastatin (CRESTOR) 10 MG tablet, TAKE 1 TABLET BY MOUTH ONCE DAILY, Disp: 90 tablet, Rfl: 3  •  vitamin B-12 (CYANOCOBALAMIN) 1000 MCG tablet, Take 1 tablet by mouth Daily., Disp: , Rfl:     Objective:   Vitals and nursing note reviewed.   Constitutional:       Appearance: Healthy appearance. Not in distress.   Neck:      Vascular: No JVR. JVD normal.   Pulmonary:      Effort: Pulmonary effort is normal.      Breath sounds: Normal breath sounds. No wheezing. No rhonchi. No rales.   Chest:      Chest wall: Not tender to palpatation.   Cardiovascular:      PMI at left midclavicular line. Normal rate. Regular rhythm. Normal S1. Normal S2.      Murmurs: There is no murmur.      No gallop. No click. No rub.   Pulses:     Intact distal pulses.   Edema:     Peripheral edema absent.   Abdominal:      General: Bowel sounds are normal.      Palpations: Abdomen is  "soft.      Tenderness: There is no abdominal tenderness.   Musculoskeletal: Normal range of motion.         General: No tenderness. Skin:     General: Skin is warm and dry.   Neurological:      General: No focal deficit present.      Mental Status: Alert and oriented to person, place and time.       Blood pressure 132/70, pulse 66, height 188 cm (74\"), weight 97.1 kg (214 lb), SpO2 98 %.   Lab Review:     Assessment:       1. Coronary artery disease involving native coronary artery of native heart without angina pectoris      2. Dyslipidemia  Tolerating statin based cholesterol-lowering therapy without side effects.    3. Primary hypertension  Acceptable blood pressure control.    Procedures    Plan:     Advance Care Planning   ACP discussion was held with the patient during this visit. Patient has an advance directive (not in EMR), copy requested.     No changes in medications have been made at today's visit.    No further cardiovascular testing warranted at this time.    The patient has been engaged in an extensive discussion related to \"heart healthy lifestyles\".  We have discussed the importance of medication compliance.  We have discussed the ultimate goal of achieving at minimum 150 minutes/week of moderate intensity activity, which includes walking at a brisk but comfortable pace.  We have recommended a heart healthy diet with preference of a Mediterranean-style diet.      "

## 2023-05-18 NOTE — PROGRESS NOTES
Pt was seen today in [facility] for a Phase 2 visit.  Vital signs and session notes recorded in [employer] and will be scanned into Epic by HIM.

## 2023-05-23 ENCOUNTER — TREATMENT (OUTPATIENT)
Dept: CARDIAC REHAB | Facility: HOSPITAL | Age: 68
End: 2023-05-23
Payer: MEDICARE

## 2023-05-23 DIAGNOSIS — Z95.5 STATUS POST CORONARY ARTERY STENT PLACEMENT: Primary | ICD-10-CM

## 2023-05-23 PROCEDURE — 93798 PHYS/QHP OP CAR RHAB W/ECG: CPT

## 2023-05-23 NOTE — PROGRESS NOTES
Pt was seen today in [facility] for a Phase 2 visit.  Vital signs and session notes recorded in [employer] and will be scanned into Epic by HIM.

## 2023-05-25 ENCOUNTER — TREATMENT (OUTPATIENT)
Dept: CARDIAC REHAB | Facility: HOSPITAL | Age: 68
End: 2023-05-25
Payer: MEDICARE

## 2023-05-25 DIAGNOSIS — Z95.5 STATUS POST CORONARY ARTERY STENT PLACEMENT: Primary | ICD-10-CM

## 2023-05-25 PROCEDURE — 93798 PHYS/QHP OP CAR RHAB W/ECG: CPT

## 2023-05-25 NOTE — PROGRESS NOTES
Pt was seen today in [facility] for a Phase II visit. Vital signs and session notes recorded in [employer] and will be scanned into Epic by HIM.

## 2023-05-30 ENCOUNTER — TREATMENT (OUTPATIENT)
Dept: CARDIAC REHAB | Facility: HOSPITAL | Age: 68
End: 2023-05-30

## 2023-05-30 DIAGNOSIS — Z95.5 STATUS POST CORONARY ARTERY STENT PLACEMENT: Primary | ICD-10-CM

## 2023-05-30 PROCEDURE — 93798 PHYS/QHP OP CAR RHAB W/ECG: CPT

## 2023-05-30 NOTE — PROGRESS NOTES
Pt was seen today in [facility] for a Phase 2 visit.  Vital signs and session notes recorded in [employer] and will be scanned into Epic by HIM.

## 2023-06-01 ENCOUNTER — TREATMENT (OUTPATIENT)
Dept: CARDIAC REHAB | Facility: HOSPITAL | Age: 68
End: 2023-06-01
Payer: MEDICARE

## 2023-06-01 ENCOUNTER — HOSPITAL ENCOUNTER (OUTPATIENT)
Dept: CT IMAGING | Facility: HOSPITAL | Age: 68
Discharge: HOME | End: 2023-06-01
Admitting: NURSE PRACTITIONER

## 2023-06-01 DIAGNOSIS — J84.10 CALCIFIED GRANULOMA OF LUNG: ICD-10-CM

## 2023-06-01 DIAGNOSIS — R91.1 LEFT LOWER LOBE PULMONARY NODULE: ICD-10-CM

## 2023-06-01 DIAGNOSIS — Z95.5 STATUS POST CORONARY ARTERY STENT PLACEMENT: Primary | ICD-10-CM

## 2023-06-01 PROCEDURE — 25510000001 IOPAMIDOL 61 % SOLUTION: Performed by: NURSE PRACTITIONER

## 2023-06-01 PROCEDURE — 71260 CT THORAX DX C+: CPT

## 2023-06-01 PROCEDURE — 93798 PHYS/QHP OP CAR RHAB W/ECG: CPT

## 2023-06-01 RX ADMIN — IOPAMIDOL 100 ML: 612 INJECTION, SOLUTION INTRAVENOUS at 08:38

## 2023-06-01 NOTE — PROGRESS NOTES
Pt was seen today in [facility] for a Phase II visit. Vital signs and session notes recorded in [employer] and will be scanned into Epic by HIM.

## 2023-06-06 ENCOUNTER — TREATMENT (OUTPATIENT)
Dept: CARDIAC REHAB | Facility: HOSPITAL | Age: 68
End: 2023-06-06
Payer: MEDICARE

## 2023-06-06 DIAGNOSIS — Z95.5 STATUS POST CORONARY ARTERY STENT PLACEMENT: Primary | ICD-10-CM

## 2023-06-06 PROCEDURE — 93798 PHYS/QHP OP CAR RHAB W/ECG: CPT

## 2023-06-09 ENCOUNTER — OFFICE VISIT (OUTPATIENT)
Dept: INTERNAL MEDICINE | Facility: CLINIC | Age: 68
End: 2023-06-09
Payer: MEDICARE

## 2023-06-09 VITALS
BODY MASS INDEX: 27.46 KG/M2 | OXYGEN SATURATION: 99 % | SYSTOLIC BLOOD PRESSURE: 134 MMHG | HEART RATE: 56 BPM | TEMPERATURE: 97.7 F | HEIGHT: 74 IN | DIASTOLIC BLOOD PRESSURE: 82 MMHG | WEIGHT: 214 LBS

## 2023-06-09 DIAGNOSIS — E03.9 HYPOTHYROIDISM, UNSPECIFIED TYPE: Primary | ICD-10-CM

## 2023-06-09 DIAGNOSIS — J84.10 CALCIFIED GRANULOMA OF LUNG: ICD-10-CM

## 2023-06-09 DIAGNOSIS — I25.10 CORONARY ARTERY DISEASE INVOLVING NATIVE CORONARY ARTERY OF NATIVE HEART WITHOUT ANGINA PECTORIS: ICD-10-CM

## 2023-06-09 PROCEDURE — 99213 OFFICE O/P EST LOW 20 MIN: CPT | Performed by: NURSE PRACTITIONER

## 2023-06-09 PROCEDURE — 3079F DIAST BP 80-89 MM HG: CPT | Performed by: NURSE PRACTITIONER

## 2023-06-09 PROCEDURE — 3075F SYST BP GE 130 - 139MM HG: CPT | Performed by: NURSE PRACTITIONER

## 2023-06-09 PROCEDURE — 1160F RVW MEDS BY RX/DR IN RCRD: CPT | Performed by: NURSE PRACTITIONER

## 2023-06-09 PROCEDURE — 1159F MED LIST DOCD IN RCRD: CPT | Performed by: NURSE PRACTITIONER

## 2023-06-09 RX ORDER — LEVOTHYROXINE SODIUM 88 UG/1
TABLET ORAL
Qty: 90 TABLET | Refills: 3 | Status: SHIPPED | OUTPATIENT
Start: 2023-06-09

## 2023-06-09 RX ORDER — METOPROLOL SUCCINATE 25 MG/1
25 TABLET, EXTENDED RELEASE ORAL DAILY
Qty: 90 TABLET | Refills: 3 | Status: SHIPPED | OUTPATIENT
Start: 2023-06-09

## 2023-06-09 NOTE — PROGRESS NOTES
Office Visit      Patient Name: Carmelo Arnold  : 1955   MRN: [id number]   Care Team: Patient Care Team:  Salas Meléndez MD as PCP - General (Internal Medicine)  Negrita Herrera MD as Consulting Physician (General Surgery)  Negrita Herrera MD as Consulting Physician (General Surgery)  Gabe Mcqueen MD as Consulting Physician (Cardiology)    Chief Complaint  Follow-up (CT results)    Subjective     Subjective      Carmelo Arnold presents to [facility] for hypothyroidism and CT results.  Hypothyroidism- taking levothyroxine as prescribed. Denies heat/cold intolerance, changes in bowel habits, and weight gain.   Lab Results   Component Value Date    TSH 2.950 2023     Needing refills today.   He is also following up on CT chest results. This was a repeat CT due to findings of non-calcified granulomas. Findings are stable with a few new tiny findings. He denies any shortness of breath, cough, hemoptysis, or night sweats.   CT Chest With Contrast Diagnostic    Result Date: 2023  PROCEDURE: CT CHEST W CONTRAST DIAGNOSTIC-  HISTORY: 6 month follow-up on lung nodule, LLL 6 mm; J84.10-Pulmonary fibrosis, unspecified; R91.1-Solitary pulmonary nodule  COMPARISON: 2023.  PROCEDURE: The patient was injected with IV contrast.  Axial images were obtained from the lung apex to the mid abdomen by computed tomography. This study was performed with techniques to keep radiation doses as low as reasonably achievable, (ALARA). Individualized dose reduction techniques using automated exposure control or adjustment of mA and/or kV according to the patient size were employed.  FINDINGS:  CHEST: There is no suspicious axillary adenopathy. There is no suspicious hilar or mediastinal adenopathy.  The heart is proper size. There is no pericardial or pleural effusion.  There are 4 and 6 mm noncalcified left lower lobe nodules, stable from the prior study. 3 mm right upper lobe  "nodule is also stable. Another subcentimeter right lower lobe nodule is identified laterally, stable. Limited images of the upper abdomen demonstrate a circumscribed, hypodense lesions in the right kidney. Largest is partially visualized measuring up to 99 mm. Both of these lesion measure less than 20 Hounsfield units consistent with cysts. There is evidence of previous calcified granulomatous disease.      Impression: Stable 6 mm or less bilateral noncalcified pulmonary nodules compared to 01/31/2023. Recommend 6 month follow-up chest CT.  Right renal cyst.  This report was signed and finalized on 6/1/2023 3:11 PM by Rayne Parker MD.    He has had no further chest pain since heart cath and stent placement. He completed cardiac rehab.       Objective     Objective   Vital Signs:   /82   Pulse 56   Temp 97.7 °F (36.5 °C)   Ht 188 cm (74\")   Wt 97.1 kg (214 lb)   SpO2 99%   BMI 27.48 kg/m²     Physical Exam  Vitals and nursing note reviewed.   Constitutional:       General: He is not in acute distress.     Appearance: Normal appearance. He is not toxic-appearing.   Eyes:      Pupils: Pupils are equal, round, and reactive to light.   Neck:      Vascular: No carotid bruit.   Cardiovascular:      Rate and Rhythm: Normal rate and regular rhythm.      Heart sounds: Normal heart sounds. No murmur heard.  Pulmonary:      Effort: Pulmonary effort is normal. No respiratory distress.      Breath sounds: Normal breath sounds. No wheezing.   Abdominal:      General: Bowel sounds are normal. There is no distension.      Palpations: Abdomen is soft.      Tenderness: There is no abdominal tenderness.   Musculoskeletal:         General: Normal range of motion.      Cervical back: Neck supple. No tenderness.   Skin:     General: Skin is warm and dry.      Findings: No rash.   Neurological:      General: No focal deficit present.      Mental Status: He is alert.   Psychiatric:         Mood and Affect: Mood normal.         " Behavior: Behavior normal.        Assessment / Plan      Assessment & Plan   Problem List Items Addressed This Visit        Cardiac and Vasculature    Coronary artery disease involving native coronary artery of native heart without angina pectoris    Overview     · GXT (12/5/2023): Exercised for 3.5 minutes with development of chest pain, shortness of breath  · Echo (12/20/2022): LVEF 62%.  No significant valve abnormality  · Cardiac catheterization (3/28/2023): Severe 1-vessel CAD involving chronic total occlusion of the proximal LAD  ·  PCI of the ostial/proximal LAD with placement of LUDY (Xience 3 x 28 mm) with post dilation of 4.5 mm proximal         Relevant Medications    metoprolol succinate XL (TOPROL-XL) 25 MG 24 hr tablet      Anginal free and finished with cardiac rehab.  Continue aspirin, Plavix, lisinopril HCTZ, and metoprolol.  Refills provided.  Heart healthy diet and exercise encouraged.       Endocrine and Metabolic    Hypothyroidism - Primary    Relevant Medications    levothyroxine (SYNTHROID, LEVOTHROID) 88 MCG tablet    metoprolol succinate XL (TOPROL-XL) 25 MG 24 hr tablet    Clinically euthyroid, continue levothyroxine at current dose.  We will recheck labs at Medicare wellness in 6 months.   Other Visit Diagnoses     Calcified granuloma of lung        Relevant Orders    CT Chest With Contrast    Discussed CT findings with him today, some new nodules but all are small.  We will repeat CT of the chest in 6 months.  Currently asymptomatic.          Follow Up   Return in about 6 months (around 12/9/2023) for Medicare Wellness.  Patient was given instructions and counseling regarding his condition or for health maintenance advice. Please see specific information pulled into the AVS if appropriate.     SARAH Cuadra  [facility] [place]    Answers submitted by the patient for this visit:  Primary Reason for Visit (Submitted on 6/8/2023)  What is the primary  reason for your visit?: Shortness of Breath  Shortness of Breath Questionnaire (Submitted on 6/8/2023)  Chief Complaint: Shortness of breath  Chronicity: chronic  Onset: more than 1 month ago  Frequency: intermittently  Progression since onset: unchanged  Episode duration: 2 Minutes  claudication: Yes  coryza: No  headaches: No  hemoptysis: No  leg pain: No  orthopnea: No  PND: No  sputum production: No  syncope: No  Aggravating factors: exercise, pollens

## 2023-09-25 ENCOUNTER — LAB (OUTPATIENT)
Dept: LAB | Facility: HOSPITAL | Age: 68
End: 2023-09-25
Payer: MEDICARE

## 2023-09-25 PROCEDURE — 84153 ASSAY OF PSA TOTAL: CPT | Performed by: UROLOGY

## 2023-09-26 NOTE — Clinical Note
Balloon inserted in left anterior descending. Flu shot (high dose)given in right deltoid, pt tolerated well.

## 2023-09-28 ENCOUNTER — OFFICE VISIT (OUTPATIENT)
Dept: UROLOGY | Facility: CLINIC | Age: 68
End: 2023-09-28
Payer: MEDICARE

## 2023-09-28 VITALS
SYSTOLIC BLOOD PRESSURE: 128 MMHG | TEMPERATURE: 97.8 F | OXYGEN SATURATION: 100 % | BODY MASS INDEX: 26.95 KG/M2 | HEART RATE: 64 BPM | WEIGHT: 210 LBS | DIASTOLIC BLOOD PRESSURE: 74 MMHG | HEIGHT: 74 IN

## 2023-09-28 DIAGNOSIS — R97.20 ELEVATED PSA: Primary | ICD-10-CM

## 2023-09-28 RX ORDER — MONTELUKAST SODIUM 10 MG/1
TABLET ORAL
COMMUNITY

## 2023-09-28 NOTE — PROGRESS NOTES
CC  Elevated PSA  LUTS    HPI  Mr. Arnold is a 68 y.o. male with history below in assessment, who presents for follow up.     At this visit IPSS is 8    Past Medical History:   Diagnosis Date    Acid reflux     Benign colon polyp 01/12/2021    Benign prostatic hyperplasia ?    Colon polyp     Colon polyp     Coronary artery disease involving native coronary artery of native heart with refractory angina pectoris 3/20/2023    Erectile dysfunction     Heart murmur     Primary hypertension 12/02/2022    Urinary tract infection ?       Past Surgical History:   Procedure Laterality Date    CARDIAC CATHETERIZATION N/A 3/28/2023    Procedure: Coronary angiography;  Surgeon: Gabe Mcqueen MD;  Location:  [facility];  Service: Cardiology;  Laterality: N/A;    CARDIAC CATHETERIZATION N/A 4/13/2023    Procedure:  PCI of the LAD;  Surgeon: Kevin [place] IV, MD;  Location:  [facility];  Service: Cardiovascular;  Laterality: N/A;    CARDIAC CATHETERIZATION N/A 4/13/2023    Procedure: Optical Coherent Tomography;  Surgeon: Kevin [facility] IV, MD;  Location:  [facility];  Service: Cardiovascular;  Laterality: N/A;    CARDIAC CATHETERIZATION N/A 4/13/2023    Procedure: Left Heart Cath;  Surgeon: Kevin [facility] IV, MD;  Location:  [facility];  Service: Cardiovascular;  Laterality: N/A;    COLONOSCOPY           Current Outpatient Medications:     Aspirin 81 MG capsule, Take 81 mg by mouth Daily., Disp: , Rfl:     clopidogrel (PLAVIX) 75 MG tablet, Take 1 tablet by mouth Daily., Disp: 90 tablet, Rfl: 1    levothyroxine (SYNTHROID, LEVOTHROID) 88 MCG tablet, TAKE 1 TABLET BY MOUTH EVERY DAY, Disp: 90 tablet, Rfl: 3    lisinopril-hydrochlorothiazide (Zestoretic) 10-12.5 MG per tablet, Take 1 tablet by mouth Daily., Disp: 90 tablet, Rfl: 4    loratadine (CLARITIN) 10 MG tablet, Take 1 tablet by mouth Daily., Disp: , Rfl:      "metoprolol succinate XL (TOPROL-XL) 25 MG 24 hr tablet, Take 1 tablet by mouth Daily., Disp: 90 tablet, Rfl: 3    rosuvastatin (CRESTOR) 10 MG tablet, TAKE 1 TABLET BY MOUTH ONCE DAILY, Disp: 90 tablet, Rfl: 3    vitamin B-12 (CYANOCOBALAMIN) 1000 MCG tablet, Take 1 tablet by mouth Daily., Disp: , Rfl:      Physical Exam  Visit Vitals  Ht 188 cm (74.02\")   Wt 95.3 kg (210 lb)   BMI 26.95 kg/m²       Labs  Brief Urine Lab Results       None            Lab Results   Component Value Date    GLUCOSE 96 04/13/2023    CALCIUM 9.5 04/13/2023     04/13/2023    K 4.0 04/13/2023    CO2 23.0 04/13/2023     04/13/2023    BUN 18 04/13/2023    CREATININE 1.10 04/13/2023    EGFRIFAFRI 75 11/19/2021    EGFRIFNONA 62 11/19/2021    BCR 15.9 04/13/2023    ANIONGAP 10.0 04/13/2023       Lab Results   Component Value Date    WBC 7.86 04/13/2023    HGB 14.5 04/13/2023    HCT 42.6 04/13/2023    MCV 87.7 04/13/2023     04/13/2023            Lab Results   Component Value Date    PSA 10.300 (H) 09/25/2023    PSA 7.8 (H) 06/21/2022    PSA 7.630 (H) 05/16/2022             Radiographic Studies  CT Chest With Contrast Diagnostic  Result Date: 6/1/2023  Stable 6 mm or less bilateral noncalcified pulmonary nodules compared to 01/31/2023. Recommend 6 month follow-up chest CT.  Right renal cyst.  This report was signed and finalized on 6/1/2023 3:11 PM by Rayne Parker MD.      I have reviewed above labs and imaging.     Assessment  68 y.o. male with history of elevated PSA.  He has had 3 negative biopsies at this point.  Last MRI was in 2022, and did not demonstrate any PI-RADS 3 or greater lesions.  Prostate volume 146 cc.  PSA does continue to rise in a linear but not exponential fashion, consistent with benign growth.  Minimal LUTS.    Plan  1. Fu in 1 yr w/ PSA prior    "

## 2023-12-04 ENCOUNTER — HOSPITAL ENCOUNTER (OUTPATIENT)
Dept: CT IMAGING | Facility: HOSPITAL | Age: 68
Discharge: HOME | End: 2023-12-04
Admitting: NURSE PRACTITIONER
Payer: MEDICARE

## 2023-12-04 DIAGNOSIS — J84.10 CALCIFIED GRANULOMA OF LUNG: ICD-10-CM

## 2023-12-04 LAB — CREAT BLDA-MCNC: 1.3 MG/DL (ref 0.6–1.3)

## 2023-12-04 PROCEDURE — 25510000001 IOPAMIDOL 61 % SOLUTION: Performed by: NURSE PRACTITIONER

## 2023-12-04 PROCEDURE — 71260 CT THORAX DX C+: CPT

## 2023-12-04 PROCEDURE — 82565 ASSAY OF CREATININE: CPT

## 2023-12-04 RX ADMIN — IOPAMIDOL 95 ML: 612 INJECTION, SOLUTION INTRAVENOUS at 13:25

## 2023-12-13 ENCOUNTER — PATIENT MESSAGE (OUTPATIENT)
Dept: INTERNAL MEDICINE | Facility: CLINIC | Age: 68
End: 2023-12-13

## 2023-12-13 ENCOUNTER — OFFICE VISIT (OUTPATIENT)
Dept: INTERNAL MEDICINE | Facility: CLINIC | Age: 68
End: 2023-12-13
Payer: MEDICARE

## 2023-12-13 VITALS
HEART RATE: 59 BPM | OXYGEN SATURATION: 98 % | BODY MASS INDEX: 27.82 KG/M2 | DIASTOLIC BLOOD PRESSURE: 84 MMHG | HEIGHT: 74 IN | WEIGHT: 216.8 LBS | SYSTOLIC BLOOD PRESSURE: 128 MMHG | TEMPERATURE: 97.8 F

## 2023-12-13 DIAGNOSIS — Z00.00 MEDICARE ANNUAL WELLNESS VISIT, SUBSEQUENT: Primary | ICD-10-CM

## 2023-12-13 DIAGNOSIS — E78.2 MIXED HYPERLIPIDEMIA: ICD-10-CM

## 2023-12-13 DIAGNOSIS — I25.10 CORONARY ARTERY DISEASE INVOLVING NATIVE CORONARY ARTERY OF NATIVE HEART WITHOUT ANGINA PECTORIS: ICD-10-CM

## 2023-12-13 DIAGNOSIS — J84.10 CALCIFIED GRANULOMA OF LUNG: ICD-10-CM

## 2023-12-13 DIAGNOSIS — R91.1 LUNG NODULE: ICD-10-CM

## 2023-12-13 DIAGNOSIS — E53.8 B12 DEFICIENCY: ICD-10-CM

## 2023-12-13 DIAGNOSIS — E03.9 ACQUIRED HYPOTHYROIDISM: ICD-10-CM

## 2023-12-13 LAB
ALBUMIN SERPL-MCNC: 4.6 G/DL (ref 3.5–5.2)
ALBUMIN/GLOB SERPL: 2 G/DL
ALP SERPL-CCNC: 56 U/L (ref 39–117)
ALT SERPL-CCNC: 22 U/L (ref 1–41)
AST SERPL-CCNC: 21 U/L (ref 1–40)
BILIRUB SERPL-MCNC: 0.4 MG/DL (ref 0–1.2)
BUN SERPL-MCNC: 23 MG/DL (ref 8–23)
BUN/CREAT SERPL: 18.3 (ref 7–25)
CALCIUM SERPL-MCNC: 10.2 MG/DL (ref 8.6–10.5)
CHLORIDE SERPL-SCNC: 103 MMOL/L (ref 98–107)
CHOLEST SERPL-MCNC: 179 MG/DL (ref 0–200)
CO2 SERPL-SCNC: 26.4 MMOL/L (ref 22–29)
CREAT SERPL-MCNC: 1.26 MG/DL (ref 0.76–1.27)
EGFRCR SERPLBLD CKD-EPI 2021: 61.4 ML/MIN/1.73
ERYTHROCYTE [DISTWIDTH] IN BLOOD BY AUTOMATED COUNT: 13.4 % (ref 12.3–15.4)
GLOBULIN SER CALC-MCNC: 2.3 GM/DL
GLUCOSE SERPL-MCNC: 99 MG/DL (ref 65–99)
HCT VFR BLD AUTO: 43.5 % (ref 37.5–51)
HDLC SERPL-MCNC: 47 MG/DL (ref 40–60)
HGB BLD-MCNC: 14.6 G/DL (ref 13–17.7)
LDLC SERPL CALC-MCNC: 107 MG/DL (ref 0–100)
MCH RBC QN AUTO: 29.4 PG (ref 26.6–33)
MCHC RBC AUTO-ENTMCNC: 33.6 G/DL (ref 31.5–35.7)
MCV RBC AUTO: 87.7 FL (ref 79–97)
PLATELET # BLD AUTO: 260 10*3/MM3 (ref 140–450)
POTASSIUM SERPL-SCNC: 4.8 MMOL/L (ref 3.5–5.2)
PROT SERPL-MCNC: 6.9 G/DL (ref 6–8.5)
RBC # BLD AUTO: 4.96 10*6/MM3 (ref 4.14–5.8)
SODIUM SERPL-SCNC: 139 MMOL/L (ref 136–145)
TRIGL SERPL-MCNC: 141 MG/DL (ref 0–150)
TSH SERPL DL<=0.005 MIU/L-ACNC: 3.04 UIU/ML (ref 0.27–4.2)
VIT B12 SERPL-MCNC: 495 PG/ML (ref 211–946)
VLDLC SERPL CALC-MCNC: 25 MG/DL (ref 5–40)
WBC # BLD AUTO: 7.73 10*3/MM3 (ref 3.4–10.8)

## 2023-12-13 PROCEDURE — G0439 PPPS, SUBSEQ VISIT: HCPCS | Performed by: NURSE PRACTITIONER

## 2023-12-13 PROCEDURE — 1159F MED LIST DOCD IN RCRD: CPT | Performed by: NURSE PRACTITIONER

## 2023-12-13 PROCEDURE — 3074F SYST BP LT 130 MM HG: CPT | Performed by: NURSE PRACTITIONER

## 2023-12-13 PROCEDURE — 1160F RVW MEDS BY RX/DR IN RCRD: CPT | Performed by: NURSE PRACTITIONER

## 2023-12-13 PROCEDURE — 1170F FXNL STATUS ASSESSED: CPT | Performed by: NURSE PRACTITIONER

## 2023-12-13 PROCEDURE — 3079F DIAST BP 80-89 MM HG: CPT | Performed by: NURSE PRACTITIONER

## 2023-12-13 NOTE — PROGRESS NOTES
The ABCs of the Annual Wellness Visit  Subsequent Medicare Wellness Visit    Subjective      Carmelo Arnold is a 68 y.o. male who presents for a Subsequent Medicare Wellness Visit.  Here today for medicare wellness with no acute complaints.   HTN- compliant with lisinopril-HCTZ. Denies chest pain, shortness of breath, orthopnea, lower extremity swelling, and dizziness.   HLD- compliant with rosuvastatin. Denies dark urine and myalgia. Due for labs today. Weight is stable.   CAD- recent stenting in April 2023 to LAD. Did 6 months of dual antiplatelet therapy and was instructed to discontinue clopidogrel. He remains on aspirin and anginal free. He is compliant with beta blockade therapy.   Hypothyroidism- compliant with levothyroxine. Denies heat/cold intolerance, changes in bowel habits, or new fatigue. Does have trouble concentrating at times.   Recently has been experiencing pruritus of both hips, usually in AM . No rash. Happened about 2 months ago.   Had  repeat CT chest showing stable lung nodules. Was advised to repeat in 6 months .     The following portions of the patient's history were reviewed and   updated as appropriate: allergies, current medications, past family history, past medical history, past social history, past surgical history, and problem list.    Compared to one year ago, the patient feels his physical   health is the same.    Compared to one year ago, the patient feels his mental   health is worse.    Recent Hospitalizations:  He was not admitted to the hospital during the last year.       Current Medical Providers:  Patient Care Team:  Hortensia Huynh APRN as PCP - General (Nurse Practitioner)  Negrita Herrera MD as Consulting Physician (General Surgery)  Negrita Herrera MD as Consulting Physician (General Surgery)  Gabe Mcqueen MD as Consulting Physician (Cardiology)    Outpatient Medications Prior to Visit   Medication Sig Dispense Refill    Aspirin 81 MG capsule Take 81 mg by  mouth Daily.      levothyroxine (SYNTHROID, LEVOTHROID) 88 MCG tablet TAKE 1 TABLET BY MOUTH EVERY DAY 90 tablet 3    lisinopril-hydrochlorothiazide (Zestoretic) 10-12.5 MG per tablet Take 1 tablet by mouth Daily. 90 tablet 4    loratadine (CLARITIN) 10 MG tablet Take 1 tablet by mouth Daily.      metoprolol succinate XL (TOPROL-XL) 25 MG 24 hr tablet Take 1 tablet by mouth Daily. 90 tablet 3    rosuvastatin (CRESTOR) 10 MG tablet TAKE 1 TABLET BY MOUTH ONCE DAILY 90 tablet 3    vitamin B-12 (CYANOCOBALAMIN) 1000 MCG tablet Take 1 tablet by mouth Daily.      clopidogrel (PLAVIX) 75 MG tablet Take 1 tablet by mouth Daily. 90 tablet 1    montelukast (SINGULAIR) 10 MG tablet        No facility-administered medications prior to visit.       No opioid medication identified on active medication list. I have reviewed chart for other potential  high risk medication/s and harmful drug interactions in the elderly.        Aspirin is on active medication list. Aspirin use is indicated based on review of current medical condition/s. Pros and cons of this therapy have been discussed today. Benefits of this medication outweigh potential harm.  Patient has been encouraged to continue taking this medication.  .      Patient Active Problem List   Diagnosis    Vitamin D deficiency    Benign prostatic hyperplasia with urinary obstruction    Hypothyroidism    Dyslipidemia    B12 deficiency    PAC (premature atrial contraction)    Ventral hernia    Benign colon polyp    Chronic pain of right knee    Depression    Primary hypertension    Impotence of organic origin    Chronic prostatitis    Incomplete emptying of bladder    Induration penis plastica    Coronary artery disease involving native coronary artery of native heart without angina pectoris     Advance Care Planning   Advance Care Planning     Advance Directive is not on file.  ACP discussion was held with the patient during this visit. Patient does not have an advance directive,  "information provided.     Objective    Vitals:    12/13/23 0812   BP: 148/87   BP Location: Right arm   Pulse: 59   Temp: 97.8 °F (36.6 °C)   TempSrc: Tympanic   SpO2: 98%   Weight: 98.3 kg (216 lb 12.8 oz)   Height: 188 cm (74.02\")   PainSc: 0-No pain     Estimated body mass index is 27.82 kg/m² as calculated from the following:    Height as of this encounter: 188 cm (74.02\").    Weight as of this encounter: 98.3 kg (216 lb 12.8 oz).           Does the patient have evidence of cognitive impairment?   No    Physical Exam  Vitals and nursing note reviewed.   Constitutional:       General: He is not in acute distress.     Appearance: Normal appearance. He is not ill-appearing.   HENT:      Right Ear: Tympanic membrane and ear canal normal.      Left Ear: Tympanic membrane and ear canal normal.      Nose: Nose normal.      Mouth/Throat:      Mouth: Mucous membranes are moist.      Pharynx: No posterior oropharyngeal erythema.   Eyes:      Extraocular Movements: Extraocular movements intact.      Right eye: No nystagmus.      Left eye: No nystagmus.      Conjunctiva/sclera: Conjunctivae normal.      Pupils: Pupils are equal, round, and reactive to light.   Neck:      Thyroid: No thyroid mass or thyromegaly.      Vascular: No carotid bruit.   Cardiovascular:      Rate and Rhythm: Normal rate and regular rhythm.      Pulses: Normal pulses.      Heart sounds: Normal heart sounds. No murmur heard.  Pulmonary:      Effort: Pulmonary effort is normal.      Breath sounds: Normal breath sounds. No wheezing.   Abdominal:      General: Bowel sounds are normal. There is no distension.      Palpations: Abdomen is soft.      Tenderness: There is no abdominal tenderness.      Hernia: No hernia is present.   Musculoskeletal:         General: Deformity present. No tenderness.      Cervical back: Normal range of motion and neck supple. No muscular tenderness.      Right lower leg: No edema.      Left lower leg: No edema. "   Lymphadenopathy:      Head:      Right side of head: No submandibular, tonsillar, preauricular or posterior auricular adenopathy.      Left side of head: No submandibular, tonsillar, preauricular or posterior auricular adenopathy.      Cervical: No cervical adenopathy.   Skin:     General: Skin is warm and dry.      Capillary Refill: Capillary refill takes less than 2 seconds.      Findings: No lesion or rash.   Neurological:      General: No focal deficit present.      Mental Status: He is alert and oriented to person, place, and time.      Coordination: Coordination is intact.      Gait: Gait is intact.      Deep Tendon Reflexes: Reflexes normal.   Psychiatric:         Mood and Affect: Mood normal.         Behavior: Behavior normal.         HEALTH RISK ASSESSMENT    Smoking Status:  Social History     Tobacco Use   Smoking Status Never    Passive exposure: Past   Smokeless Tobacco Never     Alcohol Consumption:  Social History     Substance and Sexual Activity   Alcohol Use Yes    Comment: rarely drink     Fall Risk Screen:    [facility] Fall Risk Assessment was completed, and patient is at LOW risk for falls.Assessment completed on:2023    Depression Screenin/13/2023     8:14 AM   PHQ-2/PHQ-9 Depression Screening   Little Interest or Pleasure in Doing Things 1-->several days   Feeling Down, Depressed or Hopeless 1-->several days   Trouble Falling or Staying Asleep, or Sleeping Too Much 1-->several days   Feeling Tired or Having Little Energy 2-->more than half the days   Poor Appetite or Overeating 0-->not at all   Feeling Bad about Yourself - or that You are a Failure or Have Let Yourself or Your Family Down 0-->not at all   Trouble Concentrating on Things, Such as Reading the Newspaper or Watching Television 1-->several days   Moving or Speaking So Slowly that Other People Could Have Noticed? Or the Opposite - Being So Fidgety 0-->not at all   Thoughts that You Would be Better Off Dead or of  Hurting Yourself in Some Way 0-->not at all   PHQ-9: Brief Depression Severity Measure Score 6   If You Checked Off Any Problems, How Difficult Have These Problems Made It For You to Do Your Work, Take Care of Things at Home, or Get Along with Other People? not difficult at all       Health Habits and Functional and Cognitive Screenin/13/2023     8:17 AM   Functional & Cognitive Status   Do you have difficulty preparing food and eating? No   Do you have difficulty bathing yourself, getting dressed or grooming yourself? No   Do you have difficulty using the toilet? No   Do you have difficulty moving around from place to place? No   Do you have trouble with steps or getting out of a bed or a chair? No   Current Diet Well Balanced Diet   Dental Exam Up to date   Eye Exam Up to date   Exercise (times per week) 0 times per week   Current Exercises Include No Regular Exercise   Do you need help using the phone?  No   Are you deaf or do you have serious difficulty hearing?  Yes   Do you need help to go to places out of walking distance? No   Do you need help shopping? No   Do you need help preparing meals?  No   Do you need help with housework?  No   Do you need help with laundry? No   Do you need help taking your medications? No   Do you need help managing money? No   Do you ever drive or ride in a car without wearing a seat belt? No       Age-appropriate Screening Schedule:  Refer to the list below for future screening recommendations based on patient's age, sex and/or medical conditions. Orders for these recommended tests are listed in the plan section. The patient has been provided with a written plan.    Health Maintenance   Topic Date Due    COVID-19 Vaccine (3 - 2023- season) 2023    ANNUAL WELLNESS VISIT  2023    INFLUENZA VACCINE  2024 (Originally 2023)    ZOSTER VACCINE (1 of 2) 2024 (Originally 2003)    LIPID PANEL  2024    BMI FOLLOWUP  2024    TDAP/TD  VACCINES (2 - Td or Tdap) 05/19/2026    COLORECTAL CANCER SCREENING  02/26/2030    HEPATITIS C SCREENING  Completed    Pneumococcal Vaccine 65+  Completed                  CMS Preventative Services Quick Reference  Risk Factors Identified During Encounter:  Inactivity/Sedentary: Patient was advised to exercise at least 150 minutes a week per CDC recommendations.  Vision Screening Recommended    The above risks/problems have been discussed with the patient.  Pertinent information has been shared with the patient in the After Visit Summary.    Diagnoses and all orders for this visit:    1. Medicare annual wellness visit, subsequent (Primary)  Preventative maintenance discussed during visit and information provided in AVS.   2. Acquired hypothyroidism  -     CBC No Differential  -     Comprehensive metabolic panel  -     Lipid panel  -     TSH Rfx On Abnormal To Free T4  -     Vitamin B12  Update labs. Clinically euthyroid, titrate medication as indicated.   3. Coronary artery disease involving native coronary artery of native heart without angina pectoris  -     CBC No Differential  -     Comprehensive metabolic panel  -     Lipid panel  -     TSH Rfx On Abnormal To Free T4  -     Vitamin B12  Anginal free. Continue statin, aspirin, beta blocker. Follow-up with cardiology as scheduled.   4. Mixed hyperlipidemia  -     CBC No Differential  -     Comprehensive metabolic panel  -     Lipid panel  -     TSH Rfx On Abnormal To Free T4  -     Vitamin B12  Update lipid panel. Continue high intensity statin therapy.   5. B12 deficiency  -     CBC No Differential  -     Comprehensive metabolic panel  -     Lipid panel  -     TSH Rfx On Abnormal To Free T4  -     Vitamin B12  Check labs.   6. Calcified granuloma of lung  7. Lung nodule  Results reviewed, stable findings. Repeat in 6 months, if enlarging will       Follow Up:   Next Medicare Wellness visit to be scheduled in 1 year.    Chronic disease management follow-up 6  months.   An After Visit Summary and PPPS were made available to the patient.

## 2023-12-13 NOTE — TELEPHONE ENCOUNTER
From: Carmelo Arnold  To: Hortensia Huynh  Sent: 12/13/2023 11:13 AM EST  Subject: Shingles    We talked about Shingles, I meant to ask if I should get the shingles vaccination. If so, I should do it before the end of the year before my deductible resets.

## 2024-01-18 DIAGNOSIS — I10 PRIMARY HYPERTENSION: ICD-10-CM

## 2024-01-18 RX ORDER — LISINOPRIL AND HYDROCHLOROTHIAZIDE 12.5; 1 MG/1; MG/1
1 TABLET ORAL DAILY
Qty: 90 TABLET | Refills: 2 | Status: SHIPPED | OUTPATIENT
Start: 2024-01-18

## 2024-04-30 DIAGNOSIS — E78.5 HYPERLIPIDEMIA, UNSPECIFIED HYPERLIPIDEMIA TYPE: ICD-10-CM

## 2024-04-30 RX ORDER — ROSUVASTATIN CALCIUM 10 MG/1
10 TABLET, COATED ORAL DAILY
Qty: 90 TABLET | Refills: 3 | Status: SHIPPED | OUTPATIENT
Start: 2024-04-30

## 2024-05-07 NOTE — PROGRESS NOTES
"             [facility] Office Follow Up Note    Carmelo Arnold  [id number]  05/10/2024    Primary Care Provider: Hortensia Huynh APRN   Referring Provider: No ref. provider found    Chief Complaint: 1yr F/U CAD    History of Present Illness:   Mr. Carmelo Arnold is a 68 y.o. male who presents to the Cardiology Clinic for routine follow-up.  The patient has a past medical history of coronary artery disease, hypertension and hyperlipidemia.  He presents today for follow-up.  The patient reports feeling well from a cardiac standpoint today.  He does report some mild shortness of breath with exertion but attributes this to being \"out of shape\".  He wants to know how long he will have to take medication for his heart.  He specifically denies chest pain and exertional angina.  He denies palpitations, dizziness, syncope.  He denies lower extremity edema.  He continues to take his medications as prescribed without perceived side effects.  He offers no other complaints or concerns at this time.         Past Cardiac Testin. Last Coronary Angio: 2023    Successful chronic total occlusion PCI of the ostial/proximal LAD with placement of a LUDY (Xience 3 x 28 mm) with post dilation of 4.5 mm proximally.    Normal LV filling pressure (LVEDP 9 mmHg)  2. Prior Stress Testin2022    The clinician observed no symptoms during the stress test.    The patient reported chest pain, shortness of breath and non-exercise-limiting angina during the stress test.    Arrhythmias were not significant during stress.  No objective evidence of ischemia at cardiac workload achieved.  3. Last Echo: 2022    Left ventricular systolic function is normal. Left ventricular ejection fraction appears to be 61 - 65%.    Left ventricular wall thickness is consistent with mild concentric hypertrophy.    Left ventricular diastolic function was normal.    Estimated right ventricular systolic pressure from " "tricuspid regurgitation is normal (<35 mmHg). Calculated right ventricular systolic pressure from tricuspid regurgitation is 14 mmHg.  4. Prior Holter Monitor: 8/28/2019 Remote (scanned into chart)      Review of Systems:   Review of Systems  As Noted in HPI.   I have reviewed and confirmed the accuracy of the ROS as documented by the MA/LPN/RN SARAH Nichols    I have reviewed and/or updated the patient's past medical, past surgical, family, social history, problem list and allergies as appropriate.     Medications:     Current Outpatient Medications:     Aspirin 81 MG capsule, Take 81 mg by mouth Daily., Disp: , Rfl:     levothyroxine (SYNTHROID, LEVOTHROID) 88 MCG tablet, TAKE 1 TABLET BY MOUTH EVERY DAY, Disp: 90 tablet, Rfl: 3    lisinopril-hydrochlorothiazide (PRINZIDE,ZESTORETIC) 10-12.5 MG per tablet, TAKE 1 TABLET BY MOUTH DAILY, Disp: 90 tablet, Rfl: 2    loratadine (CLARITIN) 10 MG tablet, Take 1 tablet by mouth Daily., Disp: , Rfl:     metoprolol succinate XL (TOPROL-XL) 25 MG 24 hr tablet, Take 1 tablet by mouth Daily., Disp: 90 tablet, Rfl: 3    rosuvastatin (CRESTOR) 10 MG tablet, Take 1 tablet by mouth Daily., Disp: 90 tablet, Rfl: 3    vitamin B-12 (CYANOCOBALAMIN) 1000 MCG tablet, Take 1 tablet by mouth Daily., Disp: , Rfl:     Physical Exam:  Vital Signs:   Vitals:    05/10/24 1019   BP: 134/84   BP Location: Right arm   Patient Position: Sitting   Cuff Size: Adult   Pulse: 54   Resp: 16   Temp: 97.7 °F (36.5 °C)   TempSrc: Infrared   SpO2: 98%  Comment: RA   Weight: 95.7 kg (211 lb)   Height: 188 cm (74\")     Body mass index is 27.09 kg/m².    Physical Exam  Vitals and nursing note reviewed.   Constitutional:       General: He is not in acute distress.  HENT:      Head: Normocephalic and atraumatic.   Neck:      Trachea: Trachea normal.   Cardiovascular:      Rate and Rhythm: Normal rate and regular rhythm.      Pulses: Normal pulses.      Heart sounds: Normal heart sounds. No murmur " heard.     No friction rub. No gallop.   Pulmonary:      Effort: Pulmonary effort is normal.      Breath sounds: Normal breath sounds.   Musculoskeletal:      Cervical back: Neck supple.      Right lower leg: No edema.      Left lower leg: No edema.   Skin:     General: Skin is warm and dry.   Neurological:      Mental Status: He is alert and oriented to person, place, and time.   Psychiatric:         Mood and Affect: Mood normal.         Behavior: Behavior normal. Behavior is cooperative.         Thought Content: Thought content does not include suicidal ideation.     Results Review:   I reviewed the patient's new clinical results.    Procedures    Assessment / Plan:   Diagnoses and all orders for this visit:    1. Coronary artery disease involving native coronary artery of native heart without angina pectoris (Primary)  Patient advised to call our office with any worsening dyspnea, especially with exertion  Continue daily aspirin indefinitely  Continue statin therapy as long as tolerated    2. Primary hypertension  Acceptable blood pressure on current antihypertensives    3. Dyslipidemia  LDL goal < 70 mg/dL  12/23,   Patient declines uptitration of statin therapy today; he says he will address this with PCP at his routine follow-up next month    Preventative Cardiology:   Tobacco Cessation: N/A   Advance Care Planning: ACP discussion was declined by the patient. Patient does not have an advance directive, declines further assistance.     Follow Up:   Return in about 1 year (around 5/10/2025) for Follow-up with Dr. Mcqueen.      Thank you for allowing me to participate in the care of your patient. Please do not hesitate to contact me with additional questions or concerns.     SARAH Ratliff

## 2024-05-10 ENCOUNTER — OFFICE VISIT (OUTPATIENT)
Dept: CARDIOLOGY | Facility: CLINIC | Age: 69
End: 2024-05-10
Payer: MEDICARE

## 2024-05-10 VITALS
RESPIRATION RATE: 16 BRPM | SYSTOLIC BLOOD PRESSURE: 134 MMHG | BODY MASS INDEX: 27.08 KG/M2 | DIASTOLIC BLOOD PRESSURE: 84 MMHG | TEMPERATURE: 97.7 F | WEIGHT: 211 LBS | HEIGHT: 74 IN | HEART RATE: 54 BPM | OXYGEN SATURATION: 98 %

## 2024-05-10 DIAGNOSIS — I10 PRIMARY HYPERTENSION: ICD-10-CM

## 2024-05-10 DIAGNOSIS — E78.5 DYSLIPIDEMIA: ICD-10-CM

## 2024-05-10 DIAGNOSIS — I25.10 CORONARY ARTERY DISEASE INVOLVING NATIVE CORONARY ARTERY OF NATIVE HEART WITHOUT ANGINA PECTORIS: Primary | ICD-10-CM

## 2024-05-10 PROCEDURE — 3075F SYST BP GE 130 - 139MM HG: CPT | Performed by: NURSE PRACTITIONER

## 2024-05-10 PROCEDURE — 1159F MED LIST DOCD IN RCRD: CPT | Performed by: NURSE PRACTITIONER

## 2024-05-10 PROCEDURE — 99214 OFFICE O/P EST MOD 30 MIN: CPT | Performed by: NURSE PRACTITIONER

## 2024-05-10 PROCEDURE — 1160F RVW MEDS BY RX/DR IN RCRD: CPT | Performed by: NURSE PRACTITIONER

## 2024-05-10 PROCEDURE — 3079F DIAST BP 80-89 MM HG: CPT | Performed by: NURSE PRACTITIONER

## 2024-05-28 DIAGNOSIS — E03.9 HYPOTHYROIDISM, UNSPECIFIED TYPE: ICD-10-CM

## 2024-05-28 RX ORDER — METOPROLOL SUCCINATE 25 MG/1
25 TABLET, EXTENDED RELEASE ORAL DAILY
Qty: 90 TABLET | Refills: 3 | Status: SHIPPED | OUTPATIENT
Start: 2024-05-28

## 2024-05-28 RX ORDER — LEVOTHYROXINE SODIUM 88 UG/1
TABLET ORAL
Qty: 90 TABLET | Refills: 3 | Status: SHIPPED | OUTPATIENT
Start: 2024-05-28

## 2024-06-10 ENCOUNTER — HOSPITAL ENCOUNTER (OUTPATIENT)
Dept: CT IMAGING | Facility: HOSPITAL | Age: 69
Discharge: HOME | End: 2024-06-10
Admitting: NURSE PRACTITIONER
Payer: MEDICARE

## 2024-06-10 DIAGNOSIS — R91.1 LUNG NODULE: ICD-10-CM

## 2024-06-10 DIAGNOSIS — J84.10 CALCIFIED GRANULOMA OF LUNG: ICD-10-CM

## 2024-06-10 LAB — CREAT BLDA-MCNC: 1.6 MG/DL (ref 0.6–1.3)

## 2024-06-10 PROCEDURE — 71260 CT THORAX DX C+: CPT

## 2024-06-10 PROCEDURE — 82565 ASSAY OF CREATININE: CPT

## 2024-06-10 PROCEDURE — 25510000001 IOPAMIDOL 61 % SOLUTION: Performed by: NURSE PRACTITIONER

## 2024-06-10 RX ADMIN — IOPAMIDOL 95 ML: 612 INJECTION, SOLUTION INTRAVENOUS at 15:58

## 2024-06-13 ENCOUNTER — OFFICE VISIT (OUTPATIENT)
Dept: INTERNAL MEDICINE | Facility: CLINIC | Age: 69
End: 2024-06-13
Payer: MEDICARE

## 2024-06-13 VITALS
BODY MASS INDEX: 27.57 KG/M2 | OXYGEN SATURATION: 100 % | HEART RATE: 87 BPM | HEIGHT: 74 IN | DIASTOLIC BLOOD PRESSURE: 70 MMHG | WEIGHT: 214.8 LBS | TEMPERATURE: 97.3 F | SYSTOLIC BLOOD PRESSURE: 120 MMHG

## 2024-06-13 DIAGNOSIS — R06.09 DYSPNEA ON EXERTION: ICD-10-CM

## 2024-06-13 DIAGNOSIS — J84.10 CALCIFIED GRANULOMA OF LUNG: ICD-10-CM

## 2024-06-13 DIAGNOSIS — I25.10 CORONARY ARTERY DISEASE INVOLVING NATIVE CORONARY ARTERY OF NATIVE HEART WITHOUT ANGINA PECTORIS: Primary | ICD-10-CM

## 2024-06-13 DIAGNOSIS — E03.9 ACQUIRED HYPOTHYROIDISM: ICD-10-CM

## 2024-06-13 DIAGNOSIS — E78.5 HYPERLIPIDEMIA, UNSPECIFIED HYPERLIPIDEMIA TYPE: ICD-10-CM

## 2024-06-13 PROCEDURE — 1126F AMNT PAIN NOTED NONE PRSNT: CPT | Performed by: NURSE PRACTITIONER

## 2024-06-13 PROCEDURE — 3078F DIAST BP <80 MM HG: CPT | Performed by: NURSE PRACTITIONER

## 2024-06-13 PROCEDURE — 1159F MED LIST DOCD IN RCRD: CPT | Performed by: NURSE PRACTITIONER

## 2024-06-13 PROCEDURE — 99214 OFFICE O/P EST MOD 30 MIN: CPT | Performed by: NURSE PRACTITIONER

## 2024-06-13 PROCEDURE — 3074F SYST BP LT 130 MM HG: CPT | Performed by: NURSE PRACTITIONER

## 2024-06-13 PROCEDURE — 1160F RVW MEDS BY RX/DR IN RCRD: CPT | Performed by: NURSE PRACTITIONER

## 2024-06-13 PROCEDURE — G2211 COMPLEX E/M VISIT ADD ON: HCPCS | Performed by: NURSE PRACTITIONER

## 2024-06-13 RX ORDER — ALBUTEROL SULFATE 90 UG/1
2 AEROSOL, METERED RESPIRATORY (INHALATION) EVERY 4 HOURS PRN
Qty: 8 G | Refills: 3 | Status: SHIPPED | OUTPATIENT
Start: 2024-06-13

## 2024-06-13 RX ORDER — ZOSTER VACCINE RECOMBINANT, ADJUVANTED 50 MCG/0.5
0.5 KIT INTRAMUSCULAR ONCE
Qty: 1 EACH | Refills: 0 | Status: SHIPPED | OUTPATIENT
Start: 2024-06-13 | End: 2024-06-13

## 2024-06-13 NOTE — PROGRESS NOTES
Office Visit      Patient Name: Carmelo Arnold  : 1955   MRN: [id number]   Care Team: Patient Care Team:  Hortensia Huynh APRN as PCP - General (Family Medicine)  Negrita Herrera MD as Consulting Physician (General Surgery)  Negrita Herrera MD as Consulting Physician (General Surgery)  Gabe Mcqueen MD as Consulting Physician (Cardiology)    Chief Complaint  Follow-up (R/T findings on xray of lungs. )    Subjective     Subjective      Carmelo Arnold presents to [facility] for chronic disease management.  Suffers from hypertension, BPH, hyperlipidemia, CAD, and hypothyroidism.  Hypertension-he is taking lisinopril HCTZ and metoprolol as prescribed.  Denies chest pain, orthopnea, lower extremity swelling, dizziness, fatigue, and hypotension.  He does not consistently monitor his blood pressure at home.  He does continue to complain of some dyspnea on exertion, only with vigorous activity.  He has chronic numbness of bilateral great toes, this is not worsening and has been present for at least 2 years.  No pain associated or low back pain.  He has been getting CT scans of the lungs every 6 months to follow-up on pulmonary nodules, thus far have been stable.  Read is still pending on most recent.  No hemoptysis, wheezing, or unexpected weight loss.  CAD-he is compliant with aspirin and rosuvastatin.  Lipid panel is UTD.  He is followed by cardiology.  Lab Results   Component Value Date    CHOL 194 2023    CHLPL 179 2023    TRIG 141 2023    HDL 47 2023     (H) 2023     Hypothyroidism-   Lab Results   Component Value Date    TSH 3.040 2023     He is taking levothyroxine as prescribed.  Denies temperature intolerance, constipation, palpitations, or dysphagia.  He follows with urology regarding BPH.  Symptoms feel stable.  He has gotten the first Shingrix and is due for second.    Objective     Objective   Vital Signs:   BP  "120/70   Pulse 87   Temp 97.3 °F (36.3 °C) (Tympanic)   Ht 188 cm (74\")   Wt 97.4 kg (214 lb 12.8 oz)   SpO2 100%   BMI 27.58 kg/m²     Physical Exam  Vitals and nursing note reviewed.   Constitutional:       General: He is not in acute distress.     Appearance: Normal appearance. He is not toxic-appearing.   Eyes:      Pupils: Pupils are equal, round, and reactive to light.   Neck:      Vascular: No carotid bruit.   Cardiovascular:      Rate and Rhythm: Normal rate and regular rhythm.      Pulses:           Posterior tibial pulses are 2+ on the right side and 2+ on the left side.      Heart sounds: Normal heart sounds. No murmur heard.  Pulmonary:      Effort: Pulmonary effort is normal. No respiratory distress.      Breath sounds: Normal breath sounds. No wheezing.   Abdominal:      General: Bowel sounds are normal. There is no distension.      Palpations: Abdomen is soft.      Tenderness: There is no abdominal tenderness.   Musculoskeletal:         General: Deformity present.      Cervical back: Neck supple. No tenderness.      Right lower leg: No edema.      Left lower leg: No edema.   Skin:     General: Skin is warm and dry.      Findings: No rash.   Neurological:      General: No focal deficit present.      Mental Status: He is alert and oriented to person, place, and time.   Psychiatric:         Mood and Affect: Mood normal.         Behavior: Behavior normal.        Assessment / Plan      Assessment & Plan   Problem List Items Addressed This Visit       Hypothyroidism    Relevant Orders    Basic metabolic panel    Clinically euthyroid, continue levothyroxine at current dose.    Coronary artery disease involving native coronary artery of native heart without angina pectoris - Primary    Overview     GXT (12/5/2023): Exercised for 3.5 minutes with development of chest pain, shortness of breath  Echo (12/20/2022): LVEF 62%.  No significant valve abnormality  Cardiac catheterization (3/28/2023): Severe " 1-vessel CAD involving chronic total occlusion of the proximal LAD   PCI of the ostial/proximal LAD with placement of LUDY (Xience 3 x 28 mm) with post dilation of 4.5 mm proximal         Relevant Orders    Basic metabolic panel    Management cardiology, currently anginal free.  Continue aspirin, statin, and beta-blocker.  Mediterranean diet, stay active with moderate intensity exercise 4-5 times per week.     Other Visit Diagnoses       Hyperlipidemia, unspecified hyperlipidemia type        Relevant Orders    Basic metabolic panel    Lipid panel UTD, work on Mediterranean diet and continue rosuvastatin.    Calcified granuloma of lung        Relevant Medications    albuterol sulfate  (90 Base) MCG/ACT inhaler    Other Relevant Orders    Basic metabolic panel    Awaiting CT results, if any change will refer to pulmonary.    Dyspnea on exertion        Still complaining of ROSS on exertion, differentials include cardiac origin versus pulmonary.  Awaiting CT results and refer to pulmonary if indicated.  Will provide albuterol as needed to see if this helps.  Discussed red flags with him today.            Follow Up   Return in about 6 months (around 12/13/2024) for Medicare Wellness.  Patient was given instructions and counseling regarding his condition or for health maintenance advice. Please see specific information pulled into the AVS if appropriate.     SARAH Cuadra  [facility]

## 2024-08-10 ENCOUNTER — HOSPITAL ENCOUNTER (OUTPATIENT)
Facility: HOSPITAL | Age: 69
Setting detail: OBSERVATION
Discharge: HOME | End: 2024-08-11
Attending: STUDENT IN AN ORGANIZED HEALTH CARE EDUCATION/TRAINING PROGRAM | Admitting: FAMILY MEDICINE
Payer: MEDICARE

## 2024-08-10 ENCOUNTER — APPOINTMENT (OUTPATIENT)
Dept: GENERAL RADIOLOGY | Facility: HOSPITAL | Age: 69
End: 2024-08-10
Payer: MEDICARE

## 2024-08-10 ENCOUNTER — APPOINTMENT (OUTPATIENT)
Dept: CT IMAGING | Facility: HOSPITAL | Age: 69
End: 2024-08-10
Payer: MEDICARE

## 2024-08-10 DIAGNOSIS — I26.99 ACUTE PULMONARY EMBOLISM, UNSPECIFIED PULMONARY EMBOLISM TYPE, UNSPECIFIED WHETHER ACUTE COR PULMONALE PRESENT: Primary | ICD-10-CM

## 2024-08-10 LAB
ALBUMIN SERPL-MCNC: 3.9 G/DL (ref 3.5–5.2)
ALBUMIN/GLOB SERPL: 1.2 G/DL
ALP SERPL-CCNC: 62 U/L (ref 39–117)
ALT SERPL W P-5'-P-CCNC: 21 U/L (ref 1–41)
ANION GAP SERPL CALCULATED.3IONS-SCNC: 15.3 MMOL/L (ref 5–15)
APTT PPP: 23.6 SECONDS (ref 70–100)
AST SERPL-CCNC: 18 U/L (ref 1–40)
BACTERIA UR QL AUTO: ABNORMAL /HPF
BASOPHILS # BLD AUTO: 0.07 10*3/MM3 (ref 0–0.2)
BASOPHILS # BLD AUTO: 0.08 10*3/MM3 (ref 0–0.2)
BASOPHILS NFR BLD AUTO: 0.6 % (ref 0–1.5)
BASOPHILS NFR BLD AUTO: 0.6 % (ref 0–1.5)
BILIRUB SERPL-MCNC: 0.3 MG/DL (ref 0–1.2)
BILIRUB UR QL STRIP: NEGATIVE
BUN SERPL-MCNC: 22 MG/DL (ref 8–23)
BUN/CREAT SERPL: 16.5 (ref 7–25)
CALCIUM SPEC-SCNC: 8.8 MG/DL (ref 8.6–10.5)
CHLORIDE SERPL-SCNC: 102 MMOL/L (ref 98–107)
CLARITY UR: CLEAR
CO2 SERPL-SCNC: 23.7 MMOL/L (ref 22–29)
COLOR UR: YELLOW
CREAT SERPL-MCNC: 1.33 MG/DL (ref 0.76–1.27)
DEPRECATED RDW RBC AUTO: 44.4 FL (ref 37–54)
DEPRECATED RDW RBC AUTO: 45.1 FL (ref 37–54)
EGFRCR SERPLBLD CKD-EPI 2021: 57.5 ML/MIN/1.73
EOSINOPHIL # BLD AUTO: 0.42 10*3/MM3 (ref 0–0.4)
EOSINOPHIL # BLD AUTO: 0.43 10*3/MM3 (ref 0–0.4)
EOSINOPHIL NFR BLD AUTO: 3.4 % (ref 0.3–6.2)
EOSINOPHIL NFR BLD AUTO: 3.6 % (ref 0.3–6.2)
ERYTHROCYTE [DISTWIDTH] IN BLOOD BY AUTOMATED COUNT: 14.1 % (ref 12.3–15.4)
ERYTHROCYTE [DISTWIDTH] IN BLOOD BY AUTOMATED COUNT: 14.1 % (ref 12.3–15.4)
GLOBULIN UR ELPH-MCNC: 3.3 GM/DL
GLUCOSE SERPL-MCNC: 165 MG/DL (ref 65–99)
GLUCOSE UR STRIP-MCNC: NEGATIVE MG/DL
HCT VFR BLD AUTO: 36.9 % (ref 37.5–51)
HCT VFR BLD AUTO: 39.8 % (ref 37.5–51)
HGB BLD-MCNC: 12.7 G/DL (ref 13–17.7)
HGB BLD-MCNC: 13.6 G/DL (ref 13–17.7)
HGB UR QL STRIP.AUTO: NEGATIVE
HOLD SPECIMEN: NORMAL
HOLD SPECIMEN: NORMAL
HYALINE CASTS UR QL AUTO: ABNORMAL /LPF
IMM GRANULOCYTES # BLD AUTO: 0.15 10*3/MM3 (ref 0–0.05)
IMM GRANULOCYTES # BLD AUTO: 0.19 10*3/MM3 (ref 0–0.05)
IMM GRANULOCYTES NFR BLD AUTO: 1.2 % (ref 0–0.5)
IMM GRANULOCYTES NFR BLD AUTO: 1.6 % (ref 0–0.5)
INR PPP: 1.15 (ref 0.9–1.1)
KETONES UR QL STRIP: ABNORMAL
LEUKOCYTE ESTERASE UR QL STRIP.AUTO: ABNORMAL
LYMPHOCYTES # BLD AUTO: 2.08 10*3/MM3 (ref 0.7–3.1)
LYMPHOCYTES # BLD AUTO: 2.55 10*3/MM3 (ref 0.7–3.1)
LYMPHOCYTES NFR BLD AUTO: 17.3 % (ref 19.6–45.3)
LYMPHOCYTES NFR BLD AUTO: 20.5 % (ref 19.6–45.3)
MAGNESIUM SERPL-MCNC: 2.3 MG/DL (ref 1.6–2.4)
MCH RBC QN AUTO: 29.7 PG (ref 26.6–33)
MCH RBC QN AUTO: 29.8 PG (ref 26.6–33)
MCHC RBC AUTO-ENTMCNC: 34.2 G/DL (ref 31.5–35.7)
MCHC RBC AUTO-ENTMCNC: 34.4 G/DL (ref 31.5–35.7)
MCV RBC AUTO: 86.6 FL (ref 79–97)
MCV RBC AUTO: 86.9 FL (ref 79–97)
MONOCYTES # BLD AUTO: 0.75 10*3/MM3 (ref 0.1–0.9)
MONOCYTES # BLD AUTO: 0.99 10*3/MM3 (ref 0.1–0.9)
MONOCYTES NFR BLD AUTO: 6.2 % (ref 5–12)
MONOCYTES NFR BLD AUTO: 8 % (ref 5–12)
NEUTROPHILS NFR BLD AUTO: 66.3 % (ref 42.7–76)
NEUTROPHILS NFR BLD AUTO: 70.7 % (ref 42.7–76)
NEUTROPHILS NFR BLD AUTO: 8.24 10*3/MM3 (ref 1.7–7)
NEUTROPHILS NFR BLD AUTO: 8.53 10*3/MM3 (ref 1.7–7)
NITRITE UR QL STRIP: NEGATIVE
NRBC BLD AUTO-RTO: 0 /100 WBC (ref 0–0.2)
NRBC BLD AUTO-RTO: 0 /100 WBC (ref 0–0.2)
NT-PROBNP SERPL-MCNC: 671.9 PG/ML (ref 0–900)
PH UR STRIP.AUTO: 5.5 [PH] (ref 5–8)
PLATELET # BLD AUTO: 293 10*3/MM3 (ref 140–450)
PLATELET # BLD AUTO: 315 10*3/MM3 (ref 140–450)
PMV BLD AUTO: 9.6 FL (ref 6–12)
PMV BLD AUTO: 9.7 FL (ref 6–12)
POTASSIUM SERPL-SCNC: 3.9 MMOL/L (ref 3.5–5.2)
PROT SERPL-MCNC: 7.2 G/DL (ref 6–8.5)
PROT UR QL STRIP: ABNORMAL
PROTHROMBIN TIME: 15.3 SECONDS (ref 12.3–15.1)
RBC # BLD AUTO: 4.26 10*6/MM3 (ref 4.14–5.8)
RBC # BLD AUTO: 4.58 10*6/MM3 (ref 4.14–5.8)
RBC # UR STRIP: ABNORMAL /HPF
REF LAB TEST METHOD: ABNORMAL
SODIUM SERPL-SCNC: 141 MMOL/L (ref 136–145)
SP GR UR STRIP: 1.03 (ref 1–1.03)
SQUAMOUS #/AREA URNS HPF: ABNORMAL /HPF
TROPONIN T SERPL HS-MCNC: 12 NG/L
UFH PPP CHRO-ACNC: 0.1 IU/ML (ref 0.3–0.7)
UROBILINOGEN UR QL STRIP: ABNORMAL
WBC # UR STRIP: ABNORMAL /HPF
WBC NRBC COR # BLD AUTO: 12.05 10*3/MM3 (ref 3.4–10.8)
WBC NRBC COR # BLD AUTO: 12.43 10*3/MM3 (ref 3.4–10.8)
WHOLE BLOOD HOLD COAG: NORMAL
WHOLE BLOOD HOLD SPECIMEN: NORMAL

## 2024-08-10 PROCEDURE — 74177 CT ABD & PELVIS W/CONTRAST: CPT

## 2024-08-10 PROCEDURE — 25510000001 IOPAMIDOL 61 % SOLUTION: Performed by: STUDENT IN AN ORGANIZED HEALTH CARE EDUCATION/TRAINING PROGRAM

## 2024-08-10 PROCEDURE — 96366 THER/PROPH/DIAG IV INF ADDON: CPT

## 2024-08-10 PROCEDURE — G0378 HOSPITAL OBSERVATION PER HR: HCPCS

## 2024-08-10 PROCEDURE — 81001 URINALYSIS AUTO W/SCOPE: CPT | Performed by: STUDENT IN AN ORGANIZED HEALTH CARE EDUCATION/TRAINING PROGRAM

## 2024-08-10 PROCEDURE — 99223 1ST HOSP IP/OBS HIGH 75: CPT | Performed by: FAMILY MEDICINE

## 2024-08-10 PROCEDURE — 96365 THER/PROPH/DIAG IV INF INIT: CPT

## 2024-08-10 PROCEDURE — 85610 PROTHROMBIN TIME: CPT | Performed by: STUDENT IN AN ORGANIZED HEALTH CARE EDUCATION/TRAINING PROGRAM

## 2024-08-10 PROCEDURE — 85730 THROMBOPLASTIN TIME PARTIAL: CPT | Performed by: STUDENT IN AN ORGANIZED HEALTH CARE EDUCATION/TRAINING PROGRAM

## 2024-08-10 PROCEDURE — 71045 X-RAY EXAM CHEST 1 VIEW: CPT

## 2024-08-10 PROCEDURE — 85025 COMPLETE CBC W/AUTO DIFF WBC: CPT | Performed by: STUDENT IN AN ORGANIZED HEALTH CARE EDUCATION/TRAINING PROGRAM

## 2024-08-10 PROCEDURE — 83880 ASSAY OF NATRIURETIC PEPTIDE: CPT | Performed by: STUDENT IN AN ORGANIZED HEALTH CARE EDUCATION/TRAINING PROGRAM

## 2024-08-10 PROCEDURE — 71275 CT ANGIOGRAPHY CHEST: CPT

## 2024-08-10 PROCEDURE — 85520 HEPARIN ASSAY: CPT | Performed by: STUDENT IN AN ORGANIZED HEALTH CARE EDUCATION/TRAINING PROGRAM

## 2024-08-10 PROCEDURE — 83735 ASSAY OF MAGNESIUM: CPT | Performed by: STUDENT IN AN ORGANIZED HEALTH CARE EDUCATION/TRAINING PROGRAM

## 2024-08-10 PROCEDURE — 80053 COMPREHEN METABOLIC PANEL: CPT | Performed by: STUDENT IN AN ORGANIZED HEALTH CARE EDUCATION/TRAINING PROGRAM

## 2024-08-10 PROCEDURE — 99285 EMERGENCY DEPT VISIT HI MDM: CPT

## 2024-08-10 PROCEDURE — 84484 ASSAY OF TROPONIN QUANT: CPT | Performed by: STUDENT IN AN ORGANIZED HEALTH CARE EDUCATION/TRAINING PROGRAM

## 2024-08-10 PROCEDURE — 93005 ELECTROCARDIOGRAM TRACING: CPT | Performed by: STUDENT IN AN ORGANIZED HEALTH CARE EDUCATION/TRAINING PROGRAM

## 2024-08-10 PROCEDURE — 36415 COLL VENOUS BLD VENIPUNCTURE: CPT

## 2024-08-10 PROCEDURE — 96376 TX/PRO/DX INJ SAME DRUG ADON: CPT

## 2024-08-10 RX ORDER — SODIUM CHLORIDE 0.9 % (FLUSH) 0.9 %
10 SYRINGE (ML) INJECTION AS NEEDED
Status: DISCONTINUED | OUTPATIENT
Start: 2024-08-10 | End: 2024-08-11 | Stop reason: HOSPADM

## 2024-08-10 RX ORDER — HEPARIN SODIUM 10000 [USP'U]/100ML
16.2 INJECTION, SOLUTION INTRAVENOUS
Status: DISCONTINUED | OUTPATIENT
Start: 2024-08-11 | End: 2024-08-11

## 2024-08-10 RX ORDER — HEPARIN SODIUM 1000 [USP'U]/ML
4000 INJECTION, SOLUTION INTRAVENOUS; SUBCUTANEOUS AS NEEDED
Status: DISCONTINUED | OUTPATIENT
Start: 2024-08-10 | End: 2024-08-11

## 2024-08-10 RX ORDER — HEPARIN SODIUM 1000 [USP'U]/ML
2000 INJECTION, SOLUTION INTRAVENOUS; SUBCUTANEOUS AS NEEDED
Status: DISCONTINUED | OUTPATIENT
Start: 2024-08-10 | End: 2024-08-11

## 2024-08-10 RX ORDER — HEPARIN SODIUM 1000 [USP'U]/ML
80 INJECTION, SOLUTION INTRAVENOUS; SUBCUTANEOUS ONCE
Status: COMPLETED | OUTPATIENT
Start: 2024-08-11 | End: 2024-08-11

## 2024-08-10 RX ADMIN — IOPAMIDOL 100 ML: 612 INJECTION, SOLUTION INTRAVENOUS at 21:19

## 2024-08-11 ENCOUNTER — APPOINTMENT (OUTPATIENT)
Dept: CARDIOLOGY | Facility: HOSPITAL | Age: 69
End: 2024-08-11
Payer: MEDICARE

## 2024-08-11 ENCOUNTER — APPOINTMENT (OUTPATIENT)
Dept: ULTRASOUND IMAGING | Facility: HOSPITAL | Age: 69
End: 2024-08-11
Payer: MEDICARE

## 2024-08-11 ENCOUNTER — READMISSION MANAGEMENT (OUTPATIENT)
Dept: CALL CENTER | Facility: HOSPITAL | Age: 69
End: 2024-08-11
Payer: MEDICARE

## 2024-08-11 VITALS
HEIGHT: 72 IN | RESPIRATION RATE: 20 BRPM | TEMPERATURE: 98.1 F | BODY MASS INDEX: 27.53 KG/M2 | HEART RATE: 66 BPM | DIASTOLIC BLOOD PRESSURE: 68 MMHG | OXYGEN SATURATION: 97 % | WEIGHT: 203.26 LBS | SYSTOLIC BLOOD PRESSURE: 128 MMHG

## 2024-08-11 LAB — UFH PPP CHRO-ACNC: 0.59 IU/ML (ref 0.3–0.7)

## 2024-08-11 PROCEDURE — 93970 EXTREMITY STUDY: CPT

## 2024-08-11 PROCEDURE — 96376 TX/PRO/DX INJ SAME DRUG ADON: CPT

## 2024-08-11 PROCEDURE — 93306 TTE W/DOPPLER COMPLETE: CPT

## 2024-08-11 PROCEDURE — 25010000002 HEPARIN (PORCINE) PER 1000 UNITS: Performed by: STUDENT IN AN ORGANIZED HEALTH CARE EDUCATION/TRAINING PROGRAM

## 2024-08-11 PROCEDURE — G0378 HOSPITAL OBSERVATION PER HR: HCPCS

## 2024-08-11 PROCEDURE — 85520 HEPARIN ASSAY: CPT | Performed by: FAMILY MEDICINE

## 2024-08-11 PROCEDURE — 93010 ELECTROCARDIOGRAM REPORT: CPT | Performed by: INTERNAL MEDICINE

## 2024-08-11 PROCEDURE — 93005 ELECTROCARDIOGRAM TRACING: CPT | Performed by: STUDENT IN AN ORGANIZED HEALTH CARE EDUCATION/TRAINING PROGRAM

## 2024-08-11 PROCEDURE — 99239 HOSP IP/OBS DSCHRG MGMT >30: CPT | Performed by: STUDENT IN AN ORGANIZED HEALTH CARE EDUCATION/TRAINING PROGRAM

## 2024-08-11 PROCEDURE — 93306 TTE W/DOPPLER COMPLETE: CPT | Performed by: INTERNAL MEDICINE

## 2024-08-11 PROCEDURE — 96365 THER/PROPH/DIAG IV INF INIT: CPT

## 2024-08-11 PROCEDURE — 96366 THER/PROPH/DIAG IV INF ADDON: CPT

## 2024-08-11 RX ORDER — NITROGLYCERIN 0.4 MG/1
0.4 TABLET SUBLINGUAL
Status: DISCONTINUED | OUTPATIENT
Start: 2024-08-11 | End: 2024-08-11 | Stop reason: HOSPADM

## 2024-08-11 RX ORDER — SODIUM CHLORIDE 0.9 % (FLUSH) 0.9 %
10 SYRINGE (ML) INJECTION EVERY 12 HOURS SCHEDULED
Status: DISCONTINUED | OUTPATIENT
Start: 2024-08-11 | End: 2024-08-11 | Stop reason: HOSPADM

## 2024-08-11 RX ORDER — BISACODYL 5 MG/1
5 TABLET, DELAYED RELEASE ORAL DAILY PRN
Status: DISCONTINUED | OUTPATIENT
Start: 2024-08-11 | End: 2024-08-11 | Stop reason: HOSPADM

## 2024-08-11 RX ORDER — ENOXAPARIN SODIUM 100 MG/ML
100 INJECTION SUBCUTANEOUS EVERY 12 HOURS SCHEDULED
Qty: 4 ML | Refills: 0 | Status: SHIPPED | OUTPATIENT
Start: 2024-08-11 | End: 2024-08-14

## 2024-08-11 RX ORDER — AMOXICILLIN 250 MG
2 CAPSULE ORAL 2 TIMES DAILY PRN
Status: DISCONTINUED | OUTPATIENT
Start: 2024-08-11 | End: 2024-08-11 | Stop reason: HOSPADM

## 2024-08-11 RX ORDER — APIXABAN 5 MG (74)
5 KIT ORAL SEE ADMIN INSTRUCTIONS
Qty: 74 TABLET | Refills: 0 | Status: SHIPPED | OUTPATIENT
Start: 2024-08-11 | End: 2024-08-11 | Stop reason: HOSPADM

## 2024-08-11 RX ORDER — SODIUM CHLORIDE 9 MG/ML
40 INJECTION, SOLUTION INTRAVENOUS AS NEEDED
Status: DISCONTINUED | OUTPATIENT
Start: 2024-08-11 | End: 2024-08-11 | Stop reason: HOSPADM

## 2024-08-11 RX ORDER — TAMSULOSIN HYDROCHLORIDE 0.4 MG/1
1 CAPSULE ORAL DAILY
Qty: 30 CAPSULE | Refills: 0 | Status: SHIPPED | OUTPATIENT
Start: 2024-08-11

## 2024-08-11 RX ORDER — BISACODYL 10 MG
10 SUPPOSITORY, RECTAL RECTAL DAILY PRN
Status: DISCONTINUED | OUTPATIENT
Start: 2024-08-11 | End: 2024-08-11 | Stop reason: HOSPADM

## 2024-08-11 RX ORDER — POLYETHYLENE GLYCOL 3350 17 G/17G
17 POWDER, FOR SOLUTION ORAL DAILY PRN
Status: DISCONTINUED | OUTPATIENT
Start: 2024-08-11 | End: 2024-08-11 | Stop reason: HOSPADM

## 2024-08-11 RX ORDER — SODIUM CHLORIDE 0.9 % (FLUSH) 0.9 %
10 SYRINGE (ML) INJECTION AS NEEDED
Status: DISCONTINUED | OUTPATIENT
Start: 2024-08-11 | End: 2024-08-11 | Stop reason: HOSPADM

## 2024-08-11 RX ORDER — APIXABAN 5 MG (74)
5 KIT ORAL SEE ADMIN INSTRUCTIONS
Qty: 222.3 TABLET | Refills: 0 | Status: SHIPPED | OUTPATIENT
Start: 2024-08-11

## 2024-08-11 RX ADMIN — HEPARIN SODIUM 7400 UNITS: 1000 INJECTION, SOLUTION INTRAVENOUS; SUBCUTANEOUS at 00:37

## 2024-08-11 RX ADMIN — Medication 10 ML: at 09:29

## 2024-08-11 RX ADMIN — Medication 10 ML: at 00:41

## 2024-08-11 RX ADMIN — HEPARIN SODIUM 16.2 UNITS/KG/HR: 10000 INJECTION, SOLUTION INTRAVENOUS at 00:28

## 2024-08-11 RX ADMIN — APIXABAN 10 MG: 5 TABLET, FILM COATED ORAL at 10:30

## 2024-08-11 NOTE — ED PROVIDER NOTES
[facility] EMERGENCY DEPARTMENT  Emergency Department Encounter  Emergency Medicine Physician Note       Pt Name: Carmelo Arnold  MRN: [id number]  Pt :   1955  Room Number:    Date of encounter:  8/10/2024  PCP: Hortensia Huynh APRN  ED Physician: Pepe Cortez DO    HPI:  Carmelo Arnold is a 68 y.o. male who presents to the ED with chief complaint of shortness of breath.  This started gradually over the past several days.  States that he was helping his son move furniture whenever he became extremely winded.  Has been experiencing generalized fatigue since onset.  Reports associated lower quadrant abdominal pain.  No fever, chills, cough, chest pain, back pain, numbness or weakness in extremities, problems with urination or bowel movements, leg pain or swelling.    PAST MEDICAL HISTORY  Past Medical History:   Diagnosis Date    Acid reflux     Benign colon polyp 2021    Benign prostatic hyperplasia ?    Colon polyp     Colon polyp     Coronary artery disease involving native coronary artery of native heart with refractory angina pectoris 2023    Erectile dysfunction     Heart murmur     Primary hypertension 2022    Urinary tract infection ?     Current Outpatient Medications   Medication Instructions    Aspirin 81 mg, Oral, Daily    cefuroxime (CEFTIN) 250 MG tablet TAKE ONE TABLET BY MOUTH 2 TIMES DAILY FOR 7 DAYS.    levothyroxine (SYNTHROID, LEVOTHROID) 88 MCG tablet TAKE 1 TABLET BY MOUTH EVERY DAY    lisinopril-hydrochlorothiazide (PRINZIDE,ZESTORETIC) 10-12.5 MG per tablet 1 tablet, Oral, Daily    loratadine (CLARITIN) 10 mg, Oral, Daily    metoprolol succinate XL (TOPROL-XL) 25 mg, Oral, Daily    rosuvastatin (CRESTOR) 10 mg, Oral, Daily    vitamin B-12 (CYANOCOBALAMIN) 1,000 mcg, Oral, Daily      PAST SURGICAL HISTORY  Past Surgical History:   Procedure Laterality Date    CARDIAC CATHETERIZATION N/A 3/28/2023    Procedure: Coronary angiography;   Surgeon: Gabe Mcqueen MD;  Location:  [facility];  Service: Cardiology;  Laterality: N/A;    CARDIAC CATHETERIZATION N/A 4/13/2023    Procedure:  PCI of the LAD;  Surgeon: Kevin Cortez IV, MD;  Location:  [facility];  Service: Cardiovascular;  Laterality: N/A;    CARDIAC CATHETERIZATION N/A 4/13/2023    Procedure: Optical Coherent Tomography;  Surgeon: Kevin Cortez IV, MD;  Location:  [facility];  Service: Cardiovascular;  Laterality: N/A;    CARDIAC CATHETERIZATION N/A 4/13/2023    Procedure: Left Heart Cath;  Surgeon: Kevin Cortez IV, MD;  Location:  [facility];  Service: Cardiovascular;  Laterality: N/A;    COLONOSCOPY         FAMILY HISTORY  Family History   Problem Relation Age of Onset    Arthritis Mother     Breast cancer Sister     Migraines Daughter        SOCIAL HISTORY  Social History     Socioeconomic History    Marital status:    Tobacco Use    Smoking status: Never     Passive exposure: Past    Smokeless tobacco: Never   Vaping Use    Vaping status: Never Used   Substance and Sexual Activity    Alcohol use: Yes     Comment: rarely drink    Drug use: Never    Sexual activity: Not Currently     Partners: Female     ALLERGIES  Patient has no known allergies.    REVIEW OF SYSTEMS  All systems reviewed and negative except for those discussed in HPI.     PHYSICAL EXAM  ED Triage Vitals [08/10/24 1900]   Temp Heart Rate Resp BP SpO2   98.2 °F (36.8 °C) 79 19 136/79 99 %      Temp src Heart Rate Source Patient Position BP Location FiO2 (%)   Oral Monitor Sitting Left arm --     I have reviewed the triage vital signs and nursing notes.    General: Alert.  Nontoxic appearance.  No acute distress.  Head: Normocephalic.  Atraumatic.  Eyes: No scleral icterus.  ENT: Moist mucous membranes.  Cardiovascular: Regular rate and rhythm.  No murmurs.  No rubs.  2+ distal pulses  bilaterally.  Respiratory: Equal breath sounds bilaterally.  No rales.  No rhonchi.  No wheezing.  GI: Abdomen is soft.  Nondistended.  + Tenderness suprapubic region.  No rebound.  No guarding.  No CVA tenderness.  MSK: Moves all 4 extremities.  Neurologic: Oriented x 3.  No focal deficits.  Skin: No erythema.  No edema. No pallor. No cyanosis.  Psych: Normal mood.  Pleasant affect.    LAB RESULTS  Recent Results (from the past 24 hour(s))   Comprehensive Metabolic Panel    Collection Time: 08/10/24  7:20 PM    Specimen: Blood   Result Value Ref Range    Glucose 165 (H) 65 - 99 mg/dL    BUN 22 8 - 23 mg/dL    Creatinine 1.33 (H) 0.76 - 1.27 mg/dL    Sodium 141 136 - 145 mmol/L    Potassium 3.9 3.5 - 5.2 mmol/L    Chloride 102 98 - 107 mmol/L    CO2 23.7 22.0 - 29.0 mmol/L    Calcium 8.8 8.6 - 10.5 mg/dL    Total Protein 7.2 6.0 - 8.5 g/dL    Albumin 3.9 3.5 - 5.2 g/dL    ALT (SGPT) 21 1 - 41 U/L    AST (SGOT) 18 1 - 40 U/L    Alkaline Phosphatase 62 39 - 117 U/L    Total Bilirubin 0.3 0.0 - 1.2 mg/dL    Globulin 3.3 gm/dL    A/G Ratio 1.2 g/dL    BUN/Creatinine Ratio 16.5 7.0 - 25.0    Anion Gap 15.3 (H) 5.0 - 15.0 mmol/L    eGFR 57.5 (L) >60.0 mL/min/1.73   Single High Sensitivity Troponin T    Collection Time: 08/10/24  7:20 PM    Specimen: Blood   Result Value Ref Range    HS Troponin T 12 <22 ng/L   Magnesium    Collection Time: 08/10/24  7:20 PM    Specimen: Blood   Result Value Ref Range    Magnesium 2.3 1.6 - 2.4 mg/dL   Green Top (Gel)    Collection Time: 08/10/24  7:20 PM   Result Value Ref Range    Extra Tube Hold for add-ons.    Lavender Top    Collection Time: 08/10/24  7:20 PM   Result Value Ref Range    Extra Tube hold for add-on    Gold Top - SST    Collection Time: 08/10/24  7:20 PM   Result Value Ref Range    Extra Tube Hold for add-ons.    Light Blue Top    Collection Time: 08/10/24  7:20 PM   Result Value Ref Range    Extra Tube Hold for add-ons.    CBC Auto Differential    Collection Time:  08/10/24  7:20 PM    Specimen: Blood   Result Value Ref Range    WBC 12.05 (H) 3.40 - 10.80 10*3/mm3    RBC 4.58 4.14 - 5.80 10*6/mm3    Hemoglobin 13.6 13.0 - 17.7 g/dL    Hematocrit 39.8 37.5 - 51.0 %    MCV 86.9 79.0 - 97.0 fL    MCH 29.7 26.6 - 33.0 pg    MCHC 34.2 31.5 - 35.7 g/dL    RDW 14.1 12.3 - 15.4 %    RDW-SD 45.1 37.0 - 54.0 fl    MPV 9.7 6.0 - 12.0 fL    Platelets 315 140 - 450 10*3/mm3    Neutrophil % 70.7 42.7 - 76.0 %    Lymphocyte % 17.3 (L) 19.6 - 45.3 %    Monocyte % 6.2 5.0 - 12.0 %    Eosinophil % 3.6 0.3 - 6.2 %    Basophil % 0.6 0.0 - 1.5 %    Immature Grans % 1.6 (H) 0.0 - 0.5 %    Neutrophils, Absolute 8.53 (H) 1.70 - 7.00 10*3/mm3    Lymphocytes, Absolute 2.08 0.70 - 3.10 10*3/mm3    Monocytes, Absolute 0.75 0.10 - 0.90 10*3/mm3    Eosinophils, Absolute 0.43 (H) 0.00 - 0.40 10*3/mm3    Basophils, Absolute 0.07 0.00 - 0.20 10*3/mm3    Immature Grans, Absolute 0.19 (H) 0.00 - 0.05 10*3/mm3    nRBC 0.0 0.0 - 0.2 /100 WBC   BNP    Collection Time: 08/10/24  7:20 PM    Specimen: Blood   Result Value Ref Range    proBNP 671.9 0.0 - 900.0 pg/mL   Urinalysis With Microscopic If Indicated (No Culture) - Urine, Clean Catch    Collection Time: 08/10/24  7:49 PM    Specimen: Urine, Clean Catch   Result Value Ref Range    Color, UA Yellow Yellow, Straw    Appearance, UA Clear Clear    pH, UA 5.5 5.0 - 8.0    Specific Gravity, UA 1.027 1.005 - 1.030    Glucose, UA Negative Negative    Ketones, UA Trace (A) Negative    Bilirubin, UA Negative Negative    Blood, UA Negative Negative    Protein, UA Trace (A) Negative    Leuk Esterase, UA Trace (A) Negative    Nitrite, UA Negative Negative    Urobilinogen, UA 0.2 E.U./dL 0.2 - 1.0 E.U./dL   Urinalysis, Microscopic Only - Urine, Clean Catch    Collection Time: 08/10/24  7:49 PM    Specimen: Urine, Clean Catch   Result Value Ref Range    RBC, UA 0-2 None Seen, 0-2 /HPF    WBC, UA 3-5 (A) None Seen, 0-2 /HPF    Bacteria, UA None Seen None Seen /HPF    Squamous  Epithelial Cells, UA None Seen None Seen, 0-2 /HPF    Hyaline Casts, UA 0-2 None Seen /LPF    Methodology Manual Light Microscopy        RADIOLOGY  CT Angiogram Chest Pulmonary Embolism    Addendum Date: 8/10/2024    ADDENDUM REPORT ADDENDUM: This report was discussed with Dr. Cortez on Aug 10, 2024 22:29:00 EDT. Authenticated and Electronically Signed by Yaron Mathis MD on 08/10/2024 10:29:42 PM    Result Date: 8/10/2024  FINAL REPORT TECHNIQUE: null CLINICAL HISTORY: RT Leg cramping, concern for blood clot, weakness, lethargic; R/O PULMONARUY EMBOLISM COMPARISON: null FINDINGS: CT Angiography Chest W Contrast 3D Postprocessing COMPARISON: CT/SR - CT CHEST W CONTRAST - 06/10/2024 03:53 PM EDT FINDINGS: Occlusive bilateral lower lobe subsegmental pulmonary emboli. No thoracic aortic aneurysm or dissection. No consolidation. Calcified right lower lobe granuloma. Stable 6 mm left lower lobe pulmonary nodule (series 4, image 71). No pleural effusion. No pneumothorax. No cardiomegaly. No pericardial effusion. RV/LV Ratio: 1.55. No pathologically enlarged lymph nodes. No acute fracture. Small hiatal hernia.     IMPRESSION: Bilateral lower lobe subsegmental pulmonary emboli. Possible right heart strain. Stable left lower lobe pulmonary nodule. Recommend follow-up chest CT in 6-12 months per Fleischner Society guidelines. See separate Abdomen/Pelvis CT report. Authenticated and Electronically Signed by Yaron Mathis MD on 08/10/2024 10:27:30 PM    CT Abdomen Pelvis With Contrast    Addendum Date: 8/10/2024    ADDENDUM REPORT ADDENDUM: Receipt of this report by the clinical staff was confirmed with Sarah Marlow RN on Aug 10, 2024 22:04:00 EDT. Authenticated and Electronically Signed by Yaron Mathis MD on 08/10/2024 10:04:36 PM    Result Date: 8/10/2024  FINAL REPORT TECHNIQUE: null CLINICAL HISTORY: Bilateral LOWER QUAD ABDOMINAL PAIN COMPARISON: null FINDINGS: CT Abdomen and Pelvis W Contrast COMPARISON: None FINDINGS:  Small hiatal hernia. Normal liver. Normal spleen. Right renal cysts measuring up to 10.5 cm. Unremarkable left kidney. No hydronephrosis. Normal adrenal glands. Normal pancreas. No visible gallstones. No biliary dilation. Colonic diverticulosis without evidence of acute diverticulitis. No mucosal thickening. No evidence of obstruction. Normal appendix. Unremarkable bladder. Enlarged prostate indenting the bladder, with right lateral cyst measuring 1.9 cm. Left superolateral prostate is hypodense (for example series 5, image 57). No ascites. No pneumoperitoneum. No lymphadenopathy. No acute fracture. Degenerative changes in the spine. No abdominal aortic aneurysm.     IMPRESSION: No acute findings. Abnormal prostate. Underlying neoplasm cannot be excluded. Recommend urology referral if not worked up in the past. Nonemergent/incidental findings above. Authenticated and Electronically Signed by Yaron Mathis MD on 08/10/2024 09:51:12 PM     PROCEDURES  Critical Care    Performed by: Pepe Cortez DO  Authorized by: Pepe Cortez DO    Comments:      CRITICAL CARE PROCEDURE NOTE  Authorized and performed by: Dr. Cortez  Total critical care time: Approximately 35 minutes.  Patient was critically ill due to: Pulmonary embolism  Interventions: IV heparin, close airway/mental status/hemodynamic monitoring    Due to a high probability of clinically significant, life threatening deterioration, the patient required my highest level of preparedness to intervene emergently and I personally spent this critical care time directly and personally managing the patient.  This critical care time included obtaining a history; examining the patient; pulse oximetry; ordering and review of studies; arranging urgent treatment with development of a management plan; evaluation of patient's response to treatment; frequent reassessment; and, discussions with other providers.    This critical care time was performed to assess and manage  the high probability of imminent, life-threatening deterioration that could result in multiorgan failure.  It was exclusive of separately billable procedures and treating other patients and teaching time.    Please see MDM section and the rest of the note for further information on patient assessment and interventions.        RISK STRATIFICATION    MEDICAL DECISION MAKING  68 y.o. male with past medical history listed above who presents with shortness of breath, generalized fatigue, and lower quadrant abdominal pain.    Vital signs within normal limits.  Pulse ox on 8% on room air.    Based on clinical presentation and physical exam, differential diagnosis includes, but is not limited to, acute coronary syndrome, cardiomyopathy, CHF, pulmonary embolism, pneumonia, pneumothorax, intra-abdominal obstructive process.    At least 3 different tests have been ordered on this patient.    EKG per my interpretation normal sinus rhythm, rate 75, left axis deviation, no ST segment ovation or depression, normal QRS QTc intervals.    I reviewed the labs listed above. Clinically unremarkable. Notable findings are highlighted below.    CBC shows mild leukocytosis.  Serum creatinine 1.33.  Normal proBNP and troponin.  UA negative for infection or hematuria.    Old laboratory data was reviewed from the medical records and compared to today's results.    I have independently reviewed and interpreted the chest x-ray.  My interpretation is negative for infiltrate.    Radiologist called critical findings on CT scan of the chest: Bilateral subsegmental pulmonary embolism with possible right heart strain.    Case discussed with Dr. Ferreira (cardiology).  Given subsegmental nature of the pulmonary embolism, negative proBNP and troponin, and hemodynamic stability of the patient he is not a candidate for endovascular intervention.  Agrees with IV heparin and admission to medicine service for further workup and management.    On re-evaluation,  patient resting comfortably.  Vital signs remained stable on room air.  Patient will require medical admission for further workup and management.  I discussed the findings of the ED workup with the patient including my recommendation for admission.  Patient agreeable with plan and disposition.    Case discussed with Dr. Regalado (hospitalist) who agrees to evaluate and admit the patient.  We discussed the HPI, pertinent PMHx, ED course and workup.    Medications administered in ED:  Medications   sodium chloride 0.9 % flush 10 mL (has no administration in time range)   heparin (porcine) injection 7,400 Units (has no administration in time range)   heparin 94425 units/250 ml (100 units/ml) in D5W (has no administration in time range)   heparin (porcine) injection 4,000 Units (has no administration in time range)   heparin (porcine) injection 2,000 Units (has no administration in time range)   Pharmacy to Dose Heparin (has no administration in time range)   iopamidol (ISOVUE-300) 61 % injection 100 mL (100 mL Intravenous Given 8/10/24 2119)     REPEAT VITAL SIGNS  AS OF 22:58 EDT VITALS:  BP - 114/61  HR - 74  TEMP - 98.2 °F (36.8 °C) (Oral)  O2 SATS - 98%    DIAGNOSIS  Final diagnoses:   Acute pulmonary embolism, unspecified pulmonary embolism type, unspecified whether acute cor pulmonale present     DISPOSITION  ED Disposition       ED Disposition   Decision to Admit    Condition   --    Comment   Level of Care: Telemetry [5]   Diagnosis: Pulmonary embolus [[id number]]   Admitting Physician: JEFFREY REGALADO [[id number]]   Attending Physician: JEFFREY REGALADO [[id number]]               Please note that portions of this document were completed with voice recognition software.        Pepe Cortez DO  08/12/24 [id number]

## 2024-08-11 NOTE — DISCHARGE SUMMARY
[facility]   DISCHARGE SUMMARY      Name:  Carmelo Arnold   Age:  68 y.o.  Sex:  male  :  1955  MRN:  [id number]   Visit Number:  [id number]    Admission Date:  8/10/2024  Date of Discharge:  2024  Primary Care Physician:  Hortensia Huynh APRN    Important issues to note:    -Continue Eliquis for at least 90 days, follow-up with PCP.  -Provided patient Flomax per request.    Discharge Diagnoses:     Pulmonary embolisms  Enlarged prostate with urinary retention    Problem List:     Active Hospital Problems    Diagnosis  POA    **Pulmonary embolus [I26.99]  Yes      Resolved Hospital Problems   No resolved problems to display.     Presenting Problem:    Chief Complaint   Patient presents with    Abdominal Pain      Consults:     Consulting Physician(s)                     None              Procedures Performed:    None    History of presenting illness/Hospital Course:    Carmelo Arnold is a 68-year-old male brought to the emergency department with complaints of fatigue and dyspnea.  Patient reports that this has been onset over the last 3 weeks, gradually getting worse.  Patient states that day of presentation, he was helping his son move furniture, when he became very winded.  He reports never having experienced this before, this was out of the ordinary for him.  Patient reports 3 weeks ago, he had driven to [place] on a road trip.  Patient denies any recent sick contacts, recent bleeding, history of blood clots.     CTA chest done in the ED showed bilateral pulmonary emboli, suspected right heart strain.  Cardiology was consulted with regards to the right heart strain, recommended medical management and obtaining 2D echo in AM.      Observation general floor admission early a.m. 2024 with pulmonary embolisms.  Initially was placed on heparin.  Transition to Eliquis.  Not requiring oxygen, ambulating well.  Bilateral venous Doppler lower extremities negative for  DVT.    Stable for discharge home 8/11/2024.    -Continue Eliquis for at least 90 days, follow-up with PCP.  -Provided patient Flomax per request.      Vital Signs:    Temp:  [98.1 °F (36.7 °C)-98.6 °F (37 °C)] 98.1 °F (36.7 °C)  Heart Rate:  [64-80] 66  Resp:  [16-20] 20  BP: (101-136)/(54-80) 128/68    Physical Exam:    General Appearance:  Alert and cooperative.    Head:  Atraumatic and normocephalic.   Eyes: Conjunctivae and sclerae normal, no icterus. No pallor.   Ears:  Ears with no abnormalities noted.   Throat: No oral lesions, no thrush, oral mucosa moist.   Neck: Supple, trachea midline, no thyromegaly.   Back:   No kyphoscoliosis present. No tenderness to palpation.   Lungs:   Breath sounds heard bilaterally equally.  No crackles or wheezing. No Pleural rub or bronchial breathing.   Heart:  Normal S1 and S2, no murmur, no gallop, no rub. No JVD.   Abdomen:   Normal bowel sounds, no masses, no organomegaly. Soft, nontender, nondistended, no rebound tenderness.   Extremities: Supple, no edema, no cyanosis, no clubbing.   Pulses: Pulses palpable bilaterally.   Skin: Warm.   Neurologic: Alert and oriented x 3. No facial asymmetry. Moves all four limbs. No tremors.     Pertinent Lab Results:     Results from last 7 days   Lab Units 08/10/24  1920   SODIUM mmol/L 141   POTASSIUM mmol/L 3.9   CHLORIDE mmol/L 102   CO2 mmol/L 23.7   BUN mg/dL 22   CREATININE mg/dL 1.33*   CALCIUM mg/dL 8.8   BILIRUBIN mg/dL 0.3   ALK PHOS U/L 62   ALT (SGPT) U/L 21   AST (SGOT) U/L 18   GLUCOSE mg/dL 165*     Results from last 7 days   Lab Units 08/10/24  2329 08/10/24  1920   WBC 10*3/mm3 12.43* 12.05*   HEMOGLOBIN g/dL 12.7* 13.6   HEMATOCRIT % 36.9* 39.8   PLATELETS 10*3/mm3 293 315     Results from last 7 days   Lab Units 08/10/24  2329   INR  1.15*     Results from last 7 days   Lab Units 08/10/24  1920   HSTROP T ng/L 12     Results from last 7 days   Lab Units 08/10/24  1920   PROBNP pg/mL 671.9                        Pertinent Radiology Results:    Imaging Results (All)       Procedure Component Value Units Date/Time    US Venous Doppler Lower Extremity Bilateral (duplex) [[id number]] Collected: 08/11/24 1216     Updated: 08/11/24 1219    Narrative:      PROCEDURE: US VENOUS DOPPLER LOWER EXTREMITY BILATERAL (DUPLEX)-     HISTORY: PE, rule out LE DVT; I26.99-Other pulmonary embolism without  acute cor pulmonale     PROCEDURE: Multiple transverse and longitudinal scans were performed of  both femoropopliteal deep venous system, with augmentation and  compression maneuvers.     FINDINGS: Normal phasic flow was noted in the visualized deep venous  system. No intraluminal increased echogenicity is noted to suggest  thrombus. There is normal compression of the venous structures. No  abnormal venous collaterals are seen. . .       Impression:      No evidence of left or right lower extremity DVT.                    This report was signed and finalized on 8/11/2024 12:16 PM by Jelani Leiva MD.       XR Chest 1 View [[id number]] Collected: 08/11/24 0835     Updated: 08/11/24 0838    Narrative:      PROCEDURE: XR CHEST 1 VW-     HISTORY: Weak/Dizzy/AMS triage protocol        COMPARISON: November 27, 2022.     FINDINGS:  The heart size is normal. The mediastinum is normal. No acute  pulmonary abnormality is identified. There is no pneumothorax. The bony  thorax in intact.        Impression:      No acute cardiopulmonary process.           This report was signed and finalized on 8/11/2024 8:36 AM by Jelani Leiva MD.       CT Angiogram Chest Pulmonary Embolism [[id number]] Collected: 08/10/24 2227     Updated: 08/10/24 2231    Addenda:        ADDENDUM REPORT    ADDENDUM:  This report was discussed with Dr. Cortez on Aug 10, 2024 22:29:00 EDT.    Authenticated and Electronically Signed by Yaron Mathis MD on  08/10/2024 10:29:42 PM  Signed: 08/10/24 2229 by Yaron Mathis MD    Narrative:      FINAL  REPORT    TECHNIQUE:  null    CLINICAL HISTORY:  RT Leg cramping, concern for blood clot, weakness, lethargic;  R/O PULMONARUY EMBOLISM    COMPARISON:  null    FINDINGS:  CT Angiography Chest W Contrast    3D Postprocessing    COMPARISON: CT/SR - CT CHEST W CONTRAST - 06/10/2024 03:53 PM EDT    FINDINGS:    Occlusive bilateral lower lobe subsegmental pulmonary emboli.    No thoracic aortic aneurysm or dissection.    No consolidation. Calcified right lower lobe granuloma. Stable 6 mm left lower lobe pulmonary nodule (series 4, image 71).    No pleural effusion.    No pneumothorax.    No cardiomegaly. No pericardial effusion. RV/LV Ratio: 1.55.    No pathologically enlarged lymph nodes.    No acute fracture.    Small hiatal hernia.      Impression:      IMPRESSION:    Bilateral lower lobe subsegmental pulmonary emboli. Possible right heart strain.    Stable left lower lobe pulmonary nodule. Recommend follow-up chest CT in 6-12 months per Fleischner Society guidelines.    See separate Abdomen/Pelvis CT report.    Authenticated and Electronically Signed by Yaron Mathis MD on  08/10/2024 10:27:30 PM    CT Abdomen Pelvis With Contrast [[id number]] Collected: 08/10/24 2151     Updated: 08/10/24 2206    Addenda:        ADDENDUM REPORT    ADDENDUM:  Receipt of this report by the clinical staff was confirmed with Sarah Marlow RN on Aug 10, 2024 22:04:00 EDT.    Authenticated and Electronically Signed by Yaron Mathis MD on  08/10/2024 10:04:36 PM  Signed: 08/10/24 2204 by Yaron Mathis MD    Narrative:      FINAL REPORT    TECHNIQUE:  null    CLINICAL HISTORY:  Bilateral LOWER QUAD ABDOMINAL PAIN    COMPARISON:  null    FINDINGS:  CT Abdomen and Pelvis W Contrast    COMPARISON: None    FINDINGS:    Small hiatal hernia.    Normal liver.    Normal spleen.    Right renal cysts measuring up to 10.5 cm. Unremarkable left kidney. No hydronephrosis.    Normal adrenal glands.    Normal pancreas.    No visible gallstones. No  biliary dilation.    Colonic diverticulosis without evidence of acute diverticulitis. No mucosal thickening. No evidence of obstruction.    Normal appendix.    Unremarkable bladder.    Enlarged prostate indenting the bladder, with right lateral cyst measuring 1.9 cm. Left superolateral prostate is hypodense (for example series 5, image 57).    No ascites. No pneumoperitoneum.    No lymphadenopathy.    No acute fracture. Degenerative changes in the spine.    No abdominal aortic aneurysm.      Impression:      IMPRESSION:    No acute findings.    Abnormal prostate. Underlying neoplasm cannot be excluded. Recommend urology referral if not worked up in the past.    Nonemergent/incidental findings above.    Authenticated and Electronically Signed by Yaron Mathis MD on  08/10/2024 09:51:12 PM            Echo:    Results for orders placed in visit on 12/02/22    Adult Transthoracic Echo Complete W/ Cont if Necessary Per Protocol    Interpretation Summary    Left ventricular systolic function is normal. Left ventricular ejection fraction appears to be 61 - 65%.    Left ventricular wall thickness is consistent with mild concentric hypertrophy.    Left ventricular diastolic function was normal.    Estimated right ventricular systolic pressure from tricuspid regurgitation is normal (<35 mmHg). Calculated right ventricular systolic pressure from tricuspid regurgitation is 14 mmHg.    Condition on Discharge:      Stable.    Code status during the hospital stay:    Code Status and Medical Interventions: CPR (Attempt to Resuscitate); Full Support   Ordered at: 08/11/24 0021     Level Of Support Discussed With:    Patient     Code Status (Patient has no pulse and is not breathing):    CPR (Attempt to Resuscitate)     Medical Interventions (Patient has pulse or is breathing):    Full Support     Discharge Disposition:        Discharge Medications:       Discharge Medications        ASK your doctor about these medications         Instructions Start Date   Aspirin 81 MG capsule   81 mg, Oral, Daily      cefuroxime 250 MG tablet  Commonly known as: CEFTIN   TAKE ONE TABLET BY MOUTH 2 TIMES DAILY FOR 7 DAYS.      levothyroxine 88 MCG tablet  Commonly known as: SYNTHROID, LEVOTHROID   TAKE 1 TABLET BY MOUTH EVERY DAY      lisinopril-hydrochlorothiazide 10-12.5 MG per tablet  Commonly known as: PRINZIDE,ZESTORETIC   1 tablet, Oral, Daily      loratadine 10 MG tablet  Commonly known as: CLARITIN   10 mg, Oral, Daily      metoprolol succinate XL 25 MG 24 hr tablet  Commonly known as: TOPROL-XL   25 mg, Oral, Daily      rosuvastatin 10 MG tablet  Commonly known as: CRESTOR   10 mg, Oral, Daily      vitamin B-12 1000 MCG tablet  Commonly known as: CYANOCOBALAMIN   1,000 mcg, Oral, Daily             Discharge Diet:       Activity at Discharge:       Follow-up Appointments:     Follow-up Information       Hortensia Huynh APRN Follow up in 3 day(s).    Specialties: Nurse Practitioner, Family Medicine  Contact information:  [address] [place] [id number]  [phone]                           Future Appointments   Date Time Provider Department Center   9/26/2024 10:20 AM Eduardo Pandya MD [facility] ([facility]   12/17/2024 10:15 AM Hortensia Huynh APRN [facility]   5/15/2025  8:45 AM Gabe Mcqueen MD [facility]     Test Results Pending at Discharge:           Brian Joseph Kerley, DO  08/11/24  13:39 EDT    Time: I spent 45 minutes on this discharge activity which included: face-to-face encounter with the patient, reviewing the data in the system, coordination of the care with the nursing staff as well as consultants, documentation, and entering orders.     Dictated utilizing Dragon dictation.

## 2024-08-11 NOTE — PLAN OF CARE
Goal Outcome Evaluation:   Heparin gtt started per order. Patient resting in bed with no complaints or issues at this time.

## 2024-08-11 NOTE — OUTREACH NOTE
Prep Survey      Flowsheet Row Responses   [facility] [place], operated by [facility] patient discharged from? [place]   Is LACE score < 7 ? Yes   Eligibility [facility]   Date of Admission 08/10/24   Date of Discharge 08/11/24   Discharge Disposition Home or Self Care   Discharge diagnosis Bilateral pulmonary emboli   Does the patient have one of the following disease processes/diagnoses(primary or secondary)? Other   Does the patient have Home health ordered? No   Is there a DME ordered? No   Prep survey completed? Yes            DANIEL BARBOZA - Registered Nurse

## 2024-08-11 NOTE — PLAN OF CARE
Goal Outcome Evaluation:            Patient prepared for discharge per MD.

## 2024-08-11 NOTE — H&P
[facility]   HISTORY AND PHYSICAL      Name:  Carmelo Arnold   Age:  68 y.o.  Sex:  male  :  1955  MRN:  [id number]   Visit Number:  [id number]  Admission Date:  8/10/2024  Date Of Service:  24  Primary Care Physician:  Hortensia Huynh APRN    Chief Complaint:     Fatigue, dyspnea    History Of Presenting Illness:      Patient is a 68-year-old male brought to the emergency department with complaints of fatigue and dyspnea.  Patient reports that this has been onset over the last 3 weeks, gradually getting worse.  Patient states that today, he was helping his son move furniture, when he became very winded.  He reports never having experienced this before, this was out of the ordinary for him.  Patient reports 3 weeks ago, he had driven to [place] on a road trip.  Patient denies any recent sick contacts, recent bleeding, history of blood clots.    CTA chest done in the ED showed bilateral pulmonary emboli, suspected right heart strain.  Cardiology was consulted with regards to the right heart strain, recommended medical management and obtaining 2D echo in AM.  Hospital service was contacted for admission.    Review Of Systems:    All systems were reviewed and negative except as mentioned in history of presenting illness, assessment and plan.    Past Medical History: Patient  has a past medical history of Acid reflux, Benign colon polyp (2021), Benign prostatic hyperplasia (?), Colon polyp, Colon polyp, Coronary artery disease involving native coronary artery of native heart with refractory angina pectoris (2023), Erectile dysfunction, Heart murmur, Primary hypertension (2022), and Urinary tract infection (?).    Past Surgical History: Patient  has a past surgical history that includes Colonoscopy; Cardiac catheterization (N/A, 3/28/2023); Cardiac catheterization (N/A, 2023); Cardiac catheterization (N/A, 2023); and Cardiac catheterization  (N/A, 4/13/2023).    Social History: Patient  reports that he has never smoked. He has been exposed to tobacco smoke. He has never used smokeless tobacco. He reports current alcohol use. He reports that he does not use drugs.    Family History:  Patient's family history has been reviewed and found to be noncontributory.     Allergies:      Patient has no known allergies.    Home Medications:    Prior to Admission Medications       Prescriptions Last Dose Informant Patient Reported? Taking?    Aspirin 81 MG capsule 8/9/2024  Yes Yes    Take 81 mg by mouth Daily.    cefuroxime (CEFTIN) 250 MG tablet Past Month  Yes Yes    TAKE ONE TABLET BY MOUTH 2 TIMES DAILY FOR 7 DAYS.    levothyroxine (SYNTHROID, LEVOTHROID) 88 MCG tablet 8/9/2024  No Yes    TAKE 1 TABLET BY MOUTH EVERY DAY    lisinopril-hydrochlorothiazide (PRINZIDE,ZESTORETIC) 10-12.5 MG per tablet 8/9/2024  No Yes    TAKE 1 TABLET BY MOUTH DAILY    loratadine (CLARITIN) 10 MG tablet 8/9/2024  Yes Yes    Take 1 tablet by mouth Daily.    metoprolol succinate XL (TOPROL-XL) 25 MG 24 hr tablet 8/9/2024  No Yes    TAKE 1 TABLET BY MOUTH DAILY    rosuvastatin (CRESTOR) 10 MG tablet 8/9/2024  No Yes    Take 1 tablet by mouth Daily.    vitamin B-12 (CYANOCOBALAMIN) 1000 MCG tablet 8/9/2024  Yes Yes    Take 1 tablet by mouth Daily.          ED Medications:    Medications   sodium chloride 0.9 % flush 10 mL (has no administration in time range)   heparin (porcine) injection 7,400 Units (has no administration in time range)   heparin 28764 units/250 ml (100 units/ml) in D5W (has no administration in time range)   Pharmacy to Dose Heparin (has no administration in time range)   sodium chloride 0.9 % flush 10 mL (has no administration in time range)   sodium chloride 0.9 % flush 10 mL (has no administration in time range)   sodium chloride 0.9 % infusion 40 mL (has no administration in time range)   nitroglycerin (NITROSTAT) SL tablet 0.4 mg (has no administration in time  "range)   sennosides-docusate (PERICOLACE) 8.6-50 MG per tablet 2 tablet (has no administration in time range)     And   polyethylene glycol (MIRALAX) packet 17 g (has no administration in time range)     And   bisacodyl (DULCOLAX) EC tablet 5 mg (has no administration in time range)     And   bisacodyl (DULCOLAX) suppository 10 mg (has no administration in time range)   iopamidol (ISOVUE-300) 61 % injection 100 mL (100 mL Intravenous Given 8/10/24 2119)     Vital Signs:  Temp:  [98.2 °F (36.8 °C)-98.6 °F (37 °C)] 98.6 °F (37 °C)  Heart Rate:  [64-80] 80  Resp:  [16-19] 16  BP: (108-136)/(54-80) 136/72        08/10/24  1900 08/11/24  0002   Weight: 92.5 kg (204 lb) 92.2 kg (203 lb 4.2 oz)     Body mass index is 27.56 kg/m².    Physical Exam:     Most recent vital Signs: /72 (BP Location: Left arm, Patient Position: Sitting)   Pulse 80   Temp 98.6 °F (37 °C) (Oral)   Resp 16   Ht 182.9 cm (72.01\")   Wt 92.2 kg (203 lb 4.2 oz)   SpO2 98%   BMI 27.56 kg/m²     Physical Exam  Constitutional: Awake, alert  Eyes: PERRLA, sclerae anicteric, no conjunctival injection  HENT: NCAT, mucous membranes moist  Neck: Supple, no thyromegaly, no lymphadenopathy, trachea midline  Respiratory: Clear to auscultation bilaterally, nonlabored respirations   Cardiovascular: RRR, no murmurs, rubs, or gallops, palpable pedal pulses bilaterally  Gastrointestinal: Positive bowel sounds, soft, nontender, nondistended  Musculoskeletal: No bilateral ankle edema, no clubbing or cyanosis to extremities  Psychiatric: Appropriate affect, cooperative  Neurologic: Oriented x 3, strength symmetric in all extremities, Cranial Nerves grossly intact to confrontation, speech clear  Skin: No rashes     Laboratory data:    I have reviewed the labs done in the emergency room.    Results from last 7 days   Lab Units 08/10/24  1920   SODIUM mmol/L 141   POTASSIUM mmol/L 3.9   CHLORIDE mmol/L 102   CO2 mmol/L 23.7   BUN mg/dL 22   CREATININE mg/dL " 1.33*   CALCIUM mg/dL 8.8   BILIRUBIN mg/dL 0.3   ALK PHOS U/L 62   ALT (SGPT) U/L 21   AST (SGOT) U/L 18   GLUCOSE mg/dL 165*     Results from last 7 days   Lab Units 08/10/24  2329 08/10/24  1920   WBC 10*3/mm3 12.43* 12.05*   HEMOGLOBIN g/dL 12.7* 13.6   HEMATOCRIT % 36.9* 39.8   PLATELETS 10*3/mm3 293 315     Results from last 7 days   Lab Units 08/10/24  2329   INR  1.15*     Results from last 7 days   Lab Units 08/10/24  1920   HSTROP T ng/L 12     Results from last 7 days   Lab Units 08/10/24  1920   PROBNP pg/mL 671.9                 Results from last 7 days   Lab Units 08/10/24  1949 08/08/24  1601   COLOR UA  Yellow Dark Yellow   SPECIFIC GRAVITY, URINE   --  1.010   GLUCOSE UA  Negative  --    KETONES UA  Trace* Negative   BLOOD UA  Negative  --    LEUKOCYTES UA  Trace* Negative   PH, URINE  5.5 7.5   BILIRUBIN UA  Negative Negative   UROBILINOGEN UA  0.2 E.U./dL Normal   RBC UA /HPF 0-2  --    WBC UA /HPF 3-5*  --        Pain Management Panel           No data to display                EKG:      Sinus rhythm, rate of 75, sinus arrhythmia present.  No acute ST segment changes.    Radiology:    CT Angiogram Chest Pulmonary Embolism    Addendum Date: 8/10/2024    ADDENDUM REPORT ADDENDUM: This report was discussed with Dr. Cortez on Aug 10, 2024 22:29:00 EDT. Authenticated and Electronically Signed by Yaron Mathis MD on 08/10/2024 10:29:42 PM    Result Date: 8/10/2024  FINAL REPORT TECHNIQUE: null CLINICAL HISTORY: RT Leg cramping, concern for blood clot, weakness, lethargic; R/O PULMONARUY EMBOLISM COMPARISON: null FINDINGS: CT Angiography Chest W Contrast 3D Postprocessing COMPARISON: CT/SR - CT CHEST W CONTRAST - 06/10/2024 03:53 PM EDT FINDINGS: Occlusive bilateral lower lobe subsegmental pulmonary emboli. No thoracic aortic aneurysm or dissection. No consolidation. Calcified right lower lobe granuloma. Stable 6 mm left lower lobe pulmonary nodule (series 4, image 71). No pleural effusion. No  pneumothorax. No cardiomegaly. No pericardial effusion. RV/LV Ratio: 1.55. No pathologically enlarged lymph nodes. No acute fracture. Small hiatal hernia.     IMPRESSION: Bilateral lower lobe subsegmental pulmonary emboli. Possible right heart strain. Stable left lower lobe pulmonary nodule. Recommend follow-up chest CT in 6-12 months per Fleischner Society guidelines. See separate Abdomen/Pelvis CT report. Authenticated and Electronically Signed by Yaron Mathis MD on 08/10/2024 10:27:30 PM    CT Abdomen Pelvis With Contrast    Addendum Date: 8/10/2024    ADDENDUM REPORT ADDENDUM: Receipt of this report by the clinical staff was confirmed with Sarah Marlow RN on Aug 10, 2024 22:04:00 EDT. Authenticated and Electronically Signed by Yaron Mathis MD on 08/10/2024 10:04:36 PM    Result Date: 8/10/2024  FINAL REPORT TECHNIQUE: null CLINICAL HISTORY: Bilateral LOWER QUAD ABDOMINAL PAIN COMPARISON: null FINDINGS: CT Abdomen and Pelvis W Contrast COMPARISON: None FINDINGS: Small hiatal hernia. Normal liver. Normal spleen. Right renal cysts measuring up to 10.5 cm. Unremarkable left kidney. No hydronephrosis. Normal adrenal glands. Normal pancreas. No visible gallstones. No biliary dilation. Colonic diverticulosis without evidence of acute diverticulitis. No mucosal thickening. No evidence of obstruction. Normal appendix. Unremarkable bladder. Enlarged prostate indenting the bladder, with right lateral cyst measuring 1.9 cm. Left superolateral prostate is hypodense (for example series 5, image 57). No ascites. No pneumoperitoneum. No lymphadenopathy. No acute fracture. Degenerative changes in the spine. No abdominal aortic aneurysm.     IMPRESSION: No acute findings. Abnormal prostate. Underlying neoplasm cannot be excluded. Recommend urology referral if not worked up in the past. Nonemergent/incidental findings above. Authenticated and Electronically Signed by Yaron Mathis MD on 08/10/2024 09:51:12 PM  "    Assessment:    Bilateral pulmonary emboli  Rule out right heart strain  Hypertension  Pulmonary nodules    Plan:    Bilateral pulmonary emboli  Rule out right heart strain  Heparin drip, no tPA given  Dr. Ferreira, cardiologist, was contacted in the ED with respect to \"possible right heart strain \" on the CTA chest.  Recommended proceeding with medical management and repeat echo in AM.  Patient is not tachycardic, not requiring oxygen.  Hypertension    Risk Assessment: High  DVT Prophylaxis: Heparin drip  Code Status: Full code  Diet: Cardiac      Darryl Regalado DO  08/11/24  00:21 EDT    Dictated utilizing Dragon dictation.   "

## 2024-08-11 NOTE — CASE MANAGEMENT/SOCIAL WORK
Discharge Planning Assessment   [facility]     Patient Name: Carmelo Arnold  MRN: [id number]  Today's Date: 8/11/2024    Admit Date: 8/10/2024    Plan: Home with family   Discharge Needs Assessment       Row Name 08/11/24 0903       Living Environment    People in Home spouse    Current Living Arrangements home    Potentially Unsafe Housing Conditions none    In the past 12 months has the electric, gas, oil, or water company threatened to shut off services in your home? No    Primary Care Provided by self    Provides Primary Care For no one    Family Caregiver if Needed spouse    Quality of Family Relationships involved    Able to Return to Prior Arrangements yes       Resource/Environmental Concerns    Resource/Environmental Concerns none    Transportation Concerns none       Transportation Needs    In the past 12 months, has lack of transportation kept you from medical appointments or from getting medications? no    In the past 12 months, has lack of transportation kept you from meetings, work, or from getting things needed for daily living? No       Food Insecurity    Within the past 12 months, you worried that your food would run out before you got the money to buy more. Never true    Within the past 12 months, the food you bought just didn't last and you didn't have money to get more. Never true       Transition Planning    Patient/Family Anticipates Transition to home with family    Patient/Family Anticipated Services at Transition none    Transportation Anticipated car, drives self;family or friend will provide       Discharge Needs Assessment    Readmission Within the Last 30 Days no previous admission in last 30 days    Equipment Currently Used at Home none    Concerns to be Addressed no discharge needs identified    Anticipated Changes Related to Illness none    Equipment Needed After Discharge none    Provided Post Acute Provider List? N/A    Provided Post Acute Provider Quality & Resource List? N/A                    Discharge Plan       Row Name 08/11/24 0905       Plan    Plan Home with family    Patient/Family in Agreement with Plan yes    Plan Comments Spoke to pt regarding discharge plans Confirmed  address.phone number Primary provider as being correct on face sheet .Independent with ADLS .Uses [facility] Declining Meds to bed at this time Denies any needs                  Continued Care and Services - Admitted Since 8/10/2024    No active coordination exists for this encounter.          Demographic Summary       Row Name 08/11/24 0900       General Information    Admission Type observation    Required Notices Provided Observation Status Notice    Referral Source admission list    Preferred Language English                   Functional Status       Row Name 08/11/24 0901       Functional Status    Usual Activity Tolerance good       Physical Activity    On average, how many days per week do you engage in moderate to strenuous exercise (like a brisk walk)? 0 days    On average, how many minutes do you engage in exercise at this level? 0 min    Number of minutes of exercise per week 0       Functional Status, IADL    Medications independent    Meal Preparation independent    Housekeeping independent    Laundry independent    Shopping independent       Mental Status    General Appearance WDL WDL                   Psychosocial    No documentation.                  Abuse/Neglect    No documentation.                  Legal    No documentation.                  Substance Abuse    No documentation.                  Patient Forms    No documentation.                     Lizzette Carmona RN

## 2024-08-11 NOTE — CASE MANAGEMENT/SOCIAL WORK
Case Management Discharge Note           Provided Post Acute Provider List?: N/A  Provided Post Acute Provider Quality & Resource List?: N/A    Selected Continued Care - Admitted Since 8/10/2024       Destination    No services have been selected for the patient.                Durable Medical Equipment    No services have been selected for the patient.                Dialysis/Infusion    No services have been selected for the patient.                Home Medical Care    No services have been selected for the patient.                Therapy    No services have been selected for the patient.                Community Resources    No services have been selected for the patient.                Community & DME    No services have been selected for the patient.                    Transportation Services  Private: Car    Final Discharge Disposition Code: 01 - home or self-care

## 2024-08-12 ENCOUNTER — TRANSITIONAL CARE MANAGEMENT TELEPHONE ENCOUNTER (OUTPATIENT)
Dept: CALL CENTER | Facility: HOSPITAL | Age: 69
End: 2024-08-12
Payer: MEDICARE

## 2024-08-12 LAB
ASCENDING AORTA: 3.5 CM
BH CV ECHO MEAS - AO MAX PG: 6.3 MMHG
BH CV ECHO MEAS - AO MEAN PG: 3 MMHG
BH CV ECHO MEAS - AO ROOT DIAM: 3.4 CM
BH CV ECHO MEAS - AO V2 MAX: 125 CM/SEC
BH CV ECHO MEAS - AO V2 VTI: 28.6 CM
BH CV ECHO MEAS - AVA(I,D): 2.5 CM2
BH CV ECHO MEAS - EDV(CUBED): 86.9 ML
BH CV ECHO MEAS - EDV(MOD-SP2): 86.8 ML
BH CV ECHO MEAS - EDV(MOD-SP4): 79.2 ML
BH CV ECHO MEAS - EF(MOD-BP): 65.3 %
BH CV ECHO MEAS - EF(MOD-SP2): 65.2 %
BH CV ECHO MEAS - EF(MOD-SP4): 65.7 %
BH CV ECHO MEAS - EF_3D-VOL: 63 %
BH CV ECHO MEAS - ESV(CUBED): 19.2 ML
BH CV ECHO MEAS - ESV(MOD-SP2): 30.2 ML
BH CV ECHO MEAS - ESV(MOD-SP4): 27.2 ML
BH CV ECHO MEAS - FS: 39.5 %
BH CV ECHO MEAS - IVS/LVPW: 0.78 CM
BH CV ECHO MEAS - IVSD: 0.91 CM
BH CV ECHO MEAS - LA DIMENSION: 3.7 CM
BH CV ECHO MEAS - LAT PEAK E' VEL: 13.7 CM/SEC
BH CV ECHO MEAS - LV DIASTOLIC VOL/BSA (35-75): 36.9 CM2
BH CV ECHO MEAS - LV MASS(C)D: 157.8 GRAMS
BH CV ECHO MEAS - LV MAX PG: 3.9 MMHG
BH CV ECHO MEAS - LV MEAN PG: 2 MMHG
BH CV ECHO MEAS - LV SYSTOLIC VOL/BSA (12-30): 12.7 CM2
BH CV ECHO MEAS - LV V1 MAX: 98.3 CM/SEC
BH CV ECHO MEAS - LV V1 VTI: 20.8 CM
BH CV ECHO MEAS - LVIDD: 4.4 CM
BH CV ECHO MEAS - LVIDS: 2.7 CM
BH CV ECHO MEAS - LVOT AREA: 3.4 CM2
BH CV ECHO MEAS - LVOT DIAM: 2.09 CM
BH CV ECHO MEAS - LVPWD: 1.17 CM
BH CV ECHO MEAS - MED PEAK E' VEL: 8.7 CM/SEC
BH CV ECHO MEAS - MV A MAX VEL: 68.6 CM/SEC
BH CV ECHO MEAS - MV DEC SLOPE: 456 CM/SEC2
BH CV ECHO MEAS - MV DEC TIME: 0.19 SEC
BH CV ECHO MEAS - MV E MAX VEL: 87.4 CM/SEC
BH CV ECHO MEAS - MV E/A: 1.27
BH CV ECHO MEAS - MV MAX PG: 3.5 MMHG
BH CV ECHO MEAS - MV MEAN PG: 2 MMHG
BH CV ECHO MEAS - MV V2 VTI: 26.9 CM
BH CV ECHO MEAS - MVA(VTI): 2.7 CM2
BH CV ECHO MEAS - PA ACC TIME: 0.13 SEC
BH CV ECHO MEAS - PA V2 MAX: 109 CM/SEC
BH CV ECHO MEAS - RAP SYSTOLE: 3 MMHG
BH CV ECHO MEAS - RV MAX PG: 1.52 MMHG
BH CV ECHO MEAS - RV V1 MAX: 61.6 CM/SEC
BH CV ECHO MEAS - RV V1 VTI: 12.6 CM
BH CV ECHO MEAS - SV(LVOT): 71.4 ML
BH CV ECHO MEAS - SV(MOD-SP2): 56.6 ML
BH CV ECHO MEAS - SV(MOD-SP4): 52 ML
BH CV ECHO MEAS - SVI(LVOT): 33.3 ML/M2
BH CV ECHO MEAS - SVI(MOD-SP2): 26.4 ML/M2
BH CV ECHO MEAS - SVI(MOD-SP4): 24.3 ML/M2
BH CV ECHO MEAS - TAPSE (>1.6): 3.1 CM
BH CV ECHO MEASUREMENTS AVERAGE E/E' RATIO: 7.8
BH CV XLRA - RV BASE: 3.9 CM
BH CV XLRA - RV LENGTH: 8.3 CM
BH CV XLRA - RV MID: 4.1 CM
BH CV XLRA - TDI S': 12.1 CM/SEC
LEFT ATRIUM VOLUME INDEX: 27.7 ML/M2

## 2024-08-12 NOTE — OUTREACH NOTE
Call Center TCM Note      Flowsheet Row Responses   [facility] patient discharged from? [facility]   Does the patient have one of the following disease processes/diagnoses(primary or secondary)? Other   TCM attempt successful? Yes  [VR-Spouse]   Call start time 1237   Call end time 1241   Meds reviewed with patient/caregiver? Yes   Is the patient having any side effects they believe may be caused by any medication additions or changes? No   Does the patient have all medications ordered at discharge? Yes   Is the patient taking all medications as directed (includes completed medication regime)? Yes   Comments [facility] d/c follow up 8/14/24@1030.   Does the patient have an appointment with their PCP within 7-14 days of discharge? Yes   Has home health visited the patient within 72 hours of discharge? N/A   Psychosocial issues? No   Did the patient receive a copy of their discharge instructions? Yes   Nursing interventions Reviewed instructions with patient   What is the patient's perception of their health status since discharge? Improving   Is the patient/caregiver able to teach back signs and symptoms related to disease process for when to call PCP? Yes   Is the patient/caregiver able to teach back signs and symptoms related to disease process for when to call 911? Yes   Is the patient/caregiver able to teach back the hierarchy of who to call/visit for symptoms/problems? PCP, Specialist, Home health nurse, Urgent Care, ED, 911 Yes   If the patient is a current smoker, are they able to teach back resources for cessation? Not a smoker   TCM call completed? Yes   Call end time 1241   Would this patient benefit from a Referral to Amb Social Work? No   Is the patient interested in additional calls from an ambulatory ? No            Rissa Francisco RN    8/12/2024, 12:41 EDT

## 2024-08-14 ENCOUNTER — OFFICE VISIT (OUTPATIENT)
Dept: INTERNAL MEDICINE | Facility: CLINIC | Age: 69
End: 2024-08-14
Payer: MEDICARE

## 2024-08-14 VITALS
BODY MASS INDEX: 27.08 KG/M2 | OXYGEN SATURATION: 100 % | HEART RATE: 54 BPM | HEIGHT: 74 IN | WEIGHT: 211 LBS | TEMPERATURE: 97.7 F | DIASTOLIC BLOOD PRESSURE: 64 MMHG | SYSTOLIC BLOOD PRESSURE: 124 MMHG

## 2024-08-14 DIAGNOSIS — I26.99 BILATERAL PULMONARY EMBOLISM: ICD-10-CM

## 2024-08-14 DIAGNOSIS — I49.8 SINUS ARRHYTHMIA: ICD-10-CM

## 2024-08-14 DIAGNOSIS — N41.1 CHRONIC PROSTATITIS: ICD-10-CM

## 2024-08-14 DIAGNOSIS — Z09 HOSPITAL DISCHARGE FOLLOW-UP: Primary | ICD-10-CM

## 2024-08-14 PROBLEM — R33.8 ACUTE RETENTION OF URINE: Status: ACTIVE | Noted: 2024-07-20

## 2024-08-14 PROBLEM — E78.00 PURE HYPERCHOLESTEROLEMIA: Status: ACTIVE | Noted: 2024-08-14

## 2024-08-14 LAB
PSA SERPL-MCNC: 28.4 NG/ML (ref 0–4)
QT INTERVAL: 414 MS
QTC INTERVAL: 420 MS

## 2024-08-14 NOTE — PROGRESS NOTES
Transitional Care Follow Up Visit  Subjective     Carmelo Arnold is a 68 y.o. male who presents for a transitional care management visit.    Within 48 business hours after discharge our office contacted him via telephone to coordinate his care and needs.      I reviewed and discussed the details of that call along with the discharge summary, hospital problems, inpatient lab results, inpatient diagnostic studies, and consultation reports with Carmelo.     Current outpatient and discharge medications have been reconciled for the patient.  Reviewed by: SARAH Cabezas          8/11/2024     3:30 PM   Date of TCM Phone Call   [facility]   Date of Admission 8/10/2024   Date of Discharge 8/11/2024   Discharge Disposition Home or Self Care     Risk for Readmission (LACE) Score: 1 (8/11/2024  6:00 AM)      History of Present Illness   Course During Hospital Stay: Admitted to [facility] on 8/10 with acute complaints of fatigue.  CT angiogram revealed bilateral PE.  He was started on anticoagulation and had Doppler ultrasound of bilateral lower extremities which showed no DVT.  Labs were stable during his hospitalization.  During his hospitalization he also had complaints of prostate issues.  Difficulty emptying and retention.  He  was placed on cefuroxime and encouraged to follow-up with urology.  Previously saw Dr. Pandya.  Diagnostic PSA has been trending upwards.  He has had MRI of the prostate in the past.  The symptoms did resolve prior to discharge.  He was transition to oral apixaban and discharged home in stable condition on 8/11 with instruction to follow-up with PCP.     Since his discharge he reports he is stable.  He is taking medication as prescribed and denies any abnormal bleeding.  He did not have hypercoagulable workup as he had been traveling prior to symptom onset and did have some cramping in the right lower extremity.  No prior history of blood clots or family history of blood clots.   He is established with cardiology Dr. Mcqueen, he is status post cardiac stent.  No history of atrial fibrillation, he is on metoprolol.  He wants to change urology providers and has an appointment with Dr. Luz with urology, has follow-up appointment tomorrow morning.  He has no acute complaints or needs from primary care today.      ECG 12 Lead    Date/Time: 8/14/2024 1:11 PM  Performed by: Hortensia Huynh APRN    Authorized by: Hortensia Huynh APRN  Rhythm: sinus bradycardia and sinus arrhythmia  BPM: 54  Conduction: conduction normal  ST Segments: ST segments normal  T Waves: T waves normal  QRS axis: normal    Clinical impression: non-specific ECG        The following portions of the patient's history were reviewed and updated as appropriate: allergies, current medications, past family history, past medical history, past social history, past surgical history, and problem list.    Review of Systems   Constitutional:  Positive for fatigue. Negative for appetite change and fever.   HENT:  Negative for sore throat and trouble swallowing.    Eyes:  Negative for visual disturbance.   Respiratory:  Negative for cough, shortness of breath and wheezing.    Cardiovascular:  Negative for chest pain, palpitations and leg swelling.   Gastrointestinal:  Negative for abdominal pain, blood in stool, constipation, diarrhea, nausea and vomiting.   Endocrine: Negative for polydipsia, polyphagia and polyuria.   Genitourinary:  Positive for difficulty urinating, frequency and urgency. Negative for dysuria.   Musculoskeletal:  Negative for arthralgias, back pain and myalgias.   Skin:  Negative for rash.   Neurological:  Negative for dizziness, weakness and headaches.   Psychiatric/Behavioral:  Negative for sleep disturbance and suicidal ideas. The patient is not nervous/anxious.        Objective   /64 (BP Location: Right arm, Patient Position: Sitting, Cuff Size: Large Adult)   Pulse 54   Temp 97.7 °F (36.5 °C)   Ht  "188 cm (74\")   Wt 95.7 kg (211 lb)   SpO2 100%   BMI 27.09 kg/m²   Physical Exam  Vitals and nursing note reviewed.   Constitutional:       General: He is not in acute distress.     Appearance: Normal appearance. He is not toxic-appearing.   Eyes:      Pupils: Pupils are equal, round, and reactive to light.   Neck:      Vascular: No carotid bruit.   Cardiovascular:      Rate and Rhythm: Normal rate. Rhythm irregular.      Heart sounds: Normal heart sounds. No murmur heard.  Pulmonary:      Effort: Pulmonary effort is normal. No respiratory distress.      Breath sounds: Normal breath sounds. No wheezing.   Abdominal:      General: Bowel sounds are normal. There is no distension.      Palpations: Abdomen is soft.      Tenderness: There is no abdominal tenderness.   Musculoskeletal:         General: Normal range of motion.      Cervical back: Neck supple. No tenderness.      Right lower leg: No edema.      Left lower leg: No edema.   Skin:     General: Skin is warm and dry.      Findings: No rash.   Neurological:      General: No focal deficit present.      Mental Status: He is alert.   Psychiatric:         Mood and Affect: Mood normal.         Behavior: Behavior normal.         Assessment & Plan   Diagnoses and all orders for this visit:    1. Hospital discharge follow-up (Primary)  Labs, imaging, and notes from recent hospitalization reviewed and discussed today. Recommend follow-up with cardiology to discuss holter studies.   2. Chronic prostatitis  Has scheduled appointment with urology tomorrow, advised to keep. Stressed importance, continue flomax which has helped.   3. Bilateral pulmonary embolism  Continue apixiban 5 mg BID for at least 3 months. Recommend following up with established cardiologist to discuss holter studies, no atrial fibrillation on EKG in office today. Consider discontinuation of apixiban in 3 months if atrial fibrillation ruled out. Hypercoagulable work up deferred for now, some readings " may be falsely impacted by oral anticoagulation.    4. Sinus arrhythmia  Irregular rhythm on exam, EKG consistent with above. Follow-up with cardiology.   Other orders  -     ECG 12 Lead        Keep scheduled follow-up

## 2024-08-14 NOTE — PROGRESS NOTES
Please call patient and tell him based on this recent PSA I would like to see him in the office to talk about a repeat MRI.  Tell him I also now have procedures that we can do like HoLEP, to remove a prostate as big as his without making an incision, and I can discuss this with him as well.

## 2024-09-24 ENCOUNTER — LAB (OUTPATIENT)
Dept: LAB | Facility: HOSPITAL | Age: 69
End: 2024-09-24
Payer: MEDICARE

## 2024-09-24 ENCOUNTER — OFFICE VISIT (OUTPATIENT)
Dept: GASTROENTEROLOGY | Facility: CLINIC | Age: 69
End: 2024-09-24
Payer: MEDICARE

## 2024-09-24 VITALS
WEIGHT: 216 LBS | DIASTOLIC BLOOD PRESSURE: 74 MMHG | SYSTOLIC BLOOD PRESSURE: 115 MMHG | BODY MASS INDEX: 27.72 KG/M2 | TEMPERATURE: 98.2 F | HEIGHT: 74 IN | HEART RATE: 68 BPM

## 2024-09-24 DIAGNOSIS — N28.9 RENAL INSUFFICIENCY: ICD-10-CM

## 2024-09-24 DIAGNOSIS — I26.92 ACUTE SADDLE PULMONARY EMBOLISM WITHOUT ACUTE COR PULMONALE: ICD-10-CM

## 2024-09-24 DIAGNOSIS — R93.5 ABNORMAL CT OF THE ABDOMEN: ICD-10-CM

## 2024-09-24 DIAGNOSIS — Z86.0100 PERSONAL HISTORY OF COLONIC POLYPS: Primary | ICD-10-CM

## 2024-09-24 DIAGNOSIS — K21.9 GASTROESOPHAGEAL REFLUX DISEASE WITHOUT ESOPHAGITIS: ICD-10-CM

## 2024-09-24 DIAGNOSIS — R39.12 BENIGN PROSTATIC HYPERPLASIA WITH WEAK URINARY STREAM: ICD-10-CM

## 2024-09-24 DIAGNOSIS — N40.1 BENIGN PROSTATIC HYPERPLASIA WITH WEAK URINARY STREAM: ICD-10-CM

## 2024-09-24 LAB — PSA SERPL-MCNC: 5.89 NG/ML (ref 0–4)

## 2024-09-24 PROCEDURE — 3078F DIAST BP <80 MM HG: CPT | Performed by: INTERNAL MEDICINE

## 2024-09-24 PROCEDURE — 36415 COLL VENOUS BLD VENIPUNCTURE: CPT

## 2024-09-24 PROCEDURE — 99204 OFFICE O/P NEW MOD 45 MIN: CPT | Performed by: INTERNAL MEDICINE

## 2024-09-24 PROCEDURE — 1160F RVW MEDS BY RX/DR IN RCRD: CPT | Performed by: INTERNAL MEDICINE

## 2024-09-24 PROCEDURE — 1159F MED LIST DOCD IN RCRD: CPT | Performed by: INTERNAL MEDICINE

## 2024-09-24 PROCEDURE — 84153 ASSAY OF PSA TOTAL: CPT

## 2024-09-24 PROCEDURE — 3074F SYST BP LT 130 MM HG: CPT | Performed by: INTERNAL MEDICINE

## 2024-09-24 PROCEDURE — 80048 BASIC METABOLIC PNL TOTAL CA: CPT

## 2024-09-24 RX ORDER — SODIUM CHLORIDE 9 MG/ML
70 INJECTION, SOLUTION INTRAVENOUS CONTINUOUS PRN
OUTPATIENT
Start: 2024-09-24

## 2024-09-24 RX ORDER — FINASTERIDE 5 MG/1
5 TABLET, FILM COATED ORAL DAILY
COMMUNITY

## 2024-09-24 RX ORDER — SODIUM, POTASSIUM,MAG SULFATES 17.5-3.13G
2 SOLUTION, RECONSTITUTED, ORAL ORAL ONCE
Qty: 354 ML | Refills: 0 | Status: SHIPPED | OUTPATIENT
Start: 2024-09-24 | End: 2024-09-24

## 2024-09-24 NOTE — H&P (VIEW-ONLY)
New Patient Consult      Date: 2024   Patient Name: Carmelo Arnold  MRN: [id number]  : 1955     Referring Physician: No ref. provider found    Chief Complaint   Patient presents with    Colon Polyps    Anemia     ?    Referral reason: Needs colonoscopy and EGD       History of Present Illness: Carmelo Arnold is a 69 y.o. male who is here today to establish care with Gastroenterology for to schedule EGD and surveillance colonoscopy.     Patient denies any abdominal pain, change in bowel habit, blood in the stool.  He does have intermittent mild reflux symptoms for which she takes Tums as needed.  He states that he gets it once in few weeks especially depending on what he eats.  No dysphagia.  His last EGD was many years ago details unknown.  Last colonoscopy was in  at [place] and had a adenomatous polyps removed.  No family history of any colon cancer or GI malignancy.  He had a recent PE and is concerned whether he has any occult neoplastic process.  He is a non-smoker and nonalcoholic.    He had a urinary retention while he was on vacation in 2024.  He had to have a Cordero catheter at outside facility.  Subsequently on 8/10/2024 he had to came to ED for leg edema suspicious for DVT which was negative.  However CT angio done revealed a subsegmental PE bilateral.  Venous Doppler done for lower extremity on 2024 did not reveal any lower extremity DVT.  CT abdomen pelvis with contrast for lower abdominal pain done on 8/10/2024 revealed a large renal cyst, colonic diverticulosis without diverticulitis and enlarged prostate indenting the bladder with a right lateral cyst measuring 1.9 cm  Left superolateral prostate is hypodense and underlying neoplasia cannot be ruled out.  Recommended urology referral.  Patient subsequently had a urology OP visit at [facility] on 8/15/2024 and was seen by Dr. Jan Luz and awaiting follow-up in 3 months.    He is currently on aspirin  and the Eliquis.  Eliquis was started in August after he was diagnosed with PE.    Subjective      Past Medical History:   Past Medical History:   Diagnosis Date    Acid reflux     Benign colon polyp 01/12/2021    Benign prostatic hyperplasia ?    Colon polyp     Colon polyp     Coronary artery disease involving native coronary artery of native heart with refractory angina pectoris 03/20/2023    Erectile dysfunction     Heart murmur     HL (hearing loss)     Primary hypertension 12/02/2022    Snores     Urinary tract infection ?       Past Surgical History:   Past Surgical History:   Procedure Laterality Date    CARDIAC CATHETERIZATION N/A 3/28/2023    Procedure: Coronary angiography;  Surgeon: Gabe Mcqueen MD;  Location:  [facility];  Service: Cardiology;  Laterality: N/A;    CARDIAC CATHETERIZATION N/A 4/13/2023    Procedure:  PCI of the LAD;  Surgeon: Kevin Cortez IV, MD;  Location:  [facility];  Service: Cardiovascular;  Laterality: N/A;    CARDIAC CATHETERIZATION N/A 4/13/2023    Procedure: Optical Coherent Tomography;  Surgeon: Kevin Cortez IV, MD;  Location:  [facility];  Service: Cardiovascular;  Laterality: N/A;    CARDIAC CATHETERIZATION N/A 4/13/2023    Procedure: Left Heart Cath;  Surgeon: Kevin Cortez IV, MD;  Location:  [facility];  Service: Cardiovascular;  Laterality: N/A;    COLONOSCOPY         Family History:   Family History   Problem Relation Age of Onset    Arthritis Mother     Breast cancer Sister     Migraines Daughter     Colon cancer Neg Hx        Social History:   Social History     Socioeconomic History    Marital status:    Tobacco Use    Smoking status: Never     Passive exposure: Past    Smokeless tobacco: Never   Vaping Use    Vaping status: Never Used   Substance and Sexual Activity    Alcohol use: Yes     Comment: rarely drink    Drug use: Never    Sexual  activity: Defer         Current Outpatient Medications:     Apixaban Starter Pack (Eliquis DVT/PE Starter Pack) tablet therapy pack, Take two 5 mg tablets by mouth every 12 hours for 7 days. Followed by one 5 mg tablet every 12 hours. (Dispense starter pack if available), Disp: 222.3 tablet, Rfl: 0    Aspirin 81 MG capsule, Take 81 mg by mouth Daily., Disp: , Rfl:     finasteride (PROSCAR) 5 MG tablet, Take 1 tablet by mouth Daily., Disp: , Rfl:     levothyroxine (SYNTHROID, LEVOTHROID) 88 MCG tablet, TAKE 1 TABLET BY MOUTH EVERY DAY (Patient taking differently: Take 1 tablet by mouth Daily. TAKE 1 TABLET BY MOUTH EVERY DAY), Disp: 90 tablet, Rfl: 3    lisinopril-hydrochlorothiazide (PRINZIDE,ZESTORETIC) 10-12.5 MG per tablet, TAKE 1 TABLET BY MOUTH DAILY, Disp: 90 tablet, Rfl: 2    loratadine (CLARITIN) 10 MG tablet, Take 1 tablet by mouth Daily., Disp: , Rfl:     metoprolol succinate XL (TOPROL-XL) 25 MG 24 hr tablet, TAKE 1 TABLET BY MOUTH DAILY, Disp: 90 tablet, Rfl: 3    rosuvastatin (CRESTOR) 10 MG tablet, Take 1 tablet by mouth Daily., Disp: 90 tablet, Rfl: 3    vitamin B-12 (CYANOCOBALAMIN) 1000 MCG tablet, Take 1 tablet by mouth Daily., Disp: , Rfl:     sodium-potassium-magnesium sulfates (Suprep Bowel Prep Kit) 17.5-3.13-1.6 GM/177ML solution oral solution, Take 2 bottles by mouth 1 (One) Time for 1 dose. Please see prep instructions from office., Disp: 354 mL, Rfl: 0    No Known Allergies    Review of Systems:   Review of Systems   Constitutional:  Negative for appetite change, fatigue, fever and unexpected weight loss.   HENT:  Negative for trouble swallowing.    Gastrointestinal:  Positive for GERD. Negative for abdominal distention, abdominal pain, anal bleeding, blood in stool, constipation, diarrhea, nausea, rectal pain, vomiting and indigestion.       The following portions of the patient's history were reviewed and updated as appropriate: allergies, current medications, past family history, past  "medical history, past social history, past surgical history and problem list.    Objective     Physical Exam:  Vital Signs:   Vitals:    09/24/24 1526   BP: 115/74   Pulse: 68   Temp: 98.2 °F (36.8 °C)   TempSrc: Infrared   Weight: 98 kg (216 lb)  Comment: with clothing/shoes   Height: 188 cm (74\")  Comment: per patient.       Physical Exam  Constitutional:       Appearance: Normal appearance.   HENT:      Head: Normocephalic and atraumatic.   Eyes:      Conjunctiva/sclera: Conjunctivae normal.   Abdominal:      General: Abdomen is flat. There is no distension.      Palpations: There is no mass.      Tenderness: There is no abdominal tenderness. There is no guarding or rebound.      Hernia: No hernia is present.   Musculoskeletal:      Cervical back: Normal range of motion and neck supple.   Neurological:      Mental Status: He is alert.           Results Review:   I have reviewed the patient's new clinical and imaging results and agree with the interpretation.     Orders Only on 08/13/2024   Component Date Value Ref Range Status    PSA 08/13/2024 28.4 (H)  0.0 - 4.0 ng/mL Final    Comment: Roche ECLIA methodology.  According to the American Urological Association, Serum PSA should  decrease and remain at undetectable levels after radical  prostatectomy. The AUA defines biochemical recurrence as an initial  PSA value 0.2 ng/mL or greater followed by a subsequent confirmatory  PSA value 0.2 ng/mL or greater.  Values obtained with different assay methods or kits cannot be used  interchangeably. Results cannot be interpreted as absolute evidence  of the presence or absence of malignant disease.     Admission on 08/10/2024, Discharged on 08/11/2024   Component Date Value Ref Range Status    Glucose 08/10/2024 165 (H)  65 - 99 mg/dL Final    BUN 08/10/2024 22  8 - 23 mg/dL Final    Creatinine 08/10/2024 1.33 (H)  0.76 - 1.27 mg/dL Final    Sodium 08/10/2024 141  136 - 145 mmol/L Final    Potassium 08/10/2024 3.9  3.5 - " 5.2 mmol/L Final    Chloride 08/10/2024 102  98 - 107 mmol/L Final    CO2 08/10/2024 23.7  22.0 - 29.0 mmol/L Final    Calcium 08/10/2024 8.8  8.6 - 10.5 mg/dL Final    Total Protein 08/10/2024 7.2  6.0 - 8.5 g/dL Final    Albumin 08/10/2024 3.9  3.5 - 5.2 g/dL Final    ALT (SGPT) 08/10/2024 21  1 - 41 U/L Final    AST (SGOT) 08/10/2024 18  1 - 40 U/L Final    Alkaline Phosphatase 08/10/2024 62  39 - 117 U/L Final    Total Bilirubin 08/10/2024 0.3  0.0 - 1.2 mg/dL Final    Globulin 08/10/2024 3.3  gm/dL Final    A/G Ratio 08/10/2024 1.2  g/dL Final    BUN/Creatinine Ratio 08/10/2024 16.5  7.0 - 25.0 Final    Anion Gap 08/10/2024 15.3 (H)  5.0 - 15.0 mmol/L Final    eGFR 08/10/2024 57.5 (L)  >60.0 mL/min/1.73 Final    HS Troponin T 08/10/2024 12  <22 ng/L Final    Magnesium 08/10/2024 2.3  1.6 - 2.4 mg/dL Final    Color, UA 08/10/2024 Yellow  Yellow, Straw Final    Appearance, UA 08/10/2024 Clear  Clear Final    pH, UA 08/10/2024 5.5  5.0 - 8.0 Final    Specific Gravity, UA 08/10/2024 1.027  1.005 - 1.030 Final    Glucose, UA 08/10/2024 Negative  Negative Final    Ketones, UA 08/10/2024 Trace (A)  Negative Final    Bilirubin, UA 08/10/2024 Negative  Negative Final    Blood, UA 08/10/2024 Negative  Negative Final    Protein, UA 08/10/2024 Trace (A)  Negative Final    Leuk Esterase, UA 08/10/2024 Trace (A)  Negative Final    Nitrite, UA 08/10/2024 Negative  Negative Final    Urobilinogen, UA 08/10/2024 0.2 E.U./dL  0.2 - 1.0 E.U./dL Final    Extra Tube 08/10/2024 Hold for add-ons.   Final    Auto resulted.    Extra Tube 08/10/2024 hold for add-on   Final    Auto resulted    Extra Tube 08/10/2024 Hold for add-ons.   Final    Auto resulted.    Extra Tube 08/10/2024 Hold for add-ons.   Final    Auto resulted    WBC 08/10/2024 12.05 (H)  3.40 - 10.80 10*3/mm3 Final    RBC 08/10/2024 4.58  4.14 - 5.80 10*6/mm3 Final    Hemoglobin 08/10/2024 13.6  13.0 - 17.7 g/dL Final    Hematocrit 08/10/2024 39.8  37.5 - 51.0 % Final     MCV 08/10/2024 86.9  79.0 - 97.0 fL Final    MCH 08/10/2024 29.7  26.6 - 33.0 pg Final    MCHC 08/10/2024 34.2  31.5 - 35.7 g/dL Final    RDW 08/10/2024 14.1  12.3 - 15.4 % Final    RDW-SD 08/10/2024 45.1  37.0 - 54.0 fl Final    MPV 08/10/2024 9.7  6.0 - 12.0 fL Final    Platelets 08/10/2024 315  140 - 450 10*3/mm3 Final    Neutrophil % 08/10/2024 70.7  42.7 - 76.0 % Final    Lymphocyte % 08/10/2024 17.3 (L)  19.6 - 45.3 % Final    Monocyte % 08/10/2024 6.2  5.0 - 12.0 % Final    Eosinophil % 08/10/2024 3.6  0.3 - 6.2 % Final    Basophil % 08/10/2024 0.6  0.0 - 1.5 % Final    Immature Grans % 08/10/2024 1.6 (H)  0.0 - 0.5 % Final    Neutrophils, Absolute 08/10/2024 8.53 (H)  1.70 - 7.00 10*3/mm3 Final    Lymphocytes, Absolute 08/10/2024 2.08  0.70 - 3.10 10*3/mm3 Final    Monocytes, Absolute 08/10/2024 0.75  0.10 - 0.90 10*3/mm3 Final    Eosinophils, Absolute 08/10/2024 0.43 (H)  0.00 - 0.40 10*3/mm3 Final    Basophils, Absolute 08/10/2024 0.07  0.00 - 0.20 10*3/mm3 Final    Immature Grans, Absolute 08/10/2024 0.19 (H)  0.00 - 0.05 10*3/mm3 Final    nRBC 08/10/2024 0.0  0.0 - 0.2 /100 WBC Final    RBC, UA 08/10/2024 0-2  None Seen, 0-2 /HPF Final    WBC, UA 08/10/2024 3-5 (A)  None Seen, 0-2 /HPF Final    Bacteria, UA 08/10/2024 None Seen  None Seen /HPF Final    Squamous Epithelial Cells, UA 08/10/2024 None Seen  None Seen, 0-2 /HPF Final    Hyaline Casts, UA 08/10/2024 0-2  None Seen /LPF Final    Methodology 08/10/2024 Manual Light Microscopy   Final    proBNP 08/10/2024 671.9  0.0 - 900.0 pg/mL Final    Heparin Anti-Xa (UFH) 08/10/2024 0.10 (L)  0.30 - 0.70 IU/ml Final    Protime 08/10/2024 15.3 (H)  12.3 - 15.1 Seconds Final    INR 08/10/2024 1.15 (H)  0.90 - 1.10 Final    PTT 08/10/2024 23.6 (L)  70.0 - 100.0 seconds Final    WBC 08/10/2024 12.43 (H)  3.40 - 10.80 10*3/mm3 Final    RBC 08/10/2024 4.26  4.14 - 5.80 10*6/mm3 Final    Hemoglobin 08/10/2024 12.7 (L)  13.0 - 17.7 g/dL Final    Hematocrit  08/10/2024 36.9 (L)  37.5 - 51.0 % Final    MCV 08/10/2024 86.6  79.0 - 97.0 fL Final    MCH 08/10/2024 29.8  26.6 - 33.0 pg Final    MCHC 08/10/2024 34.4  31.5 - 35.7 g/dL Final    RDW 08/10/2024 14.1  12.3 - 15.4 % Final    RDW-SD 08/10/2024 44.4  37.0 - 54.0 fl Final    MPV 08/10/2024 9.6  6.0 - 12.0 fL Final    Platelets 08/10/2024 293  140 - 450 10*3/mm3 Final    Neutrophil % 08/10/2024 66.3  42.7 - 76.0 % Final    Lymphocyte % 08/10/2024 20.5  19.6 - 45.3 % Final    Monocyte % 08/10/2024 8.0  5.0 - 12.0 % Final    Eosinophil % 08/10/2024 3.4  0.3 - 6.2 % Final    Basophil % 08/10/2024 0.6  0.0 - 1.5 % Final    Immature Grans % 08/10/2024 1.2 (H)  0.0 - 0.5 % Final    Neutrophils, Absolute 08/10/2024 8.24 (H)  1.70 - 7.00 10*3/mm3 Final    Lymphocytes, Absolute 08/10/2024 2.55  0.70 - 3.10 10*3/mm3 Final    Monocytes, Absolute 08/10/2024 0.99 (H)  0.10 - 0.90 10*3/mm3 Final    Eosinophils, Absolute 08/10/2024 0.42 (H)  0.00 - 0.40 10*3/mm3 Final    Basophils, Absolute 08/10/2024 0.08  0.00 - 0.20 10*3/mm3 Final    Immature Grans, Absolute 08/10/2024 0.15 (H)  0.00 - 0.05 10*3/mm3 Final    nRBC 08/10/2024 0.0  0.0 - 0.2 /100 WBC Final    Heparin Anti-Xa (UFH) 08/11/2024 0.59  0.30 - 0.70 IU/ml Final    EF(MOD-bp) 08/11/2024 65.3  % Final    EF_3D-VOL 08/11/2024 63.0  % Final    LVIDd 08/11/2024 4.4  cm Final    LVIDs 08/11/2024 2.7  cm Final    IVSd 08/11/2024 0.91  cm Final    LVPWd 08/11/2024 1.17  cm Final    FS 08/11/2024 39.5  % Final    IVS/LVPW 08/11/2024 0.78  cm Final    ESV(cubed) 08/11/2024 19.2  ml Final    LV Sys Vol (BSA corrected) 08/11/2024 12.7  cm2 Final    EDV(cubed) 08/11/2024 86.9  ml Final    LV Frank Vol (BSA corrected) 08/11/2024 36.9  cm2 Final    LV mass(C)d 08/11/2024 157.8  grams Final    LVOT area 08/11/2024 3.4  cm2 Final    LVOT diam 08/11/2024 2.09  cm Final    EDV(MOD-sp2) 08/11/2024 86.8  ml Final    EDV(MOD-sp4) 08/11/2024 79.2  ml Final    ESV(MOD-sp2) 08/11/2024 30.2  ml Final     ESV(MOD-sp4) 08/11/2024 27.2  ml Final    SV(MOD-sp2) 08/11/2024 56.6  ml Final    SV(MOD-sp4) 08/11/2024 52.0  ml Final    SVi(MOD-SP2) 08/11/2024 26.4  ml/m2 Final    SVi(MOD-SP4) 08/11/2024 24.3  ml/m2 Final    SVi (LVOT) 08/11/2024 33.3  ml/m2 Final    EF(MOD-sp2) 08/11/2024 65.2  % Final    EF(MOD-sp4) 08/11/2024 65.7  % Final    MV E max anthony 08/11/2024 87.4  cm/sec Final    MV A max anthony 08/11/2024 68.6  cm/sec Final    MV dec time 08/11/2024 0.19  sec Final    MV E/A 08/11/2024 1.27   Final    LA ESV Index (BP) 08/11/2024 27.7  ml/m2 Final    Med Peak E' Anthony 08/11/2024 8.7  cm/sec Final    Lat Peak E' Anthony 08/11/2024 13.7  cm/sec Final    Avg E/e' ratio 08/11/2024 7.80   Final    SV(LVOT) 08/11/2024 71.4  ml Final    RV Base 08/11/2024 3.9  cm Final    RV Mid 08/11/2024 4.1  cm Final    RV Length 08/11/2024 8.3  cm Final    TAPSE (>1.6) 08/11/2024 3.1  cm Final    RV S' 08/11/2024 12.1  cm/sec Final    LA dimension (2D)  08/11/2024 3.7  cm Final    LV V1 max 08/11/2024 98.3  cm/sec Final    LV V1 max PG 08/11/2024 3.9  mmHg Final    LV V1 mean PG 08/11/2024 2.00  mmHg Final    LV V1 VTI 08/11/2024 20.8  cm Final    Ao pk anthony 08/11/2024 125.0  cm/sec Final    Ao max PG 08/11/2024 6.3  mmHg Final    Ao mean PG 08/11/2024 3.0  mmHg Final    Ao V2 VTI 08/11/2024 28.6  cm Final    GE(I,D) 08/11/2024 2.50  cm2 Final    MV max PG 08/11/2024 3.5  mmHg Final    MV mean PG 08/11/2024 2.00  mmHg Final    MV V2 VTI 08/11/2024 26.9  cm Final    MVA(VTI) 08/11/2024 2.7  cm2 Final    MV dec slope 08/11/2024 456.0  cm/sec2 Final    RAP systole 08/11/2024 3.0  mmHg Final    RV V1 max PG 08/11/2024 1.52  mmHg Final    RV V1 max 08/11/2024 61.6  cm/sec Final    RV V1 VTI 08/11/2024 12.6  cm Final    PA V2 max 08/11/2024 109.0  cm/sec Final    PA acc time 08/11/2024 0.13  sec Final    Ao root diam 08/11/2024 3.4  cm Final    Ascending aorta 08/11/2024 3.5  cm Final    QT Interval 08/11/2024 414  ms Final    QTC Interval  08/11/2024 420  ms Final   Admission on 08/08/2024, Discharged on 08/08/2024   Component Date Value Ref Range Status    Color 08/08/2024 Dark Yellow   Final    Clarity, UA 08/08/2024 Clear   Final    Glucose, UA 08/08/2024 Negative  mg/dL Final    Bilirubin 08/08/2024 Negative   Final    Ketones, UA 08/08/2024 Negative   Final    Specific Gravity  08/08/2024 1.010  1.005 - 1.030 Final    Blood, UA 08/08/2024 Negative   Final    pH, Urine 08/08/2024 7.5  5.0 - 8.0 Final    Protein, POC 08/08/2024 Negative  mg/dL Final    Urobilinogen, UA 08/08/2024 Normal   Final    Nitrite, UA 08/08/2024 Negative   Final    Leukocytes 08/08/2024 Negative   Final    SARS Antigen 08/08/2024 Not Detected  Not Detected, Presumptive Negative Final    Influenza A Antigen SHEYLA 08/08/2024 Not Detected  Not Detected Final    Influenza B Antigen SHEYLA 08/08/2024 Not Detected  Not Detected Final    Internal Control 08/08/2024 Passed  Passed Final    Lot Number 08/08/2024 [id number]   Final    Expiration Date 08/08/2024 20,525   Final      US Venous Doppler Lower Extremity Bilateral (duplex)    Result Date: 8/11/2024  No evidence of left or right lower extremity DVT.       This report was signed and finalized on 8/11/2024 12:16 PM by Jelani Leiva MD.      XR Chest 1 View    Result Date: 8/11/2024  No acute cardiopulmonary process.    This report was signed and finalized on 8/11/2024 8:36 AM by Jelani Leiva MD.      CT Angiogram Chest Pulmonary Embolism    Addendum Date: 8/10/2024    ADDENDUM REPORT ADDENDUM: This report was discussed with Dr. Cortez on Aug 10, 2024 22:29:00 EDT. Authenticated and Electronically Signed by Yaron Mathis MD on 08/10/2024 10:29:42 PM    Result Date: 8/10/2024  IMPRESSION: Bilateral lower lobe subsegmental pulmonary emboli. Possible right heart strain. Stable left lower lobe pulmonary nodule. Recommend follow-up chest CT in 6-12 months per Fleischner Society guidelines. See separate Abdomen/Pelvis CT report.  Authenticated and Electronically Signed by Yaron Mathis MD on 08/10/2024 10:27:30 PM    CT Abdomen Pelvis With Contrast    Addendum Date: 8/10/2024    ADDENDUM REPORT ADDENDUM: Receipt of this report by the clinical staff was confirmed with Sarah Marlow RN on Aug 10, 2024 22:04:00 EDT. Authenticated and Electronically Signed by Yaron Mathis MD on 08/10/2024 10:04:36 PM    Result Date: 8/10/2024  IMPRESSION: No acute findings. Abnormal prostate. Underlying neoplasm cannot be excluded. Recommend urology referral if not worked up in the past. Nonemergent/incidental findings above. Authenticated and Electronically Signed by Yaron Mathis MD on 08/10/2024 09:51:12 PM     Assessment / Plan      Assessment & Plan:  1. Personal history of colonic polyps  Last colonoscopy was in 2015 and adenomatous polyps removed.  No family history of any colon cancer.  He is overdue for his surveillance colonoscopy will schedule. Due to his significant cardiac history we will keep him on aspirin but hold his Eliquis 2 days before the test.    The indications, technique, alternatives and potential risk and complications were discussed with the patient including but not limited to bleeding, bowel perforations, missing lesions and anesthetic complications. The patient understands and wishes to proceed with the procedure and has given their verbal consent. Written patient education information was given to the patient.   The patient will call if they have further questions before procedure.      - Case Request; Standing  - Implement Anesthesia Orders Day of Procedure; Standing  - Verify NPO; Standing  - Verify Bowel Prep Was Successful; Standing  - Oxygen Therapy- Nasal Cannula; 2 LPM; Standing  - Obtain Informed Consent; Standing  - sodium chloride 0.9 % infusion  - Case Request  - sodium-potassium-magnesium sulfates (Suprep Bowel Prep Kit) 17.5-3.13-1.6 GM/177ML solution oral solution; Take 2 bottles by mouth 1 (One) Time for 1 dose.  Please see prep instructions from office.  Dispense: 354 mL; Refill: 0    2. Gastroesophageal reflux disease without esophagitis  Mild intermittent reflux symptoms.  No prior EGD.  He had a recent unprovoked PE.  Will schedule EGD along with colonoscopy to rule out upper GI pathology, neoplastic process and to rule out Hernandez's.    Reflux diet and antireflux measures discussed  Continue over-the-counter antacids as needed for now    - Case Request; Standing  - Implement Anesthesia Orders Day of Procedure; Standing  - Verify NPO; Standing  - Verify Bowel Prep Was Successful; Standing  - Oxygen Therapy- Nasal Cannula; 2 LPM; Standing  - Obtain Informed Consent; Standing  - sodium chloride 0.9 % infusion  - Case Request    3. Renal insufficiency  4. Benign prostatic hyperplasia with weak urinary stream  5. Abnormal CT of the abdomen  6. Pulmonary embolism-aug 2024  Patient had an unprovoked subsegmental PE.  No obvious predisposing factors identified.  Lower extremity DVT scan was negative.    Known history of BPH over 10 years.  He had multiple MRI scans and last biopsy was in 2022.  His creatinine recently went up to 1.6 likely following obstructive uropathy.  He had a urinary retention and had a temporary Cordero catheter for few days.  His PSA jumped from baseline PSA 9-10 to -28.  This could be from acute urinary retention.  CT scan showing abnormal prostate gland to rule out underlying neoplasia.  He was followed by Dr. Pandya before and he was already seen by Dr. Jan Luz at [facility] this time and awaiting follow-up in December.    - Patient likely need MRI prostate  - PSA Diagnostic; Future  - Basic Metabolic Panel; Future      Follow Up:   Return for Follow Up after procedure.    Mike Esteban MD  [facility]  9/24/2024  16:54 EDT    Please note that portions of this note may have been completed with a voice recognition program.

## 2024-09-25 ENCOUNTER — TELEPHONE (OUTPATIENT)
Dept: INTERNAL MEDICINE | Facility: CLINIC | Age: 69
End: 2024-09-25
Payer: MEDICARE

## 2024-09-25 PROBLEM — K21.9 GASTROESOPHAGEAL REFLUX DISEASE WITHOUT ESOPHAGITIS: Status: ACTIVE | Noted: 2024-09-24

## 2024-09-25 PROBLEM — Z86.0100 PERSONAL HISTORY OF COLONIC POLYPS: Status: ACTIVE | Noted: 2024-09-24

## 2024-09-25 LAB
ANION GAP SERPL CALCULATED.3IONS-SCNC: 13.3 MMOL/L (ref 5–15)
BUN SERPL-MCNC: 22 MG/DL (ref 8–23)
BUN/CREAT SERPL: 16.8 (ref 7–25)
CALCIUM SPEC-SCNC: 9.1 MG/DL (ref 8.6–10.5)
CHLORIDE SERPL-SCNC: 100 MMOL/L (ref 98–107)
CO2 SERPL-SCNC: 23.7 MMOL/L (ref 22–29)
CREAT SERPL-MCNC: 1.31 MG/DL (ref 0.76–1.27)
EGFRCR SERPLBLD CKD-EPI 2021: 58.2 ML/MIN/1.73
GLUCOSE SERPL-MCNC: 93 MG/DL (ref 65–99)
POTASSIUM SERPL-SCNC: 4 MMOL/L (ref 3.5–5.2)
SODIUM SERPL-SCNC: 137 MMOL/L (ref 136–145)

## 2024-10-05 DIAGNOSIS — I26.99 OTHER ACUTE PULMONARY EMBOLISM WITHOUT ACUTE COR PULMONALE: Primary | ICD-10-CM

## 2024-10-07 ENCOUNTER — TELEPHONE (OUTPATIENT)
Dept: GASTROENTEROLOGY | Facility: CLINIC | Age: 69
End: 2024-10-07
Payer: MEDICARE

## 2024-10-07 NOTE — TELEPHONE ENCOUNTER
----- Message from Ingrid LE sent at 10/7/2024  7:59 AM EDT -----    ----- Message -----  From: Mike Esteban MD  Sent: 10/5/2024   5:15 PM EDT  To: Laura Teague [facility]    Let him know that his PSA level has come down now.  I am also referring him to hematology to see whether he has any genetic defects causing the pulmonary embolism.  They should be expecting a call from hematology to schedule an appointment.

## 2024-10-15 ENCOUNTER — OFFICE VISIT (OUTPATIENT)
Dept: CARDIOLOGY | Facility: CLINIC | Age: 69
End: 2024-10-15
Payer: MEDICARE

## 2024-10-15 VITALS
DIASTOLIC BLOOD PRESSURE: 74 MMHG | OXYGEN SATURATION: 95 % | HEIGHT: 74 IN | WEIGHT: 216 LBS | HEART RATE: 54 BPM | SYSTOLIC BLOOD PRESSURE: 110 MMHG | BODY MASS INDEX: 27.72 KG/M2

## 2024-10-15 DIAGNOSIS — I26.94 MULTIPLE SUBSEGMENTAL PULMONARY EMBOLI WITHOUT ACUTE COR PULMONALE: ICD-10-CM

## 2024-10-15 DIAGNOSIS — I26.99 PULMONARY EMBOLISM, UNSPECIFIED CHRONICITY, UNSPECIFIED PULMONARY EMBOLISM TYPE, UNSPECIFIED WHETHER ACUTE COR PULMONALE PRESENT: ICD-10-CM

## 2024-10-15 DIAGNOSIS — I49.1 PAC (PREMATURE ATRIAL CONTRACTION): ICD-10-CM

## 2024-10-15 DIAGNOSIS — I25.10 CORONARY ARTERY DISEASE INVOLVING NATIVE CORONARY ARTERY OF NATIVE HEART WITHOUT ANGINA PECTORIS: Primary | ICD-10-CM

## 2024-10-15 DIAGNOSIS — I26.99 PULMONARY EMBOLISM, UNSPECIFIED CHRONICITY, UNSPECIFIED PULMONARY EMBOLISM TYPE, UNSPECIFIED WHETHER ACUTE COR PULMONALE PRESENT: Primary | ICD-10-CM

## 2024-10-15 DIAGNOSIS — I10 PRIMARY HYPERTENSION: ICD-10-CM

## 2024-10-15 DIAGNOSIS — R06.09 DOE (DYSPNEA ON EXERTION): ICD-10-CM

## 2024-10-15 DIAGNOSIS — E78.5 DYSLIPIDEMIA: ICD-10-CM

## 2024-10-15 PROCEDURE — 99213 OFFICE O/P EST LOW 20 MIN: CPT | Performed by: INTERNAL MEDICINE

## 2024-10-15 PROCEDURE — 3074F SYST BP LT 130 MM HG: CPT | Performed by: INTERNAL MEDICINE

## 2024-10-15 PROCEDURE — 3078F DIAST BP <80 MM HG: CPT | Performed by: INTERNAL MEDICINE

## 2024-10-15 RX ORDER — SODIUM, POTASSIUM,MAG SULFATES 17.5-3.13G
SOLUTION, RECONSTITUTED, ORAL ORAL
COMMUNITY
Start: 2024-09-25 | End: 2024-10-15

## 2024-10-15 NOTE — PROGRESS NOTES
Subjective:     Encounter Date:10/15/2024      Patient ID: Carmelo Arnold is a 69 y.o. male.    Chief Complaint: Shortness of breath  HPI  This is a 69-year-old male patient who was diagnosed with bilateral pulmonary emboli in August.  He has been on anticoagulation therapy with Eliquis since that time.  The patient indicates that he has had dyspnea with exertional activities since that diagnosis.  Previously the patient was discovered to have a chronic total occlusion of his mid LAD.  He underwent successful  PCI by Dr. Timbo Cortez for single-vessel coronary artery disease.  The patient indicates that he was symptom-free from a cardiovascular status until developing acute onset of shortness of breath while moving furniture.  3 weeks prior he had ridden in a car to [place] but had had no prolonged travel or other immobility.  He had not had any previous surgeries.  He has no history of malignancy or prior DVT/PE.  He has had no consultation with pulmonology either as an inpatient or outpatient.  He has had referral to the hematologist for a hypercoagulable workup.  That appointment is in December.  He has had no bleeding complications related to anticoagulation therapy with Eliquis but indicates his co-pay is excessive and would like to explore other less expensive treatment options.  The following portions of the patient's history were reviewed and updated as appropriate: allergies, current medications, past family history, past medical history, past social history, past surgical history and problem  Review of Systems   Constitutional: Negative for chills, diaphoresis, fever, malaise/fatigue, weight gain and weight loss.   HENT:  Negative for ear discharge, hearing loss, hoarse voice and nosebleeds.    Eyes:  Negative for discharge, double vision, pain and photophobia.   Cardiovascular:  Positive for dyspnea on exertion. Negative for chest pain, claudication, cyanosis, irregular heartbeat, leg  swelling, near-syncope, orthopnea, palpitations, paroxysmal nocturnal dyspnea and syncope.   Respiratory:  Negative for cough, hemoptysis, sputum production and wheezing.    Endocrine: Negative for cold intolerance, heat intolerance, polydipsia, polyphagia and polyuria.   Hematologic/Lymphatic: Negative for adenopathy and bleeding problem. Does not bruise/bleed easily.   Skin:  Negative for color change, flushing, itching and rash.   Musculoskeletal:  Negative for muscle cramps, muscle weakness, myalgias and stiffness.   Gastrointestinal:  Negative for abdominal pain, diarrhea, hematemesis, hematochezia, nausea and vomiting.   Genitourinary:  Negative for dysuria, frequency and nocturia.   Neurological:  Negative for focal weakness, loss of balance, numbness, paresthesias and seizures.   Psychiatric/Behavioral:  Negative for altered mental status, hallucinations and suicidal ideas.    Allergic/Immunologic: Negative for HIV exposure, hives and persistent infections.           Current Outpatient Medications:     Apixaban Starter Pack (Eliquis DVT/PE Starter Pack) tablet therapy pack, Take two 5 mg tablets by mouth every 12 hours for 7 days. Followed by one 5 mg tablet every 12 hours. (Dispense starter pack if available), Disp: 222.3 tablet, Rfl: 0    Aspirin 81 MG capsule, Take 81 mg by mouth Daily., Disp: , Rfl:     finasteride (PROSCAR) 5 MG tablet, Take 1 tablet by mouth Daily., Disp: , Rfl:     lisinopril-hydrochlorothiazide (PRINZIDE,ZESTORETIC) 10-12.5 MG per tablet, TAKE 1 TABLET BY MOUTH DAILY, Disp: 90 tablet, Rfl: 2    loratadine (CLARITIN) 10 MG tablet, Take 1 tablet by mouth Daily., Disp: , Rfl:     metoprolol succinate XL (TOPROL-XL) 25 MG 24 hr tablet, TAKE 1 TABLET BY MOUTH DAILY, Disp: 90 tablet, Rfl: 3    rosuvastatin (CRESTOR) 10 MG tablet, Take 1 tablet by mouth Daily., Disp: 90 tablet, Rfl: 3    vitamin B-12 (CYANOCOBALAMIN) 1000 MCG tablet, Take 1 tablet by mouth Daily., Disp: , Rfl:  "    Objective:   Vitals and nursing note reviewed.   Constitutional:       Appearance: Healthy appearance. Not in distress.   Eyes:      Pupils: Pupils are equal, round, and reactive to light.   Neck:      Thyroid: No thyromegaly.      Vascular: No JVR. JVD normal.      Lymphadenopathy: No cervical adenopathy.   Pulmonary:      Effort: Pulmonary effort is normal.      Breath sounds: Normal breath sounds. No wheezing. No rhonchi. No rales.   Chest:      Chest wall: Not tender to palpatation.   Cardiovascular:      PMI at left midclavicular line. Normal rate. Regular rhythm. Normal S1. Normal S2.       Murmurs: There is no murmur.      No gallop.  No click. No rub.   Pulses:     Intact distal pulses.   Edema:     Peripheral edema absent.   Abdominal:      General: Bowel sounds are normal.      Palpations: Abdomen is soft.      Tenderness: There is no abdominal tenderness.   Musculoskeletal: Normal range of motion.         General: No tenderness. Skin:     General: Skin is warm and dry.   Neurological:      General: No focal deficit present.      Mental Status: Alert and oriented to person, place and time.       Blood pressure 110/74, pulse 54, height 188 cm (74\"), weight 98 kg (216 lb), SpO2 95%.   Lab Review:     Assessment:       1. Coronary artery disease involving native coronary artery of native heart without angina pectoris      2. Dyslipidemia  Tolerating statin based cholesterol-lowering therapy without side effects.    3. Primary hypertension  Acceptable blood pressure control.    4. PAC (premature atrial contraction)  Asymptomatic.    5. Multiple subsegmental pulmonary emboli without acute cor pulmonale  This appears to be an unprovoked thromboembolic event.  He has not had a hypercoagulability workup but is scheduled to see hematology in December.  He is currently being treated with Eliquis 5 mg orally twice daily.    6. ROSS (dyspnea on exertion)  I am concerned that the shortness of breath he is currently " "experiencing is a result of his bilateral pulmonary emboli and he may be developing chronic pulmonary thrombophlebitis syndrome.     Procedures    Plan:     Advance Care Planning   ACP discussion was held with the patient during this visit. Patient has an advance directive (not in EMR), copy requested.     The patient has been encouraged to follow-up with the hematologist.    I have advised the patient that I am not credentialed to treat pulmonary embolus but I suspect since this was an \"unprovoked\" pulmonary embolus he will require lifelong anticoagulation therapy.  This will certainly be the case if he is determined to have a hypercoagulable state.  I have advised the patient that I do not know the appropriate dosing protocol for using alternative NOAC therapy such as Xarelto which would be cheaper.  I have recommended the patient have follow-up with the local pulmonologist to address ongoing concerns related to his pulmonary embolus, residual symptoms and recommended duration of therapy as well as appropriate dosing of anticoagulation therapy.  He has been advised that he will need to continue aspirin therapy indefinitely.    No changes in cardiac medications made at today's visit.    No cardiovascular testing indicated at this time.      "

## 2024-10-17 ENCOUNTER — OFFICE VISIT (OUTPATIENT)
Dept: PULMONOLOGY | Facility: CLINIC | Age: 69
End: 2024-10-17
Payer: MEDICARE

## 2024-10-17 VITALS
WEIGHT: 220 LBS | BODY MASS INDEX: 28.23 KG/M2 | HEART RATE: 69 BPM | SYSTOLIC BLOOD PRESSURE: 122 MMHG | DIASTOLIC BLOOD PRESSURE: 64 MMHG | RESPIRATION RATE: 18 BRPM | HEIGHT: 74 IN | OXYGEN SATURATION: 97 %

## 2024-10-17 DIAGNOSIS — Z86.711 HISTORY OF PULMONARY EMBOLISM: Primary | ICD-10-CM

## 2024-10-17 DIAGNOSIS — J45.30 MILD PERSISTENT ASTHMA WITHOUT COMPLICATION: ICD-10-CM

## 2024-10-17 DIAGNOSIS — R91.1 LUNG NODULE, SOLITARY: ICD-10-CM

## 2024-10-17 DIAGNOSIS — J30.89 OTHER ALLERGIC RHINITIS: ICD-10-CM

## 2024-10-17 DIAGNOSIS — R06.02 SOB (SHORTNESS OF BREATH): Primary | ICD-10-CM

## 2024-10-17 RX ORDER — AZELASTINE 1 MG/ML
1 SPRAY, METERED NASAL 2 TIMES DAILY PRN
Qty: 1 EACH | Refills: 5 | Status: SHIPPED | OUTPATIENT
Start: 2024-10-17

## 2024-10-17 NOTE — PRE-PROCEDURE INSTRUCTIONS
PAT phone history completed with patient for upcoming procedure on 10/23/24.    PAT PASS reviewed with patient and he/she verbalized understanding of the following:     Do not eat or drink anything after midnight the night before procedure unless otherwise instructed by physician/surgeon's office, this includes no gum, candy, mints, tobacco products or e-cigarettes.  Do not shave the area to be operated on at least 48 hours prior to procedure.  Do not wear makeup, lotion, hair products, or nail polish.  Do not wear any jewelry and remove all piercings.  Do not wear any adhesive if you wear dentures.  Do not wear contacts; bring in glasses if needed.  Only take medications on the morning of procedure as instructed by PAT nurse per anesthesia guidelines or as instructed by physician's office.   If you are on any blood thinners reach out to the physician/surgeon's office for instructions on when/if they will need to be stopped prior to procedure.   Bring in picture ID and insurance card, advanced directive copies if applicable, CPAP/BIPAP/Inhalers if indicated morning of procedure, leave any other valuables at home.  Ensure you have arranged for someone to drive you home the day of your procedure and someone to care for you at home afterwards. It is recommended that you do not drive, drink alcohol, or make any major legal decisions for at least 24 hours after your procedure is complete.    Instructions given on hospital entrance and registration location.

## 2024-10-17 NOTE — PROGRESS NOTES
CONSULT NOTE    Requested by:   Gabe Mcqueen MD Runnels, Laken N, APRN      Chief Complaint   Patient presents with    Breathing Problem    Consult       Subjective:  Carmelo Arnold is a 69 y.o. male.   Patient comes in today for consultation because of recent diagnosed with pulmonary embolism.    The patient says that he drove, along with his family, to the beach in [place] but had significant trouble with his bladder at the time and had to stop multiple times.  He eventually had to go to the hospital after they arrived in [place] and Cordero catheter had to be placed at that time.    The patient came back and later on noticed worsening shortness of breath with minimal exertion and actually remembers trying to help his son move and could not perform any significant activities and was brought to the ER.    The patient was diagnosed with pulmonary embolism.    The patient does not recall having any other blood clots before.  He does not have any family history of blood clots either.    The patient denies any known history of cancer although his PSA has been elevated but he underwent a biopsy a few years ago which was benign, in September 2022.    He does admit to having allergies and does note occasional shortness of breath and wheezing.    He also notices days where his shortness of breath is somewhat more pronounced compared to others.  He does however, declined any significant limitation in exercise/exertion.    The following portions of the patient's history were reviewed and updated as appropriate: allergies, current medications, past family history, past medical history, past social history, and past surgical history.    Review of Systems   Constitutional:  Positive for fatigue.   HENT:  Negative for sinus pressure.    Respiratory:  Positive for shortness of breath.    Cardiovascular:  Negative for palpitations.   Psychiatric/Behavioral:  Positive for sleep disturbance.    All other  "systems reviewed and are negative.      Past Medical History:   Diagnosis Date    Acid reflux     Benign colon polyp 01/12/2021    Benign prostatic hyperplasia ?    Colon polyp     Colon polyp     Coronary artery disease involving native coronary artery of native heart with refractory angina pectoris 03/20/2023    Erectile dysfunction     Heart murmur     HL (hearing loss)     Primary hypertension 12/02/2022    Snores     Urinary tract infection ?       Social History     Tobacco Use    Smoking status: Never     Passive exposure: Past    Smokeless tobacco: Never   Substance Use Topics    Alcohol use: Yes     Comment: rarely drink         Objective:  Visit Vitals  /64   Pulse 69   Resp 18   Ht 188 cm (74\")   Wt 99.8 kg (220 lb)   SpO2 97%   BMI 28.25 kg/m²       BMI Readings from Last 8 Encounters:   10/17/24 28.25 kg/m²   10/15/24 27.73 kg/m²   09/24/24 27.73 kg/m²   08/14/24 27.09 kg/m²   08/11/24 27.56 kg/m²   08/08/24 27.57 kg/m²   06/13/24 27.58 kg/m²   05/10/24 27.09 kg/m²       Physical Exam  Vitals reviewed.   Constitutional:       Appearance: He is well-developed.   HENT:      Nose:      Comments: Nasal erythema noted.     Mouth/Throat:      Mouth: Mucous membranes are moist.      Comments: Oropharynx was somewhat cobblestoned.  Neck:      Thyroid: No thyromegaly.      Vascular: No JVD.   Cardiovascular:      Rate and Rhythm: Normal rate and regular rhythm.      Heart sounds: No murmur heard.  Pulmonary:      Effort: Pulmonary effort is normal. No respiratory distress.      Breath sounds: No wheezing or rales.   Musculoskeletal:      Cervical back: Neck supple.      Comments: Gait was normal   Skin:     General: Skin is warm and dry.   Neurological:      Mental Status: He is alert and oriented to person, place, and time.   Psychiatric:         Behavior: Behavior normal.           Assessment/Plan:  Diagnoses and all orders for this visit:    1. History of pulmonary embolism (Primary)    2. Mild " persistent asthma without complication  -     Nitric Oxide  -     Spirometry With Bronchodilator    3. Other allergic rhinitis    Other orders  -     azelastine (ASTELIN) 0.1 % nasal spray; Administer 1 spray into the nostril(s) as directed by provider 2 (Two) Times a Day As Needed for Rhinitis or Allergies. Use in each nostril as directed  Dispense: 1 each; Refill: 5        Return in about 5 months (around 3/17/2025) for Recheck.    DISCUSSION(if any):  I have also reviewed his discharge note that mentions pulmonary embolism and enlarged prostate with urinary retention.     Last CT scan results was reviewed in great detail with the patient.  Results for orders placed during the hospital encounter of 08/10/24    CT Angiogram Chest Pulmonary Embolism    Addendum 8/10/2024 10:29 PM  ADDENDUM REPORT    ADDENDUM:  This report was discussed with Dr. Cortez on Aug 10, 2024 22:29:00 EDT.    Authenticated and Electronically Signed by Yaron Mathis MD on  08/10/2024 10:29:42 PM    Narrative  FINAL REPORT    TECHNIQUE:  null    CLINICAL HISTORY:  RT Leg cramping, concern for blood clot, weakness, lethargic;  R/O PULMONARUY EMBOLISM    COMPARISON:  null    FINDINGS:  CT Angiography Chest W Contrast    3D Postprocessing    COMPARISON: CT/SR - CT CHEST W CONTRAST - 06/10/2024 03:53 PM EDT    FINDINGS:    Occlusive bilateral lower lobe subsegmental pulmonary emboli.    No thoracic aortic aneurysm or dissection.    No consolidation. Calcified right lower lobe granuloma. Stable 6 mm left lower lobe pulmonary nodule (series 4, image 71).    No pleural effusion.    No pneumothorax.    No cardiomegaly. No pericardial effusion. RV/LV Ratio: 1.55.    No pathologically enlarged lymph nodes.    No acute fracture.    Small hiatal hernia.    Impression  IMPRESSION:    Bilateral lower lobe subsegmental pulmonary emboli. Possible right heart strain.    Stable left lower lobe pulmonary nodule. Recommend follow-up chest CT in 6-12 months per  Fleischner Society guidelines.    See separate Abdomen/Pelvis CT report.    Authenticated and Electronically Signed by Yaron Mathis MD on  08/10/2024 10:27:30 PM    I also reviewed his last echocardiogram and shared the results with him.   Results for orders placed during the hospital encounter of 08/10/24    Adult Transthoracic Echo Complete w/ Color, Spectral and Contrast if necessary per protocol    Interpretation Summary    Left ventricular systolic function is normal. Calculated left ventricular EF = 65.3% Left ventricular ejection fraction appears to be 61 - 65%. Left ventricular diastolic function was normal.    Normal right ventricular size and function.    Mild mitral valve regurgitation is present.    Borderline dilation of the proximal aorta is present. Ascending aorta = 3.5 cm      Results for orders placed in visit on 12/02/22    Adult Transthoracic Echo Complete W/ Cont if Necessary Per Protocol    Interpretation Summary    Left ventricular systolic function is normal. Left ventricular ejection fraction appears to be 61 - 65%.    Left ventricular wall thickness is consistent with mild concentric hypertrophy.    Left ventricular diastolic function was normal.    Estimated right ventricular systolic pressure from tricuspid regurgitation is normal (<35 mmHg). Calculated right ventricular systolic pressure from tricuspid regurgitation is 14 mmHg.    ===========================  ===========================    Laboratory workup also showed   Lab Results   Component Value Date    HGB 12.7 (L) 08/10/2024    HGB 13.6 08/10/2024    HGB 14.6 12/13/2023   ,   Lab Results   Component Value Date    HCT 36.9 (L) 08/10/2024    HCT 39.8 08/10/2024    HCT 43.5 12/13/2023       Lab Results   Component Value Date    EOSABS 0.42 (H) 08/10/2024    EOSABS 0.43 (H) 08/10/2024    EOSABS 0.43 (H) 04/13/2023    & Laboratory workup also showed   Lab Results   Component Value Date    CO2 23.7 09/24/2024    CO2 23.7 08/10/2024     CO2 26.4 12/13/2023   ,   Lab Results   Component Value Date    HGBA1C 5.40 11/19/2021    HGBA1C 5.30 07/23/2021   ,   Lab Results   Component Value Date    TSH 3.040 12/13/2023    TSH 2.950 01/13/2023    TSH 2.370 05/16/2022     FeNO level was 11 today.    Spirometry was reviewed.  No significant obstruction noted    ===========================  ===========================    I told the patient that his pulmonary embolism was possibly provoked due to travel in a vehicle with only minimal interruptions.    He also had bladder distention and although unclear, pulmonary embolism may have originated in pelvic veins.    He is DVT Doppler study was negative.    His echocardiogram did not suggest any significant right valvular abnormalities.    Given all the above, and bilateral subsegmental pulmonary emboli, I feel that he can stop Eliquis after 3 months of therapy.    The patient already has an appointment with hematologist and although I do not feel that the patient will need lifelong anticoagulation, I told the patient that he may need to be further investigated for hypercoagulable tendencies by hematologist.    Some of his symptoms are consistent with mild intermittent asthma.    Patient was advised to use rescue inhaler for when necessary purposes    Patient was also advised to keep a log of the use of rescue inhaler.      He does appear to have allergic rhinitis and have asked him to use azelastine.    As far as the lung nodule is concerned, the left lower lobe nodule has been stable since January 2023 and given the fact that he is a low risk patient, no further workup is recommended at this time.    If clinically indicated, we may consider repeat noncontrast CT of the chest in 1 year or so from the last CT.      Dictated utilizing Dragon dictation.    This document was electronically signed by Javan Mckeon MD on 10/17/24 at 10:17 EDT

## 2024-10-21 ENCOUNTER — PATIENT ROUNDING (BHMG ONLY) (OUTPATIENT)
Dept: PULMONOLOGY | Facility: CLINIC | Age: 69
End: 2024-10-21
Payer: MEDICARE

## 2024-10-23 ENCOUNTER — ANESTHESIA (OUTPATIENT)
Dept: GASTROENTEROLOGY | Facility: HOSPITAL | Age: 69
End: 2024-10-23
Payer: MEDICARE

## 2024-10-23 ENCOUNTER — ANESTHESIA EVENT (OUTPATIENT)
Dept: GASTROENTEROLOGY | Facility: HOSPITAL | Age: 69
End: 2024-10-23
Payer: MEDICARE

## 2024-10-23 ENCOUNTER — HOSPITAL ENCOUNTER (OUTPATIENT)
Facility: HOSPITAL | Age: 69
Setting detail: HOSPITAL OUTPATIENT SURGERY
Discharge: HOME | End: 2024-10-23
Attending: INTERNAL MEDICINE | Admitting: INTERNAL MEDICINE
Payer: MEDICARE

## 2024-10-23 VITALS
TEMPERATURE: 97 F | SYSTOLIC BLOOD PRESSURE: 112 MMHG | DIASTOLIC BLOOD PRESSURE: 79 MMHG | RESPIRATION RATE: 16 BRPM | OXYGEN SATURATION: 98 % | HEART RATE: 70 BPM

## 2024-10-23 DIAGNOSIS — Z86.0100 HISTORY OF COLONIC POLYPS: ICD-10-CM

## 2024-10-23 DIAGNOSIS — Z86.0100 PERSONAL HISTORY OF COLONIC POLYPS: ICD-10-CM

## 2024-10-23 DIAGNOSIS — K21.9 GASTROESOPHAGEAL REFLUX DISEASE WITHOUT ESOPHAGITIS: ICD-10-CM

## 2024-10-23 LAB
C DIFF GDH + TOXINS A+B STL QL IA.RAPID: NEGATIVE
C DIFF GDH + TOXINS A+B STL QL IA.RAPID: NEGATIVE

## 2024-10-23 PROCEDURE — 43239 EGD BIOPSY SINGLE/MULTIPLE: CPT | Performed by: INTERNAL MEDICINE

## 2024-10-23 PROCEDURE — 25010000002 GLYCOPYRROLATE PF 0.4 MG/2ML SOLUTION: Performed by: NURSE ANESTHETIST, CERTIFIED REGISTERED

## 2024-10-23 PROCEDURE — 87324 CLOSTRIDIUM AG IA: CPT | Performed by: INTERNAL MEDICINE

## 2024-10-23 PROCEDURE — 25010000002 LIDOCAINE (CARDIAC): Performed by: NURSE ANESTHETIST, CERTIFIED REGISTERED

## 2024-10-23 PROCEDURE — 25010000002 PROPOFOL 10 MG/ML EMULSION: Performed by: NURSE ANESTHETIST, CERTIFIED REGISTERED

## 2024-10-23 PROCEDURE — 45380 COLONOSCOPY AND BIOPSY: CPT | Performed by: INTERNAL MEDICINE

## 2024-10-23 PROCEDURE — 45385 COLONOSCOPY W/LESION REMOVAL: CPT | Performed by: INTERNAL MEDICINE

## 2024-10-23 PROCEDURE — 88305 TISSUE EXAM BY PATHOLOGIST: CPT

## 2024-10-23 PROCEDURE — 87449 NOS EACH ORGANISM AG IA: CPT | Performed by: INTERNAL MEDICINE

## 2024-10-23 RX ORDER — SODIUM CHLORIDE 9 MG/ML
70 INJECTION, SOLUTION INTRAVENOUS CONTINUOUS PRN
Status: DISCONTINUED | OUTPATIENT
Start: 2024-10-23 | End: 2024-10-23 | Stop reason: HOSPADM

## 2024-10-23 RX ORDER — PANTOPRAZOLE SODIUM 40 MG/1
40 TABLET, DELAYED RELEASE ORAL DAILY
Qty: 30 TABLET | Refills: 5 | Status: SHIPPED | OUTPATIENT
Start: 2024-10-23

## 2024-10-23 RX ORDER — SIMETHICONE 40MG/0.6ML
SUSPENSION, DROPS(FINAL DOSAGE FORM)(ML) ORAL AS NEEDED
Status: DISCONTINUED | OUTPATIENT
Start: 2024-10-23 | End: 2024-10-23 | Stop reason: HOSPADM

## 2024-10-23 RX ORDER — ONDANSETRON 2 MG/ML
4 INJECTION INTRAMUSCULAR; INTRAVENOUS ONCE AS NEEDED
Status: CANCELLED | OUTPATIENT
Start: 2024-10-23

## 2024-10-23 RX ORDER — PROPOFOL 10 MG/ML
VIAL (ML) INTRAVENOUS AS NEEDED
Status: DISCONTINUED | OUTPATIENT
Start: 2024-10-23 | End: 2024-10-23 | Stop reason: SURG

## 2024-10-23 RX ADMIN — LIDOCAINE HYDROCHLORIDE 100 MG: 20 INJECTION, SOLUTION INTRAVENOUS at 10:51

## 2024-10-23 RX ADMIN — GLYCOPYRROLATE 0.2 MCG: 0.2 INJECTION, SOLUTION INTRAMUSCULAR; INTRAVENOUS at 10:51

## 2024-10-23 RX ADMIN — PROPOFOL 700 MG: 10 INJECTION, EMULSION INTRAVENOUS at 10:51

## 2024-10-23 NOTE — ANESTHESIA PREPROCEDURE EVALUATION
Anesthesia Evaluation     Patient summary reviewed and Nursing notes reviewed   no history of anesthetic complications:   NPO Solid Status: > 8 hours  NPO Liquid Status: > 8 hours           Airway   Mallampati: II  TM distance: >3 FB  Neck ROM: full  no difficulty expected and Possible difficult intubation  Dental - normal exam     Pulmonary - normal exam   (+) pulmonary embolism,  Cardiovascular - normal exam    (+) hypertension 2 medications or greater, valvular problems/murmurs murmur, CAD      Neuro/Psych  (+) psychiatric history Depression  GI/Hepatic/Renal/Endo    (+) thyroid problem hypothyroidism    Musculoskeletal (-) negative ROS    Abdominal    Substance History - negative use     OB/GYN negative ob/gyn ROS         Other - negative ROS       ROS/Med Hx Other:   Left ventricular systolic function is normal. Calculated left ventricular EF = 65.3% Left ventricular ejection fraction appears to be 61 - 65%. Left ventricular diastolic function was normal.  ·  Normal right ventricular size and function.  ·  Mild mitral valve regurgitation is present.  ·  Borderline dilation of the proximal aorta is present. Ascending aorta = 3.5 cm                     Anesthesia Plan    ASA 3     MAC     (Pt told that intravenous sedation will be used as the primary anesthetic along with local anesthesia if necessary. Every effort will be made to make sure the patient is comfortable.     The patient was told they may or may not have recall for the procedure. It was further explained that if the MAC was not adequate that a general anesthetic with either an LMA or endotracheal tube would be required.     Will proceed with the plan of care.)  intravenous induction     Anesthetic plan, risks, benefits, and alternatives have been provided, discussed and informed consent has been obtained with: patient.    CODE STATUS:

## 2024-10-23 NOTE — ANESTHESIA POSTPROCEDURE EVALUATION
Patient: Carmelo Arnold    Procedure Summary       Date: 10/23/24 Room / Location: [facility] / [facility]    Anesthesia Start: 1044 Anesthesia Stop: 1150    Procedures:       Esophagogastroduodenoscopy with biopsy and polypectomy (Esophagus)      COLONOSCOPY WITH BIOPSY, POLYPECTOMY, STOOL SAMPLING (Anus) Diagnosis:       Personal history of colonic polyps      Gastroesophageal reflux disease without esophagitis      (Personal history of colonic polyps [Z86.010])      (Gastroesophageal reflux disease without esophagitis [K21.9])    Surgeons: Mike Esteban MD Provider: Ben Ceron CRNA    Anesthesia Type: MAC ASA Status: 3            Anesthesia Type: MAC    Vitals  Vitals Value Taken Time   BP 98/70 10/23/24 1153   Temp 97 °F (36.1 °C) 10/23/24 1153   Pulse 91 10/23/24 1153   Resp 16 10/23/24 1153   SpO2 93 % 10/23/24 1153           Post Anesthesia Care and Evaluation    Patient location during evaluation: PHASE II  Patient participation: complete - patient participated  Level of consciousness: awake  Pain score: 1  Pain management: adequate    Airway patency: patent  Anesthetic complications: No anesthetic complications  PONV Status: controlled  Cardiovascular status: acceptable and stable  Respiratory status: acceptable  Hydration status: acceptable    Comments: See Nursing record for post procedural Vital Signs as per protocol.

## 2024-10-23 NOTE — DISCHARGE INSTRUCTIONS
- Discharge patient to home (ambulatory).   - Resume previous diet.   - Follow an antireflux regimen.   - PPI daily  - Avoid NSAIDS  - Restart ASA tomorrow  - Hold eliquis for 72 hrs  - Repeat EGD in 3 months for surveillance  - Repeat colonoscopy in 3 years for surveillance  - Follow up 8 weeks     No pushing, pulling, tugging,  heavy lifting, or strenuous activity.  No major decision making, driving, or drinking alcoholic beverages for 24 hours. ( due to the medications you have  received)  Always use good hand hygiene/washing techniques.  NO driving while taking pain medications.    * if you have an incision:  Check your incision area every day for signs of infection.   Check for:  * more redness, swelling, or pain  *more fluid or blood  *warmth  *pus or bad smellTo assist you in voiding:  Drink plenty of fluids  Listen to running water while attempting to void.    If you are unable to urinate and you have an uncomfortable urge to void or it has been   6 hours since you were discharged, return to the Emergency Room   Call my nurse with any questions or concerns:  [phone]  *If you have concerns after hours, please call [phone], option 2 to speak with on call Cardiologist.    1. Medication changes from today:    Decrease metoprolol 1/2 tablet daily (25 mg daily)  Call with any concerns or questions

## 2024-10-24 LAB — REF LAB TEST METHOD: NORMAL

## 2024-11-15 ENCOUNTER — LAB (OUTPATIENT)
Dept: LAB | Facility: HOSPITAL | Age: 69
End: 2024-11-15
Payer: MEDICARE

## 2024-11-15 ENCOUNTER — TRANSCRIBE ORDERS (OUTPATIENT)
Dept: LAB | Facility: HOSPITAL | Age: 69
End: 2024-11-15
Payer: MEDICARE

## 2024-11-15 DIAGNOSIS — R97.20 ELEVATED PSA: ICD-10-CM

## 2024-11-15 DIAGNOSIS — R97.20 ELEVATED PSA: Primary | ICD-10-CM

## 2024-11-15 LAB
ANION GAP SERPL CALCULATED.3IONS-SCNC: 10 MMOL/L (ref 5–15)
BUN SERPL-MCNC: 19 MG/DL (ref 8–23)
BUN/CREAT SERPL: 16.2 (ref 7–25)
CALCIUM SPEC-SCNC: 9.4 MG/DL (ref 8.6–10.5)
CHLORIDE SERPL-SCNC: 101 MMOL/L (ref 98–107)
CO2 SERPL-SCNC: 27 MMOL/L (ref 22–29)
CREAT SERPL-MCNC: 1.17 MG/DL (ref 0.76–1.27)
EGFRCR SERPLBLD CKD-EPI 2021: 66.6 ML/MIN/1.73
GLUCOSE SERPL-MCNC: 89 MG/DL (ref 65–99)
POTASSIUM SERPL-SCNC: 4.4 MMOL/L (ref 3.5–5.2)
SODIUM SERPL-SCNC: 138 MMOL/L (ref 136–145)

## 2024-11-15 PROCEDURE — 80048 BASIC METABOLIC PNL TOTAL CA: CPT | Performed by: NURSE PRACTITIONER

## 2024-11-15 PROCEDURE — 84153 ASSAY OF PSA TOTAL: CPT | Performed by: NURSE PRACTITIONER

## 2024-11-22 ENCOUNTER — TRANSCRIBE ORDERS (OUTPATIENT)
Dept: ADMINISTRATIVE | Facility: HOSPITAL | Age: 69
End: 2024-11-22
Payer: MEDICARE

## 2024-11-22 DIAGNOSIS — R39.15 URGENCY OF URINATION: Primary | ICD-10-CM

## 2024-11-22 DIAGNOSIS — R97.20 ELEVATED PROSTATE SPECIFIC ANTIGEN (PSA): ICD-10-CM

## 2024-11-22 DIAGNOSIS — R39.9 LOWER URINARY TRACT SYMPTOMS: ICD-10-CM

## 2024-12-09 ENCOUNTER — HOSPITAL ENCOUNTER (EMERGENCY)
Facility: HOSPITAL | Age: 69
Discharge: HOME | End: 2024-12-09
Attending: STUDENT IN AN ORGANIZED HEALTH CARE EDUCATION/TRAINING PROGRAM | Admitting: STUDENT IN AN ORGANIZED HEALTH CARE EDUCATION/TRAINING PROGRAM
Payer: MEDICARE

## 2024-12-09 ENCOUNTER — PATIENT MESSAGE (OUTPATIENT)
Dept: INTERNAL MEDICINE | Facility: CLINIC | Age: 69
End: 2024-12-09
Payer: MEDICARE

## 2024-12-09 ENCOUNTER — APPOINTMENT (OUTPATIENT)
Dept: ULTRASOUND IMAGING | Facility: HOSPITAL | Age: 69
End: 2024-12-09
Payer: MEDICARE

## 2024-12-09 VITALS
RESPIRATION RATE: 16 BRPM | WEIGHT: 215 LBS | DIASTOLIC BLOOD PRESSURE: 66 MMHG | HEIGHT: 74 IN | TEMPERATURE: 97.5 F | BODY MASS INDEX: 27.59 KG/M2 | HEART RATE: 55 BPM | OXYGEN SATURATION: 99 % | SYSTOLIC BLOOD PRESSURE: 126 MMHG

## 2024-12-09 DIAGNOSIS — R25.2 LEG CRAMPS: Primary | ICD-10-CM

## 2024-12-09 PROCEDURE — 93970 EXTREMITY STUDY: CPT

## 2024-12-09 PROCEDURE — 99284 EMERGENCY DEPT VISIT MOD MDM: CPT | Performed by: STUDENT IN AN ORGANIZED HEALTH CARE EDUCATION/TRAINING PROGRAM

## 2024-12-09 RX ORDER — METHOCARBAMOL 750 MG/1
750 TABLET, FILM COATED ORAL 3 TIMES DAILY PRN
Qty: 15 TABLET | Refills: 0 | Status: SHIPPED | OUTPATIENT
Start: 2024-12-09 | End: 2024-12-14

## 2024-12-09 NOTE — TELEPHONE ENCOUNTER
Patient called back to the office and really does not want to go to the ED. I again advised that with his history and feeling of having blood clots it is really in his best interest to have this evaluated in the ED today. Patient states understanding and is agreeable.

## 2024-12-09 NOTE — TELEPHONE ENCOUNTER
Spoke with patient, is not having any redness or swelling but is complaining of pain. States that he has had a blood clot in the past. I advised that PCP is out of the office and he would need to proceed to ED for evaluation and treatment. Patient is agreeable and will call to schedule hospital follow up once discharged.

## 2024-12-09 NOTE — ED PROVIDER NOTES
Subjective:  History of Present Illness:    Patient is a 69-year-old male with history of PE.  Presents to the ER today with bilateral calf pain x 1 week.  He denies edema or erythema to either leg.  Denies injury.  Denies current use of blood thinner.  Denies shortness of breath or chest pain.  Denies abdominal pain.  Denies changes in bowel or bladder habit.  Denies OTC medication or home remedy.  Denies leaving exacerbating factors.    Nurses Notes reviewed and agree, including vitals, allergies, social history and prior medical history.     REVIEW OF SYSTEMS: All systems reviewed and not pertinent unless noted.  Review of Systems   Musculoskeletal:         Pain in bilateral calf   All other systems reviewed and are negative.      Past Medical History:   Diagnosis Date    Acid reflux     Asthma     Benign colon polyp 01/12/2021    Benign prostatic hyperplasia ?    Coronary artery disease involving native coronary artery of native heart with refractory angina pectoris 03/20/2023    followed by Dr. Mcqueen    Dyspnea on exertion     followed by Dr. Mckeon, Dr. Mcqueen    Erectile dysfunction     Heart murmur     HL (hearing loss)     has hearing aids but doesn't always wear them    Primary hypertension 12/02/2022    Pulmonary embolism 08/2024    on eliquis    Snores        Allergies:    Patient has no known allergies.      Past Surgical History:   Procedure Laterality Date    CARDIAC CATHETERIZATION N/A 3/28/2023    Procedure: Coronary angiography;  Surgeon: Gabe Mcqueen MD;  Location:  [facility];  Service: Cardiology;  Laterality: N/A;    CARDIAC CATHETERIZATION N/A 4/13/2023    Procedure:  PCI of the LAD;  Surgeon: Kevin Cortez IV, MD;  Location:  [facility];  Service: Cardiovascular;  Laterality: N/A;    CARDIAC CATHETERIZATION N/A 4/13/2023    Procedure: Optical Coherent Tomography;  Surgeon: Kevin Cortez IV, MD;  Location:  [facility]  "INVASIVE LOCATION;  Service: Cardiovascular;  Laterality: N/A;    CARDIAC CATHETERIZATION N/A 4/13/2023    Procedure: Left Heart Cath;  Surgeon: Kevin Cortez IV, MD;  Location: [facility] CATH INVASIVE LOCATION;  Service: Cardiovascular;  Laterality: N/A;    COLONOSCOPY      COLONOSCOPY N/A 10/23/2024    Procedure: COLONOSCOPY WITH BIOPSY, POLYPECTOMY, STOOL SAMPLING;  Surgeon: Mike Esteban MD;  Location: [facility] ENDOSCOPY;  Service: Gastroenterology;  Laterality: N/A;    ENDOSCOPY N/A 10/23/2024    Procedure: Esophagogastroduodenoscopy with biopsy and polypectomy;  Surgeon: Mike Esteban MD;  Location: [facility] ENDOSCOPY;  Service: Gastroenterology;  Laterality: N/A;         Social History     Socioeconomic History    Marital status:    Tobacco Use    Smoking status: Never     Passive exposure: Past    Smokeless tobacco: Never   Vaping Use    Vaping status: Never Used   Substance and Sexual Activity    Alcohol use: Yes     Comment: rarely drink    Drug use: Never    Sexual activity: Defer         Family History   Problem Relation Age of Onset    Arthritis Mother     Breast cancer Sister     Migraines Daughter     Colon cancer Neg Hx        Objective  Physical Exam:  /66   Pulse 55   Temp 97.5 °F (36.4 °C) (Oral)   Resp 16   Ht 188 cm (74\")   Wt 97.5 kg (215 lb)   SpO2 99%   BMI 27.60 kg/m²      Physical Exam  Vitals and nursing note reviewed.   Constitutional:       Appearance: Normal appearance. He is normal weight.   HENT:      Head: Normocephalic and atraumatic.      Nose: Nose normal.      Mouth/Throat:      Mouth: Mucous membranes are moist.      Pharynx: Oropharynx is clear.   Eyes:      Extraocular Movements: Extraocular movements intact.      Conjunctiva/sclera: Conjunctivae normal.      Pupils: Pupils are equal, round, and reactive to light.   Cardiovascular:      Rate and Rhythm: Normal rate and regular rhythm.      Pulses: Normal pulses.      Heart sounds: " Normal heart sounds.   Pulmonary:      Effort: Pulmonary effort is normal.      Breath sounds: Normal breath sounds.   Abdominal:      General: Abdomen is flat. Bowel sounds are normal.      Palpations: Abdomen is soft.   Musculoskeletal:         General: Normal range of motion.      Cervical back: Normal range of motion and neck supple.      Comments: Pain with palpation bilateral calf   Skin:     General: Skin is warm and dry.      Capillary Refill: Capillary refill takes less than 2 seconds.   Neurological:      General: No focal deficit present.      Mental Status: He is alert and oriented to person, place, and time. Mental status is at baseline.   Psychiatric:         Mood and Affect: Mood normal.         Behavior: Behavior normal.         Thought Content: Thought content normal.         Judgment: Judgment normal.         Procedures    ED Course:         Lab Results (last 24 hours)       ** No results found for the last 24 hours. **             US Venous Doppler Lower Extremity Bilateral (duplex)    Result Date: 12/9/2024  BILATERAL LOWER EXTREMITY VENOUS DUPLEX DOPPLER EXAMINATION  HISTORY: Rule out DVT, lower extremity swelling.  COMPARISON:   None  PROCEDURE: Multiple transverse and longitudinal scans were performed of both femoropopliteal deep venous system, with augmentation and compression maneuvers.  FINDINGS: Proper phasic flow was noted in the visualized deep venous system. No intraluminal increased echogenicity is noted to suggest thrombus. There is proper compression and augmentation of the venous structures. No abnormal venous collaterals are seen.      Impression: No evidence of left or right lower extremity deep venous thrombosis.      This report was signed and finalized on 12/9/2024 12:55 PM by Rayne Parker MD.          [facility]      Initial impression of presenting illness: Patient is a 69-year-old male with history of PE.  Presents to the ER today with bilateral calf pain x 1 week.  He denies edema  or erythema to either leg.  Denies injury.  Denies current use of blood thinner.  Denies shortness of breath or chest pain.  Denies abdominal pain.  Denies changes in bowel or bladder habit.  Denies OTC medication or home remedy.  Denies leaving exacerbating factors.    DDX: includes but is not limited to: DVT, strain, sprain or other, cramps or other    Patient arrives stable with vitals interpreted by myself.     Pertinent features from physical exam: Lung sounds are clear bilaterally throughout.  Abdo soft nontender.  Bowel sounds are normal.  Heart sounds normal.  There is tenderness with palpation of bilateral calf.  There is no erythema or edema..    Initial diagnostic plan: Venous Doppler bilateral lower extremity    Results from initial plan were reviewed and interpreted by me revealing venous Doppler bilateral lower extremities without DVT.    Diagnostic information from other sources: Chart review    Interventions / Re-evaluation: Vital signs stable throughout encounter    Results/clinical rationale were discussed with patient    Consultations/Discussion of results with other physicians: N/A    Disposition plan: Patient is hemodynamically stable nontoxic-appearing appropriate discharge.  Please follow-up with PCP in 1 week.  Follow-up with ER for new or worse symptoms.  -----        Final diagnoses:   Leg cramps          Andrew Hughes, APRN  12/09/24 [id number]

## 2024-12-17 ENCOUNTER — OFFICE VISIT (OUTPATIENT)
Dept: INTERNAL MEDICINE | Facility: CLINIC | Age: 69
End: 2024-12-17
Payer: MEDICARE

## 2024-12-17 VITALS
TEMPERATURE: 98 F | DIASTOLIC BLOOD PRESSURE: 87 MMHG | WEIGHT: 222 LBS | RESPIRATION RATE: 16 BRPM | SYSTOLIC BLOOD PRESSURE: 126 MMHG | OXYGEN SATURATION: 99 % | HEART RATE: 61 BPM | BODY MASS INDEX: 28.49 KG/M2 | HEIGHT: 74 IN

## 2024-12-17 DIAGNOSIS — N40.0 BENIGN PROSTATIC HYPERPLASIA WITHOUT LOWER URINARY TRACT SYMPTOMS: ICD-10-CM

## 2024-12-17 DIAGNOSIS — I25.10 CORONARY ARTERY DISEASE INVOLVING NATIVE CORONARY ARTERY OF NATIVE HEART WITHOUT ANGINA PECTORIS: ICD-10-CM

## 2024-12-17 DIAGNOSIS — E03.9 ACQUIRED HYPOTHYROIDISM: ICD-10-CM

## 2024-12-17 DIAGNOSIS — E78.2 MIXED HYPERLIPIDEMIA: ICD-10-CM

## 2024-12-17 DIAGNOSIS — Z00.00 MEDICARE ANNUAL WELLNESS VISIT, SUBSEQUENT: Primary | ICD-10-CM

## 2024-12-17 DIAGNOSIS — H60.393 ACUTE INFECTIVE OTITIS EXTERNA, BILATERAL: ICD-10-CM

## 2024-12-17 PROBLEM — E66.01 MORBID OBESITY WITH BODY MASS INDEX (BMI) OF 40.0 OR HIGHER: Status: ACTIVE | Noted: 2024-08-19

## 2024-12-17 PROCEDURE — 99213 OFFICE O/P EST LOW 20 MIN: CPT | Performed by: NURSE PRACTITIONER

## 2024-12-17 PROCEDURE — 1159F MED LIST DOCD IN RCRD: CPT | Performed by: NURSE PRACTITIONER

## 2024-12-17 PROCEDURE — 3074F SYST BP LT 130 MM HG: CPT | Performed by: NURSE PRACTITIONER

## 2024-12-17 PROCEDURE — 3079F DIAST BP 80-89 MM HG: CPT | Performed by: NURSE PRACTITIONER

## 2024-12-17 PROCEDURE — 1170F FXNL STATUS ASSESSED: CPT | Performed by: NURSE PRACTITIONER

## 2024-12-17 PROCEDURE — 1126F AMNT PAIN NOTED NONE PRSNT: CPT | Performed by: NURSE PRACTITIONER

## 2024-12-17 PROCEDURE — 1160F RVW MEDS BY RX/DR IN RCRD: CPT | Performed by: NURSE PRACTITIONER

## 2024-12-17 PROCEDURE — G0439 PPPS, SUBSEQ VISIT: HCPCS | Performed by: NURSE PRACTITIONER

## 2024-12-17 RX ORDER — CIPROFLOXACIN AND DEXAMETHASONE 3; 1 MG/ML; MG/ML
4 SUSPENSION/ DROPS AURICULAR (OTIC) 2 TIMES DAILY
Qty: 7.5 ML | Refills: 0 | Status: SHIPPED | OUTPATIENT
Start: 2024-12-17 | End: 2024-12-24

## 2024-12-17 RX ORDER — ALBUTEROL SULFATE 90 UG/1
2 INHALANT RESPIRATORY (INHALATION)
COMMUNITY
Start: 2024-10-21

## 2024-12-17 RX ORDER — LEVOTHYROXINE SODIUM 88 UG/1
1 TABLET ORAL DAILY
COMMUNITY
Start: 2024-12-04

## 2024-12-17 NOTE — ASSESSMENT & PLAN NOTE
Continue following with cardiology. Continue aspirin, lisinopril-HCTZ, metoprolol, rosuvastatin as prescribed. Counseled on cardiac diet, moderate intensity exercise 4-5 times weekly, medication compliance.   Orders:    CBC No Differential    Comprehensive metabolic panel    Lipid panel    TSH Rfx On Abnormal To Free T4

## 2024-12-17 NOTE — PROGRESS NOTES
Subjective   The ABCs of the Annual Wellness Visit  Medicare Wellness Visit      Carmelo Arnold is a 69 y.o. patient who presents for a Medicare Wellness Visit.    The following portions of the patient's history were reviewed and   updated as appropriate: allergies, current medications, past family history, past medical history, past social history, past surgical history, and problem list.    Patient is here today for annual Medicare Wellness Exam and ear problem.  CAD/HTN: compliant with aspirin, lisinopril-HCTZ, metoprolol.   Is following with cardiology.   Is not monitoring his blood pressure at home.   Denies headaches, blurred vision, lower extremity edema, chest pain, shortness of air.  Hyperlipidemia: compliant with rosuvastatin.   Denies myalgia and dark urine.  GERD: compliant with pantoprazole.   Following with gastroenterology.   Denies breakthrough symptoms, abdominal pain, nausea, vomiting, change in bowel habits.   Hypothyroidism: compliant with levothyroxine.   Denies difficulty swallowing, temperature intolerance, constipation, palpitations, anxiety.   Patient complains of bilateral ears having foul smelling drainage for a little over a week.   Denies otalgia, tinnitus, hearing loss.   Had colonoscopy in October, instructed to repeat in 3 years.   Patient has not other acute complaints today    Compared to one year ago, the patient's physical   health is the same.  Compared to one year ago, the patient's mental   health is the same.    Recent Hospitalizations:  This patient has had a [facility] admission record on file within the last 365 days.  Current Medical Providers:  Patient Care Team:  Hortensia Huynh APRN as PCP - General (Family Medicine)  Negrita Herrera MD as Consulting Physician (General Surgery)  Negrita Herrera MD as Consulting Physician (General Surgery)  Gabe Mcqueen MD as Consulting Physician (Cardiology)  Mike Esteban MD as Consulting Physician  (Gastroenterology)    Outpatient Medications Prior to Visit   Medication Sig Dispense Refill    albuterol sulfate  (90 Base) MCG/ACT inhaler 2 puffs.      Aspirin 81 MG capsule Take 81 mg by mouth Daily.      azelastine (ASTELIN) 0.1 % nasal spray Administer 1 spray into the nostril(s) as directed by provider 2 (Two) Times a Day As Needed for Rhinitis or Allergies. Use in each nostril as directed 1 each 5    finasteride (PROSCAR) 5 MG tablet Take 1 tablet by mouth Daily.      levothyroxine (SYNTHROID, LEVOTHROID) 88 MCG tablet Take 1 tablet by mouth Daily.      lisinopril-hydrochlorothiazide (PRINZIDE,ZESTORETIC) 10-12.5 MG per tablet TAKE 1 TABLET BY MOUTH DAILY 90 tablet 2    loratadine (CLARITIN) 10 MG tablet Take 1 tablet by mouth Daily.      metoprolol succinate XL (TOPROL-XL) 25 MG 24 hr tablet TAKE 1 TABLET BY MOUTH DAILY 90 tablet 3    pantoprazole (PROTONIX) 40 MG EC tablet Take 1 tablet by mouth Daily. Indications: Esophagus Inflammation with Erosion 30 tablet 5    vitamin B-12 (CYANOCOBALAMIN) 1000 MCG tablet Take 1 tablet by mouth Daily.      rosuvastatin (CRESTOR) 10 MG tablet Take 1 tablet by mouth Daily. 90 tablet 3    apixaban (ELIQUIS) 5 MG tablet tablet Take 1 tablet by mouth 2 (Two) Times a Day. 28 tablet 0     No facility-administered medications prior to visit.     No opioid medication identified on active medication list. I have reviewed chart for other potential  high risk medication/s and harmful drug interactions in the elderly.      Aspirin is on active medication list. Aspirin use is indicated based on review of current medical condition/s. Pros and cons of this therapy have been discussed today. Benefits of this medication outweigh potential harm.  Patient has been encouraged to continue taking this medication.  .      Patient Active Problem List   Diagnosis    Vitamin D deficiency    Benign prostatic hyperplasia with urinary obstruction    Hypothyroidism    Dyslipidemia    B12  "deficiency    PAC (premature atrial contraction)    Ventral hernia    Benign colon polyp    Chronic pain of right knee    ROSS (dyspnea on exertion)    Depression    Hypertension    Impotence of organic origin    Chronic prostatitis    Incomplete emptying of bladder    Induration penis plastica    Coronary artery disease involving native coronary artery of native heart without angina pectoris    Pulmonary embolus    Acute retention of urine    Pure hypercholesterolemia    Personal history of colonic polyps    Gastroesophageal reflux disease without esophagitis    Morbid obesity with body mass index (BMI) of 40.0 or higher     Advance Care Planning   Advance Directive is not on file.  ACP discussion was held with the patient during this visit. Patient has an advance directive (not in EMR), copy requested.        Objective   Vitals:    12/17/24 1009   BP: 126/87   Pulse: 61   Resp: 16   Temp: 98 °F (36.7 °C)   SpO2: 99%   Weight: 101 kg (222 lb)   Height: 188 cm (74\")   PainSc: 0-No pain       Estimated body mass index is 28.5 kg/m² as calculated from the following:    Height as of this encounter: 188 cm (74\").    Weight as of this encounter: 101 kg (222 lb).    BMI is >= 25 and <30. (Overweight) The following options were offered after discussion;: weight loss educational material (shared in after visit summary), exercise counseling/recommendations, and nutrition counseling/recommendations         Does the patient have evidence of cognitive impairment? No    Review of Systems   HENT:  Positive for ear discharge and ear pain.    All other systems reviewed and are negative.    Physical Exam  Vitals and nursing note reviewed.   Constitutional:       General: He is not in acute distress.     Appearance: Normal appearance.   HENT:      Right Ear: Drainage (yellow), swelling and tenderness present.      Left Ear: Drainage (yellow), swelling and tenderness present.      Nose: Nose normal.      Mouth/Throat:      Mouth: " Mucous membranes are moist.      Pharynx: No posterior oropharyngeal erythema.   Eyes:      Extraocular Movements: Extraocular movements intact.      Right eye: No nystagmus.      Left eye: No nystagmus.      Conjunctiva/sclera: Conjunctivae normal.      Pupils: Pupils are equal, round, and reactive to light.   Neck:      Thyroid: No thyroid mass or thyromegaly.      Vascular: No carotid bruit.   Cardiovascular:      Rate and Rhythm: Normal rate and regular rhythm.      Pulses: Normal pulses.      Heart sounds: Normal heart sounds. No murmur heard.  Pulmonary:      Effort: Pulmonary effort is normal.      Breath sounds: Normal breath sounds. No wheezing.   Abdominal:      General: Bowel sounds are normal. There is no distension.      Palpations: Abdomen is soft.      Tenderness: There is no abdominal tenderness.      Hernia: No hernia is present.   Musculoskeletal:         General: Deformity present. No tenderness.      Cervical back: Normal range of motion and neck supple. No muscular tenderness.      Right lower leg: No edema.      Left lower leg: No edema.   Lymphadenopathy:      Head:      Right side of head: No submandibular, tonsillar, preauricular or posterior auricular adenopathy.      Left side of head: No submandibular, tonsillar, preauricular or posterior auricular adenopathy.      Cervical: No cervical adenopathy.   Skin:     General: Skin is warm and dry.      Capillary Refill: Capillary refill takes less than 2 seconds.      Findings: No lesion or rash.   Neurological:      General: No focal deficit present.      Mental Status: He is alert and oriented to person, place, and time.      Coordination: Coordination is intact.      Gait: Gait is intact.      Deep Tendon Reflexes: Reflexes normal.   Psychiatric:         Mood and Affect: Mood normal.         Behavior: Behavior normal.                                                                                             Health  Risk Assessment    Smoking  Status:  Social History     Tobacco Use   Smoking Status Never    Passive exposure: Past   Smokeless Tobacco Never     Alcohol Consumption:  Social History     Substance and Sexual Activity   Alcohol Use Yes    Comment: rarely drink       Fall Risk Screen    SHAHANAADI Fall Risk Assessment was completed, and patient is at LOW risk for falls.Assessment completed on:2024    Depression Screening     Little interest or pleasure in doing things? Several days   Feeling down, depressed, or hopeless? Not at all   PHQ-2 Total Score 1      Health Habits and Functional and Cognitive Screenin/17/2024    10:07 AM   Functional & Cognitive Status   Do you have difficulty preparing food and eating? No   Do you have difficulty bathing yourself, getting dressed or grooming yourself? No   Do you have difficulty using the toilet? No   Do you have difficulty moving around from place to place? No   Do you have trouble with steps or getting out of a bed or a chair? No   Current Diet Well Balanced Diet   Dental Exam Up to date   Eye Exam Up to date   Exercise (times per week) 0 times per week   Current Exercises Include No Regular Exercise   Do you need help using the phone?  No   Are you deaf or do you have serious difficulty hearing?  No   Do you need help to go to places out of walking distance? No   Do you need help shopping? No   Do you need help preparing meals?  No   Do you need help with housework?  No   Do you need help with laundry? No   Do you need help taking your medications? No   Do you need help managing money? No   Do you ever drive or ride in a car without wearing a seat belt? No   Have you felt unusual stress, anger or loneliness in the last month? No   Who do you live with? Spouse   If you need help, do you have trouble finding someone available to you? No   Have you been bothered in the last four weeks by sexual problems? No   Do you have difficulty concentrating, remembering or making decisions? No            Age-appropriate Screening Schedule:  Refer to the list below for future screening recommendations based on patient's age, sex and/or medical conditions. Orders for these recommended tests are listed in the plan section. The patient has been provided with a written plan.    Health Maintenance List  Health Maintenance   Topic Date Due    LIPID PANEL  12/13/2024    COVID-19 Vaccine (3 - 2024-25 season) 03/08/2025 (Originally 9/1/2024)    INFLUENZA VACCINE  03/31/2025 (Originally 7/1/2024)    ANNUAL WELLNESS VISIT  12/17/2025    BMI FOLLOWUP  12/17/2025    TDAP/TD VACCINES (2 - Td or Tdap) 05/19/2026    COLORECTAL CANCER SCREENING  10/23/2027    HEPATITIS C SCREENING  Completed    Pneumococcal Vaccine 65+  Completed    ZOSTER VACCINE  Completed                                                                                                                                                CMS Preventative Services Quick Reference  Risk Factors Identified During Encounter  Fall Risk-High or Moderate: Discussed Fall Prevention in the home  Polypharmacy: Medication List reviewed and Medications are appropriate for patient    The above risks/problems have been discussed with the patient.  Pertinent information has been shared with the patient in the After Visit Summary.  An After Visit Summary and PPPS were made available to the patient.    Follow Up:   Next Medicare Wellness visit to be scheduled in 1 year.    Assessment & Plan  Medicare annual wellness visit, subsequent  Preventative maintenance discussed during visit.   Orders:    CBC No Differential    Comprehensive metabolic panel    Lipid panel    TSH Rfx On Abnormal To Free T4    Coronary artery disease involving native coronary artery of native heart without angina pectoris  Continue following with cardiology. Continue aspirin, lisinopril-HCTZ, metoprolol, rosuvastatin as prescribed. Counseled on cardiac diet, moderate intensity exercise 4-5 times weekly,  medication compliance.   Orders:    CBC No Differential    Comprehensive metabolic panel    Lipid panel    TSH Rfx On Abnormal To Free T4    Acquired hypothyroidism  TSH ordered to update. Will titrate levothyroxine as clinically indicated by labs. Counseled on medication compliance.   Orders:    CBC No Differential    Comprehensive metabolic panel    Lipid panel    TSH Rfx On Abnormal To Free T4    Mixed hyperlipidemia   Lipid panel ordered to update. Continue rosuvastatin as prescribed. Counseled on Mediterranean diet and moderate intensity exercise 4-5 times weekly.   Orders:    CBC No Differential    Comprehensive metabolic panel    Lipid panel    TSH Rfx On Abnormal To Free T4    Acute infective otitis externa, bilateral  Ciprodex BID bilaterally for 7 days. Instructed to keep ears dry, finish full course of antibiotics. Follow-up if symptoms do not improve.  Orders:    ciprofloxacin-dexAMETHasone (Ciprodex) 0.3-0.1 % otic suspension; Administer 4 drops into both ears 2 (Two) Times a Day for 7 days.    Benign prostatic hyperplasia without lower urinary tract symptoms  Managed by urology, keep scheduled follow-up and continue finasteride as prescribed.           This note accurately reflects work and decisions made by me.    Follow Up:   Return in about 1 year (around 12/17/2025) for Medicare Wellness.

## 2024-12-17 NOTE — ASSESSMENT & PLAN NOTE
TSH ordered to update. Will titrate levothyroxine as clinically indicated by labs. Counseled on medication compliance.   Orders:    CBC No Differential    Comprehensive metabolic panel    Lipid panel    TSH Rfx On Abnormal To Free T4

## 2024-12-19 ENCOUNTER — CONSULT (OUTPATIENT)
Dept: ONCOLOGY | Facility: CLINIC | Age: 69
End: 2024-12-19
Payer: MEDICARE

## 2024-12-19 ENCOUNTER — LAB (OUTPATIENT)
Dept: LAB | Facility: HOSPITAL | Age: 69
End: 2024-12-19
Payer: MEDICARE

## 2024-12-19 ENCOUNTER — HOSPITAL ENCOUNTER (OUTPATIENT)
Dept: CT IMAGING | Facility: HOSPITAL | Age: 69
Discharge: HOME | End: 2024-12-19
Payer: MEDICARE

## 2024-12-19 VITALS
BODY MASS INDEX: 28.23 KG/M2 | HEIGHT: 74 IN | DIASTOLIC BLOOD PRESSURE: 70 MMHG | HEART RATE: 67 BPM | RESPIRATION RATE: 16 BRPM | OXYGEN SATURATION: 99 % | SYSTOLIC BLOOD PRESSURE: 153 MMHG | WEIGHT: 220 LBS | TEMPERATURE: 96.8 F

## 2024-12-19 DIAGNOSIS — I26.94 MULTIPLE SUBSEGMENTAL PULMONARY EMBOLI WITHOUT ACUTE COR PULMONALE: ICD-10-CM

## 2024-12-19 DIAGNOSIS — I26.94 MULTIPLE SUBSEGMENTAL PULMONARY EMBOLI WITHOUT ACUTE COR PULMONALE: Primary | ICD-10-CM

## 2024-12-19 LAB
ALBUMIN SERPL-MCNC: 4 G/DL (ref 3.5–5.2)
ALBUMIN/GLOB SERPL: 1.5 G/DL
ALP SERPL-CCNC: 53 U/L (ref 39–117)
ALT SERPL W P-5'-P-CCNC: 20 U/L (ref 1–41)
ANION GAP SERPL CALCULATED.3IONS-SCNC: 11.6 MMOL/L (ref 5–15)
AST SERPL-CCNC: 17 U/L (ref 1–40)
BILIRUB SERPL-MCNC: 0.4 MG/DL (ref 0–1.2)
BUN SERPL-MCNC: 20 MG/DL (ref 8–23)
BUN/CREAT SERPL: 17.2 (ref 7–25)
CALCIUM SPEC-SCNC: 9.1 MG/DL (ref 8.6–10.5)
CHLORIDE SERPL-SCNC: 100 MMOL/L (ref 98–107)
CHOLEST SERPL-MCNC: 175 MG/DL (ref 0–200)
CO2 SERPL-SCNC: 23.4 MMOL/L (ref 22–29)
CREAT SERPL-MCNC: 1.16 MG/DL (ref 0.76–1.27)
DEPRECATED RDW RBC AUTO: 41.5 FL (ref 37–54)
EGFRCR SERPLBLD CKD-EPI 2021: 67.3 ML/MIN/1.73
ERYTHROCYTE [DISTWIDTH] IN BLOOD BY AUTOMATED COUNT: 13.3 % (ref 12.3–15.4)
GLOBULIN UR ELPH-MCNC: 2.7 GM/DL
GLUCOSE SERPL-MCNC: 89 MG/DL (ref 65–99)
HCT VFR BLD AUTO: 41.6 % (ref 37.5–51)
HDLC SERPL-MCNC: 43 MG/DL (ref 40–60)
HGB BLD-MCNC: 14.2 G/DL (ref 13–17.7)
LDLC SERPL CALC-MCNC: 103 MG/DL (ref 0–100)
LDLC/HDLC SERPL: 2.3 {RATIO}
MCH RBC QN AUTO: 29.3 PG (ref 26.6–33)
MCHC RBC AUTO-ENTMCNC: 34.1 G/DL (ref 31.5–35.7)
MCV RBC AUTO: 86 FL (ref 79–97)
PLATELET # BLD AUTO: 205 10*3/MM3 (ref 140–450)
PMV BLD AUTO: 10.9 FL (ref 6–12)
POTASSIUM SERPL-SCNC: 4 MMOL/L (ref 3.5–5.2)
PROT SERPL-MCNC: 6.7 G/DL (ref 6–8.5)
RBC # BLD AUTO: 4.84 10*6/MM3 (ref 4.14–5.8)
SODIUM SERPL-SCNC: 135 MMOL/L (ref 136–145)
TRIGL SERPL-MCNC: 165 MG/DL (ref 0–150)
TSH SERPL DL<=0.05 MIU/L-ACNC: 1.92 UIU/ML (ref 0.27–4.2)
VLDLC SERPL-MCNC: 29 MG/DL (ref 5–40)
WBC NRBC COR # BLD AUTO: 6.86 10*3/MM3 (ref 3.4–10.8)

## 2024-12-19 PROCEDURE — 80061 LIPID PANEL: CPT | Performed by: NURSE PRACTITIONER

## 2024-12-19 PROCEDURE — 1126F AMNT PAIN NOTED NONE PRSNT: CPT | Performed by: INTERNAL MEDICINE

## 2024-12-19 PROCEDURE — 36415 COLL VENOUS BLD VENIPUNCTURE: CPT

## 2024-12-19 PROCEDURE — 86147 CARDIOLIPIN ANTIBODY EA IG: CPT

## 2024-12-19 PROCEDURE — 85732 THROMBOPLASTIN TIME PARTIAL: CPT

## 2024-12-19 PROCEDURE — 85705 THROMBOPLASTIN INHIBITION: CPT

## 2024-12-19 PROCEDURE — 81241 F5 GENE: CPT

## 2024-12-19 PROCEDURE — 85613 RUSSELL VIPER VENOM DILUTED: CPT

## 2024-12-19 PROCEDURE — 85300 ANTITHROMBIN III ACTIVITY: CPT

## 2024-12-19 PROCEDURE — 86146 BETA-2 GLYCOPROTEIN ANTIBODY: CPT

## 2024-12-19 PROCEDURE — 85306 CLOT INHIBIT PROT S FREE: CPT

## 2024-12-19 PROCEDURE — 71275 CT ANGIOGRAPHY CHEST: CPT

## 2024-12-19 PROCEDURE — 85302 CLOT INHIBIT PROT C ANTIGEN: CPT

## 2024-12-19 PROCEDURE — 84443 ASSAY THYROID STIM HORMONE: CPT | Performed by: NURSE PRACTITIONER

## 2024-12-19 PROCEDURE — 25510000001 IOPAMIDOL 61 % SOLUTION: Performed by: INTERNAL MEDICINE

## 2024-12-19 PROCEDURE — 3078F DIAST BP <80 MM HG: CPT | Performed by: INTERNAL MEDICINE

## 2024-12-19 PROCEDURE — 81240 F2 GENE: CPT

## 2024-12-19 PROCEDURE — 85305 CLOT INHIBIT PROT S TOTAL: CPT

## 2024-12-19 PROCEDURE — 85027 COMPLETE CBC AUTOMATED: CPT | Performed by: NURSE PRACTITIONER

## 2024-12-19 PROCEDURE — 3077F SYST BP >= 140 MM HG: CPT | Performed by: INTERNAL MEDICINE

## 2024-12-19 PROCEDURE — 80053 COMPREHEN METABOLIC PANEL: CPT | Performed by: NURSE PRACTITIONER

## 2024-12-19 PROCEDURE — 99204 OFFICE O/P NEW MOD 45 MIN: CPT | Performed by: INTERNAL MEDICINE

## 2024-12-19 PROCEDURE — 85670 THROMBIN TIME PLASMA: CPT

## 2024-12-19 RX ORDER — IOPAMIDOL 612 MG/ML
100 INJECTION, SOLUTION INTRAVASCULAR
Status: COMPLETED | OUTPATIENT
Start: 2024-12-19 | End: 2024-12-19

## 2024-12-19 RX ADMIN — IOPAMIDOL 100 ML: 612 INJECTION, SOLUTION INTRAVENOUS at 15:31

## 2024-12-19 NOTE — PROGRESS NOTES
New Patient Office Visit      Date: 2024     Patient Name: Carmelo Arnold  MRN: [id number]  : 1955  Referring Physician: Hortensia Huynh    Chief Complaint: Establish care for history of pulmonary embolism    History of Present Illness: Carmelo Arnold is a pleasant 69 y.o. male with a past medical history of BPH, hypertension, seasonal allergies, hyperlipidemia, pulmonary embolism who presents today for evaluation of pulmonary embolism. The patient developed significant shortness of breath after returning from a trip to [place] in 2024.  He was seen in the ER where he had a CT scan which noted a pulmonary embolism with some slight right heart strain.  ECHO overall stable.  Started on apixaban and completed 3 months of therapy in 2024.  Of note, he had a lower extremity duplex in 2024 as well as 2024 which did not reveal any DVT.  He denies any family history of DVT or PE.  Denies any testosterone use.  Denies any recent COVID infection or vaccination.  Does note some slight shortness of breath over the past couple weeks while being off anticoagulation therapy.    Oncology History:    Oncology/Hematology History    No history exists.       Subjective      Review of Systems:     Constitutional: Negative for fevers, chills, or weight loss  Eyes: Negative for blurred vision or discharge         Ear/Nose/Throat: Negative for difficulty swallowing, sore throat, LAD                                                       Respiratory: Negative for cough, SOA, wheezing                                                                                        Cardiovascular: Negative for chest pain or palpitations                                                                  Gastrointestinal: Negative for nausea, vomiting or diarrhea                                                                     Genitourinary: Negative for dysuria or hematuria                                                                                            Musculoskeletal: Negative for any joint pains or muscle aches                                                                        Neurologic: Negative for any weakness, headaches, dizziness                                                                         Hematologic: Negative for any easy bleeding or bruising                                                                                   Psychiatric: Negative for anxiety or depression                             Past Medical History:   Past Medical History:   Diagnosis Date    Acid reflux     Asthma     Benign colon polyp 01/12/2021    Benign prostatic hyperplasia ?    Coronary artery disease involving native coronary artery of native heart with refractory angina pectoris 03/20/2023    followed by Dr. Mcqueen    Dyspnea on exertion     followed by Dr. Richar Rainey    Erectile dysfunction     Heart murmur     HL (hearing loss)     has hearing aids but doesn't always wear them    Primary hypertension 12/02/2022    Pulmonary embolism 08/2024    on eliquis    Snores        Past Surgical History:   Past Surgical History:   Procedure Laterality Date    CARDIAC CATHETERIZATION N/A 3/28/2023    Procedure: Coronary angiography;  Surgeon: Gabe Mcqueen MD;  Location:  [facility];  Service: Cardiology;  Laterality: N/A;    CARDIAC CATHETERIZATION N/A 4/13/2023    Procedure:  PCI of the LAD;  Surgeon: Kevin Cortez IV, MD;  Location:  [facility];  Service: Cardiovascular;  Laterality: N/A;    CARDIAC CATHETERIZATION N/A 4/13/2023    Procedure: Optical Coherent Tomography;  Surgeon: Kevin Cortez IV, MD;  Location:  [facility];  Service: Cardiovascular;  Laterality: N/A;    CARDIAC CATHETERIZATION N/A 4/13/2023    Procedure: Left Heart Cath;  Surgeon: Kevin Cortez IV, MD;  Location:  [facility];  Service: Cardiovascular;  Laterality: N/A;    COLONOSCOPY      COLONOSCOPY N/A 10/23/2024    Procedure: COLONOSCOPY WITH BIOPSY, POLYPECTOMY, STOOL SAMPLING;  Surgeon: Mike Esteban MD;  Location: [facility] ENDOSCOPY;  Service: Gastroenterology;  Laterality: N/A;    ENDOSCOPY N/A 10/23/2024    Procedure: Esophagogastroduodenoscopy with biopsy and polypectomy;  Surgeon: Mike Esteban MD;  Location: [facility] ENDOSCOPY;  Service: Gastroenterology;  Laterality: N/A;       Family History:   Family History   Problem Relation Age of Onset    Arthritis Mother     Breast cancer Sister     Migraines Daughter     Colon cancer Neg Hx        Social History:   Social History     Socioeconomic History    Marital status:    Tobacco Use    Smoking status: Never     Passive exposure: Past    Smokeless tobacco: Never   Vaping Use    Vaping status: Never Used   Substance and Sexual Activity    Alcohol use: Yes     Comment: rarely drink    Drug use: Never    Sexual activity: Not Currently     Partners: Female       Medications:     Current Outpatient Medications:     albuterol sulfate  (90 Base) MCG/ACT inhaler, 2 puffs., Disp: , Rfl:     Aspirin 81 MG capsule, Take 81 mg by mouth Daily., Disp: , Rfl:     azelastine (ASTELIN) 0.1 % nasal spray, Administer 1 spray into the nostril(s) as directed by provider 2 (Two) Times a Day As Needed for Rhinitis or Allergies. Use in each nostril as directed, Disp: 1 each, Rfl: 5    ciprofloxacin-dexAMETHasone (Ciprodex) 0.3-0.1 % otic suspension, Administer 4 drops into both ears 2 (Two) Times a Day for 7 days., Disp: 7.5 mL, Rfl: 0    finasteride (PROSCAR) 5 MG tablet, Take 1 tablet by mouth Daily., Disp: , Rfl:     levothyroxine (SYNTHROID, LEVOTHROID) 88 MCG tablet, Take 1 tablet by mouth Daily., Disp: , Rfl:     lisinopril-hydrochlorothiazide (PRINZIDE,ZESTORETIC) 10-12.5 MG per tablet, TAKE 1 TABLET BY MOUTH DAILY, Disp: 90 tablet, Rfl: 2     "loratadine (CLARITIN) 10 MG tablet, Take 1 tablet by mouth Daily., Disp: , Rfl:     metoprolol succinate XL (TOPROL-XL) 25 MG 24 hr tablet, TAKE 1 TABLET BY MOUTH DAILY, Disp: 90 tablet, Rfl: 3    pantoprazole (PROTONIX) 40 MG EC tablet, Take 1 tablet by mouth Daily. Indications: Esophagus Inflammation with Erosion, Disp: 30 tablet, Rfl: 5    rosuvastatin (CRESTOR) 10 MG tablet, Take 1 tablet by mouth Daily., Disp: 90 tablet, Rfl: 3    vitamin B-12 (CYANOCOBALAMIN) 1000 MCG tablet, Take 1 tablet by mouth Daily., Disp: , Rfl:     Allergies:   No Known Allergies    Objective     Physical Exam:  Vital Signs:   Vitals:    24 1431   BP: 153/70   Pulse: 67   Resp: 16   Temp: 96.8 °F (36 °C)   SpO2: 99%   Weight: 99.8 kg (220 lb)   Height: 188 cm (74.02\")   PainSc: 0-No pain     Pain Score    24 1431   PainSc: 0-No pain     ECOG Performance Status: 0 - Asymptomatic    Constitutional: NAD, ECOG 0  Eyes: PERRLA, scleral anicteric  ENT: No LAD, no thyromegaly  Respiratory: CTAB, no wheezing, rales, rhonchi  Cardiovascular: RRR, no murmurs, pulses 2+ bilaterally  Abdomen: soft, NT/ND, no HSM  Musculoskeletal: strength 5/5 bilaterally, no c/c/e  Neurologic: A&O x 3, CN II-XII intact grossly  Psych: mood and affect congruent, no SI or HI    Results Review:   No visits with results within 2 Week(s) from this visit.   Latest known visit with results is:   Admission on 10/23/2024, Discharged on 10/23/2024   Component Date Value Ref Range Status    Reference Lab Report 10/23/2024    Final                    Value:[facility]  [address] [place] [address]  Phone: [phone] or [phone]  Fax: [phone]  Matt Arreaga M.D., Medical Director    PATIENT NAME                           LABORATORY NO.  MAY CANALES.                     [id number]  [id number]                         AGE              SEX  SSN           CLIENT REF #  [place] [id number]  "     1955      [id number]   [id number]    [address] [place] [address] [facility]-PASS                REQUESTING M.D.     ATTENDING M.ANA MARÍA.     COPY TO.  [address]                        [place] [address]                 [facility]  DATE COLLECTED      DATE RECEIVED      DATE REPORTED  10/23/2024          10/23/2024         10/24/2024    DIAGNOSIS:  A.   ANTRUM AND BODY, BIOPSY:  Minimal to focally mild chronic inactive gastritis and mild reactive  epithelial changes  No Helicobacter microorganisms identified on H&E stain  No identified intestinal metaplasia, dysplasia or                           malignancy  B.   STOMACH, FUNDUS, POLYP:  Fundic gland polyp  No Helicobacter microorganisms identified on H&E stain  No identified intestinal metaplasia, dysplasia or malignancy  C.   GE JUNCTION, ABNORMAL MUCOSA:  Scant fragments of columnar mucosa only  No squamous mucosa identified  No identified intestinal metaplasia, dysplasia or malignancy, limited for  evaluation  D.   TERMINAL ILEUM BIOPSY:  Normal villous architecture without a significant increase in  intraepithelial lymphocytes  Prominent Peyer's patches and submucosal adipose tissue  No identified granulomas, dysplasia or malignancy  E.   ASCENDING COLON POLYP:  Tubular adenoma  No identified high-grade dysplasia or invasive malignancy  F.   RANDOM COLON BIOPSIES, ASCENDING, TRANSVERSE, AND DESCENDING:  No significant pathologic findings  No definitive features of chronicity  No identified active colitis, granulomas, dysplasia or malignancy  G.   SIGMOID COLON POLYP, X2:  Tubular adenomas  No identified high-grade                           dysplasia or invasive malignancy    CLINICAL HISTORY:  Personal history of colonic polyps, gastroesophageal reflux disease without  esophagitis      SPECIMENS RECEIVED:  A.  ANTRUM AND BODY, BIOPSY  B.  STOMACH, FUNDUS, POLYP  C.  GE JUNCTION , ABNORMAL MUCOSA  D.  TERMINAL ILEUM BIOPSY  E.  ASCENDING COLON POLYP  F.  RANDOM COLON  "BIOPSIES , ASCENDING, TRANSVERSE, AND DESCENDING  G.  SIGMOID COLON POLYP , X2    MICROSCOPIC DESCRIPTION:  A.  Tissue blocks are prepared and slides are examined microscopically on all  specimens. See diagnosis for details.  B.  Tissue blocks are prepared and slides are examined microscopically on all  specimens. See diagnosis for details.  C.  Tissue blocks are prepared and slides are examined microscopically on all  specimens. See diagnosis for details.  D.  Tissue blocks are prepared and slides are examined microscopically on all  specimens. See diagnosis for details.  E.  Tissue blocks are prepared and slides are examined microscopically on                           all  specimens. See diagnosis for details.  F.  Tissue blocks are prepared and slides are examined microscopically on all  specimens. See diagnosis for details.  G.  Tissue blocks are prepared and slides are examined microscopically on all  specimens. See diagnosis for details.    Professional interpretation rendered by Brook Dalton M.D. at [facility], [address] [place] [address].    GROSS DESCRIPTION:  A.  Labeled \"gastric antrum\" consisting of multiple pieces of tan soft tissue  measuring 0.8 x 0.3 x 0.2 cm in aggregate.  Submitted entirely 1 cassette.  BAW  B.  Labeled \"gastric fundus polyp\" consisting of 1 piece of tan soft tissue  measuring 0.4 x 0.2 x 0.2 cm.  Submitted entirely in 1 cassette.  C.  Labeled \"GE junction at abnormal mucosa\" consisting of 1 piece of tan  soft tissue measuring 0.4 x 0.2 x 0.1 cm.  Submitted entirely in 1 cassette.  D.  Labeled \"terminal ileum biopsy\" consisting of 2 pieces of tan soft tissue  measuring 0.4 x 0.2 x 0.2 cm in                           aggregate.  Submitted entirely in 1  cassette.  E.  Labeled \"ascending colon polyp\" consisting of 1 piece of tan soft tissue  measuring 1.1 x 0.2 x 0.2 cm.  Submitted entirely 1 cassette.  F.  Labeled \"random colon biopsies for ascending, transverse, " "descending  colon\" consisting of multiple pieces of tan soft tissue measuring 0.8 x 0.7 x  0.2 cm.  Submitted entirely in 1 cassette.  G.  Labeled \"sigmoid colon polyp\" consisting of 2 pieces of tan soft tissue  measuring 1.0 x 0.6 x 0.3 cm in aggregate.  Submitted entirely 1 cassette.    REVIEWED, DIAGNOSED AND ELECTRONICALLY  SIGNED BY:    Brook Dalton M.D.  CPT CODES:  88305x7      C Diff GDH Ag 10/23/2024 Negative  Negative Final    C.diff Toxin Ag 10/23/2024 Negative  Negative Final       US Venous Doppler Lower Extremity Bilateral (duplex)    Result Date: 12/9/2024  Narrative: BILATERAL LOWER EXTREMITY VENOUS DUPLEX DOPPLER EXAMINATION  HISTORY: Rule out DVT, lower extremity swelling.  COMPARISON:   None  PROCEDURE: Multiple transverse and longitudinal scans were performed of both femoropopliteal deep venous system, with augmentation and compression maneuvers.  FINDINGS: Proper phasic flow was noted in the visualized deep venous system. No intraluminal increased echogenicity is noted to suggest thrombus. There is proper compression and augmentation of the venous structures. No abnormal venous collaterals are seen.      Impression: No evidence of left or right lower extremity deep venous thrombosis.      This report was signed and finalized on 12/9/2024 12:55 PM by Rayne Parker MD.       Assessment / Plan      Assessment/Plan:   1. Multiple subsegmental pulmonary emboli without acute cor pulmonale (Primary)  -Likely provoked event in August 2024 due to recent long car ride  -Completed 3 months of anticoagulation in November 2024  -Will check hypercoagulable panel as below  -Will check CT angiogram given his symptoms as well as to check for clot resolution  -     Cancel: CBC & Differential; Future  -     Cancel: Comprehensive Metabolic Panel; Future  -     Anticardiolipin Antibody, IgG / M, Qn; Future  -     Antithrombin III; Future  -     Beta-2 Glycoprotein Antibodies; Future  -     Factor 5 Leiden; " Future  -     Lupus Anticoagulant; Future  -     Protein C & S Antigens; Future  -     Protein S Functional; Future  -     Factor II, DNA Analysis; Future  -     CT Angiogram Chest Pulmonary Embolism; Future           Follow Up:   Follow-up in 2 weeks     Jatinder Amezcua MD  Hematology and Oncology     Please note that portions of this note may have been completed with a voice recognition program. Efforts were made to edit the dictations, but occasionally words are mistranscribed.

## 2024-12-20 LAB
AT III PPP CHRO-ACNC: 108 % (ref 90–134)
F5 GENE MUT ANL BLD/T: NORMAL
FACTOR II, DNA ANALYSIS: NORMAL
PROT S ACT/NOR PPP: 108 % (ref 70–127)

## 2024-12-20 NOTE — PROGRESS NOTES
Sodium is a little low, make sure you are staying hydrated  Triglycerides are little elevated, this is typically from carbs and sugars in diet, eat a healthy balanced diet try to get regular physical activity.    Other labs okay

## 2024-12-21 LAB
CARDIOLIPIN IGG SER IA-ACNC: <9 GPL U/ML (ref 0–14)
CARDIOLIPIN IGM SER IA-ACNC: <9 MPL U/ML (ref 0–12)

## 2024-12-22 LAB
PROT C AG ACT/NOR PPP IA: 117 % (ref 60–150)
PROT S AG ACT/NOR PPP IA: 84 % (ref 60–150)
PROT S FREE AG ACT/NOR PPP IA: 131 % (ref 61–136)

## 2024-12-23 LAB
APTT SCREEN TO CONFIRM RATIO: 1.13 RATIO (ref 0–1.34)
B2 GLYCOPROT1 IGA SER-ACNC: <9 GPI IGA UNITS (ref 0–25)
B2 GLYCOPROT1 IGG SER-ACNC: <9 GPI IGG UNITS (ref 0–20)
B2 GLYCOPROT1 IGM SER-ACNC: <9 GPI IGM UNITS (ref 0–32)
CONFIRM APTT/NORMAL: 42.3 SEC (ref 0–47.6)
LA 2 SCREEN W REFLEX-IMP: NORMAL
SCREEN APTT: 32.6 SEC (ref 0–43.5)
SCREEN DRVVT: 45.7 SEC (ref 0–47)
THROMBIN TIME: 18.6 SEC (ref 0–23)

## 2025-01-02 ENCOUNTER — OFFICE VISIT (OUTPATIENT)
Dept: ONCOLOGY | Facility: CLINIC | Age: 70
End: 2025-01-02
Payer: MEDICARE

## 2025-01-02 VITALS
OXYGEN SATURATION: 99 % | RESPIRATION RATE: 16 BRPM | TEMPERATURE: 97.1 F | BODY MASS INDEX: 27.98 KG/M2 | SYSTOLIC BLOOD PRESSURE: 137 MMHG | HEART RATE: 76 BPM | WEIGHT: 218 LBS | DIASTOLIC BLOOD PRESSURE: 66 MMHG | HEIGHT: 74 IN

## 2025-01-02 DIAGNOSIS — I26.94 MULTIPLE SUBSEGMENTAL PULMONARY EMBOLI WITHOUT ACUTE COR PULMONALE: Primary | ICD-10-CM

## 2025-01-02 PROCEDURE — 1126F AMNT PAIN NOTED NONE PRSNT: CPT | Performed by: INTERNAL MEDICINE

## 2025-01-02 PROCEDURE — 3078F DIAST BP <80 MM HG: CPT | Performed by: INTERNAL MEDICINE

## 2025-01-02 PROCEDURE — 99214 OFFICE O/P EST MOD 30 MIN: CPT | Performed by: INTERNAL MEDICINE

## 2025-01-02 PROCEDURE — 3075F SYST BP GE 130 - 139MM HG: CPT | Performed by: INTERNAL MEDICINE

## 2025-01-02 NOTE — PROGRESS NOTES
Follow Up Office Visit      Date: 2025     Patient Name: Carmelo Arnold  MRN: [id number]  : 1955  Referring Physician: Hortensia Huynh     Chief Complaint: Follow-up for history of pulmonary embolism     History of Present Illness: Carmelo Arnold is a pleasant 69 y.o. male with a past medical history of BPH, hypertension, seasonal allergies, hyperlipidemia, pulmonary embolism who presents today for evaluation of pulmonary embolism. The patient developed significant shortness of breath after returning from a trip to [place] in 2024.  He was seen in the ER where he had a CT scan which noted a pulmonary embolism with some slight right heart strain.  ECHO overall stable.  Started on apixaban and completed 3 months of therapy in 2024.  Of note, he had a lower extremity duplex in 2024 as well as 2024 which did not reveal any DVT.  He denies any family history of DVT or PE.  Denies any testosterone use.  Denies any recent COVID infection or vaccination.  Does note some slight shortness of breath over the past couple weeks while being off anticoagulation therapy.    Interval History:  Presents to clinic for follow-up.  No major issues today.  Remains off blood thinners.  Denies any lower extremity swelling or discomfort.  Denies any chest pain or shortness of breath    Oncology History:    Oncology/Hematology History    No history exists.       Subjective      Review of Systems:   Constitutional: Negative for fevers, chills, or weight loss  Eyes: Negative for blurred vision or discharge         Ear/Nose/Throat: Negative for difficulty swallowing, sore throat, LAD                                                       Respiratory: Negative for cough, SOA, wheezing                                                                                        Cardiovascular: Negative for chest pain or palpitations                                                                   Gastrointestinal: Negative for nausea, vomiting or diarrhea                                                                     Genitourinary: Negative for dysuria or hematuria                                                                                           Musculoskeletal: Negative for any joint pains or muscle aches                                                                        Neurologic: Negative for any weakness, headaches, dizziness                                                                         Hematologic: Negative for any easy bleeding or bruising                                                                                   Psychiatric: Negative for anxiety or depression                          Past Medical History/Past Surgical History/ Family History/ Social History: Reviewed by me and unchanged from my previous documentation done on December 2024.     Medications:     Current Outpatient Medications:     albuterol sulfate  (90 Base) MCG/ACT inhaler, 2 puffs., Disp: , Rfl:     Aspirin 81 MG capsule, Take 81 mg by mouth Daily., Disp: , Rfl:     azelastine (ASTELIN) 0.1 % nasal spray, Administer 1 spray into the nostril(s) as directed by provider 2 (Two) Times a Day As Needed for Rhinitis or Allergies. Use in each nostril as directed, Disp: 1 each, Rfl: 5    finasteride (PROSCAR) 5 MG tablet, Take 1 tablet by mouth Daily., Disp: , Rfl:     levothyroxine (SYNTHROID, LEVOTHROID) 88 MCG tablet, Take 1 tablet by mouth Daily., Disp: , Rfl:     lisinopril-hydrochlorothiazide (PRINZIDE,ZESTORETIC) 10-12.5 MG per tablet, TAKE 1 TABLET BY MOUTH DAILY, Disp: 90 tablet, Rfl: 2    loratadine (CLARITIN) 10 MG tablet, Take 1 tablet by mouth Daily., Disp: , Rfl:     metoprolol succinate XL (TOPROL-XL) 25 MG 24 hr tablet, TAKE 1 TABLET BY MOUTH DAILY, Disp: 90 tablet, Rfl: 3    pantoprazole (PROTONIX) 40 MG EC tablet, Take 1 tablet by mouth Daily. Indications: Esophagus Inflammation  "with Erosion, Disp: 30 tablet, Rfl: 5    rosuvastatin (CRESTOR) 10 MG tablet, Take 1 tablet by mouth Daily., Disp: 90 tablet, Rfl: 3    vitamin B-12 (CYANOCOBALAMIN) 1000 MCG tablet, Take 1 tablet by mouth Daily., Disp: , Rfl:     Allergies:   No Known Allergies    Objective     Physical Exam:  Vital Signs:   Vitals:    01/02/25 0808   BP: 137/66   Pulse: 76   Resp: 16   Temp: 97.1 °F (36.2 °C)   SpO2: 99%   Weight: 98.9 kg (218 lb)   Height: 188 cm (74.02\")   PainSc: 0-No pain     Pain Score    01/02/25 0808   PainSc: 0-No pain     ECOG Performance Status: 0 - Asymptomatic    Constitutional: NAD, ECOG 0  Eyes: PERRLA, scleral anicteric  ENT: No LAD, no thyromegaly  Respiratory: CTAB, no wheezing, rales, rhonchi  Cardiovascular: RRR, no murmurs, pulses 2+ bilaterally  Abdomen: soft, NT/ND, no HSM  Musculoskeletal: strength 5/5 bilaterally, no c/c/e  Neurologic: A&O x 3, CN II-XII intact grossly    Results Review:   No visits with results within 2 Week(s) from this visit.   Latest known visit with results is:   Lab on 12/19/2024   Component Date Value Ref Range Status    Factor II, DNA Analysis 12/19/2024 Normal  Normal Final    Anticardiolipin IgG 12/19/2024 <9  0 - 14 GPL U/mL Final                              Negative:              <15                            Indeterminate:     15 - 20                            Low-Med Positive: >20 - 80                            High Positive:         >80    Anticardiolipin IgM 12/19/2024 <9  0 - 12 MPL U/mL Final                              Negative:              <13                            Indeterminate:     13 - 20                            Low-Med Positive: >20 - 80                            High Positive:         >80    Antithrombin Activity 12/19/2024 108  90 - 134 % Final    Beta-2 Glyco 1 IgG 12/19/2024 <9  0 - 20 GPI IgG units Final    The reference interval reflects a 3SD or 99th percentile interval,  which is thought to represent a potentially clinically " significant  result in accordance with the International Consensus Statement on  the classification criteria for definitive antiphospholipid syndrome  (APS). J Thromb Haem 2006;4:295-306.    Beta-2 Glyco 1 IgA 12/19/2024 <9  0 - 25 GPI IgA units Final    The reference interval reflects a 3SD or 99th percentile interval,  which is thought to represent a potentially clinically significant  result in accordance with the International Consensus Statement on  the classification criteria for definitive antiphospholipid syndrome  (APS). J Thromb Haem 2006;4:295-306.    Beta-2 Glyco 1 IgM 12/19/2024 <9  0 - 32 GPI IgM units Final    The reference interval reflects a 3SD or 99th percentile interval,  which is thought to represent a potentially clinically significant  result in accordance with the International Consensus Statement on  the classification criteria for definitive antiphospholipid syndrome  (APS). J Thromb Haem 2006;4:295-306.    Factor V Leiden 12/19/2024 Normal  Normal Final    Dilute Prothrombin Time(dPT) 12/19/2024 42.3  0.0 - 47.6 sec Final    dPT Confirm Ratio 12/19/2024 1.13  0.00 - 1.34 Ratio Final    Thrombin Time 12/19/2024 18.6  0.0 - 23.0 sec Final    PTT Lupus Anticoagulant 12/19/2024 32.6  0.0 - 43.5 sec Final    Dilute Viper Venom Time 12/19/2024 45.7  0.0 - 47.0 sec Final    Lupus Anticoagulant Reflex 12/19/2024 Comment:   Final    No lupus anticoagulant was detected.    Protein C Antigen 12/19/2024 117  60 - 150 % Final    Protein S Ag, Total 12/19/2024 84  60 - 150 % Final    This test was developed and its performance characteristics  determined by [employer]. It has not been cleared or approved  by the Food and Drug Administration.    Protein S Ag, Free 12/19/2024 131  61 - 136 % Final    Protein S Functional 12/19/2024 108  70 - 127 % Final       CT Angiogram Chest Pulmonary Embolism    Result Date: 12/19/2024  Narrative: PROCEDURE: CT ANGIOGRAM CHEST PULMONARY EMBOLISM-  HISTORY: check for clot  clearance; I26.94-Multiple subsegmental thrombotic pulmonary emboli without acute cor pulmonale  COMPARISON: None.  TECHNIQUE: The patient was injected with  IV contrast. Axial images were obtained through the chest in a CTA/ PE protocol. 3 D Reconstruction images were also performed. This study was performed with techniques to keep radiation doses as low as reasonably achievable, (ALARA). Individualized dose reduction techniques using automated exposure control or adjustment of mA and/or kV according to the patient size were employed.  FINDINGS: There is no axillary adenopathy. There is no hilar or mediastinal adenopathy.  The heart is upper limits of normal. There is evidence of old calcified granulomatous disease. There is no pericardial or pleural effusion. No filling defects are identified to suggest PE with particular attention paid to the lower lobes where the subsegmental pulmonary emboli were identified previously. No right heart strain identified.. Ascending aorta is again noted to be dilated to 44 mm, stable. One year follow-up recommended. Dissection cannot be excluded secondary to timing of the contrast bolus. Limited images of the upper abdomen again partially demonstrates a right renal cyst. No new suspicious infiltrate or nodule is identified. A 6 mm noncalcified left lower lobe pulmonary nodule is stable, series 5 image 213. There is pulmonary vascular congestion new from prior exam. There is also mild mosaic pattern to the pulmonary parenchyma bilaterally more prominent in the lower lobes, consider edema. There is evidence of old calcified granulomatous disease. Vascular calcifications noted.      Impression: Complete interval resolution of previously described lower lobe pulmonary emboli.  Borderline cardiomegaly with pulmonary vascular congestion and possible pulmonary edema, consider early CHF.  Stable 6 mm left lower lobe noncalcified pulmonary nodule; recommend 6-month follow-up per  Fleischner's criteria.  Stable dilatation of the ascending aorta to 44 mm, recommend 1 year follow-up to document stability.   CTDI: 4.78 mGy DLP:163.44 mGy.cm  This report was signed and finalized on 12/19/2024 3:58 PM by Rayne Parker MD.      US Venous Doppler Lower Extremity Bilateral (duplex)    Result Date: 12/9/2024  Narrative: BILATERAL LOWER EXTREMITY VENOUS DUPLEX DOPPLER EXAMINATION  HISTORY: Rule out DVT, lower extremity swelling.  COMPARISON:   None  PROCEDURE: Multiple transverse and longitudinal scans were performed of both femoropopliteal deep venous system, with augmentation and compression maneuvers.  FINDINGS: Proper phasic flow was noted in the visualized deep venous system. No intraluminal increased echogenicity is noted to suggest thrombus. There is proper compression and augmentation of the venous structures. No abnormal venous collaterals are seen.      Impression: No evidence of left or right lower extremity deep venous thrombosis.      This report was signed and finalized on 12/9/2024 12:55 PM by Rayne Parker MD.       Assessment / Plan      Assessment/Plan:   1. Multiple subsegmental pulmonary emboli without acute cor pulmonale (Primary)  -Likely provoked event in August 2024 due to recent long car ride  -Completed 3 months of anticoagulation in November 2024  -Hypercoagulable workup in December 2024-negative  -CT angiogram December 2024 with resolution of his pulmonary emboli but notable for 6 mm right lung nodule  -Okay to remain off anticoagulation at this time.  -Counseled patient on signs and symptoms of recurrent DVT/PE including but not limited to lower extremity pain, swelling, redness, cramping, shortness of breath, chest pain  -Should he have another DVT or PE, would recommend indefinite anticoagulation    2.  Right pulmonary nodule  -Noted on CT scan  -Chart review, he is followed with pulmonology in regards to this and has been stable since at least 2023         Follow Up:    Follow-up as needed     Jatindre Amezcua MD  Hematology and Oncology     Please note that portions of this note may have been completed with a voice recognition program. Efforts were made to edit the dictations, but occasionally words are mistranscribed.

## 2025-01-15 ENCOUNTER — HOSPITAL ENCOUNTER (OUTPATIENT)
Dept: MRI IMAGING | Facility: HOSPITAL | Age: 70
Discharge: HOME | End: 2025-01-15
Admitting: UROLOGY
Payer: MEDICARE

## 2025-01-15 DIAGNOSIS — R39.15 URGENCY OF URINATION: ICD-10-CM

## 2025-01-15 DIAGNOSIS — R39.9 LOWER URINARY TRACT SYMPTOMS: ICD-10-CM

## 2025-01-15 DIAGNOSIS — R97.20 ELEVATED PROSTATE SPECIFIC ANTIGEN (PSA): ICD-10-CM

## 2025-01-15 PROCEDURE — A9577 INJ MULTIHANCE: HCPCS | Performed by: UROLOGY

## 2025-01-15 PROCEDURE — 25510000002 GADOBENATE DIMEGLUMINE 529 MG/ML SOLUTION: Performed by: UROLOGY

## 2025-01-15 PROCEDURE — 72197 MRI PELVIS W/O & W/DYE: CPT

## 2025-01-15 RX ADMIN — GADOBENATE DIMEGLUMINE 20 ML: 529 INJECTION, SOLUTION INTRAVENOUS at 07:40

## 2025-01-21 DIAGNOSIS — I10 PRIMARY HYPERTENSION: ICD-10-CM

## 2025-01-21 RX ORDER — LISINOPRIL AND HYDROCHLOROTHIAZIDE 10; 12.5 MG/1; MG/1
1 TABLET ORAL DAILY
Qty: 90 TABLET | Refills: 2 | Status: SHIPPED | OUTPATIENT
Start: 2025-01-21

## 2025-02-04 ENCOUNTER — OFFICE VISIT (OUTPATIENT)
Dept: GASTROENTEROLOGY | Facility: CLINIC | Age: 70
End: 2025-02-04
Payer: MEDICARE

## 2025-02-04 VITALS
HEART RATE: 49 BPM | BODY MASS INDEX: 28.62 KG/M2 | WEIGHT: 223 LBS | OXYGEN SATURATION: 98 % | HEIGHT: 74 IN | DIASTOLIC BLOOD PRESSURE: 78 MMHG | SYSTOLIC BLOOD PRESSURE: 138 MMHG | TEMPERATURE: 97.1 F

## 2025-02-04 DIAGNOSIS — D12.6 ADENOMATOUS POLYP OF COLON, UNSPECIFIED PART OF COLON: ICD-10-CM

## 2025-02-04 DIAGNOSIS — K22.10 ULCER OF ESOPHAGUS WITHOUT BLEEDING: Primary | ICD-10-CM

## 2025-02-04 DIAGNOSIS — K22.10 EROSIVE ESOPHAGITIS: ICD-10-CM

## 2025-02-04 PROCEDURE — 1159F MED LIST DOCD IN RCRD: CPT | Performed by: INTERNAL MEDICINE

## 2025-02-04 PROCEDURE — 3078F DIAST BP <80 MM HG: CPT | Performed by: INTERNAL MEDICINE

## 2025-02-04 PROCEDURE — 3075F SYST BP GE 130 - 139MM HG: CPT | Performed by: INTERNAL MEDICINE

## 2025-02-04 PROCEDURE — 99214 OFFICE O/P EST MOD 30 MIN: CPT | Performed by: INTERNAL MEDICINE

## 2025-02-04 PROCEDURE — 1160F RVW MEDS BY RX/DR IN RCRD: CPT | Performed by: INTERNAL MEDICINE

## 2025-02-04 NOTE — PROGRESS NOTES
Follow Up Note     Date: 2025   Patient Name: Carmelo Arnold  MRN: [id number]  : 1955     Referring Physician: Hortensia Huynh APRN    Chief Complaint:    Chief Complaint   Patient presents with    Heartburn    abnormal CT    Procedure FUP       Interval History:   2025  Carmelo Arnold is a 69 y.o. male who is here today for follow up after his recent colonoscopy and EGD.  He states that since he was been started on a PPI there is no further acid reflux symptoms.  Denies any lower GI symptoms.  He states that he had a follow-up with hematology.    2024  Carmelo Arnold is a 69 y.o. male who is here today to establish care with Gastroenterology for to schedule EGD and surveillance colonoscopy.      Patient denies any abdominal pain, change in bowel habit, blood in the stool.  He does have intermittent mild reflux symptoms for which she takes Tums as needed.  He states that he gets it once in few weeks especially depending on what he eats.  No dysphagia.  His last EGD was many years ago details unknown.  Last colonoscopy was in  at [place] and had a adenomatous polyps removed.  No family history of any colon cancer or GI malignancy.  He had a recent PE and is concerned whether he has any occult neoplastic process.  He is a non-smoker and nonalcoholic.     He had a urinary retention while he was on vacation in 2024.  He had to have a Cordero catheter at outside facility.  Subsequently on 8/10/2024 he had to came to ED for leg edema suspicious for DVT which was negative.  However CT angio done revealed a subsegmental PE bilateral.  Venous Doppler done for lower extremity on 2024 did not reveal any lower extremity DVT.  CT abdomen pelvis with contrast for lower abdominal pain done on 8/10/2024 revealed a large renal cyst, colonic diverticulosis without diverticulitis and enlarged prostate indenting the bladder with a right lateral cyst measuring 1.9 cm  Left  superolateral prostate is hypodense and underlying neoplasia cannot be ruled out.  Recommended urology referral.  Patient subsequently had a urology OP visit at [facility] on 8/15/2024 and was seen by Dr. Jan Luz and awaiting follow-up in 3 months.     He is currently on aspirin and the Eliquis.  Eliquis was started in August after he was diagnosed with PE.    Subjective      Past Medical History:   Past Medical History:   Diagnosis Date    Acid reflux     Asthma     Benign colon polyp 01/12/2021    Benign prostatic hyperplasia ?    Coronary artery disease involving native coronary artery of native heart with refractory angina pectoris 03/20/2023    followed by Dr. Mcqueen    Dyspnea on exertion     followed by Dr. Mckeon, Dr. Mcqueen    Erectile dysfunction     Heart murmur     HL (hearing loss)     has hearing aids but doesn't always wear them    Hyperlipidemia     Primary hypertension 12/02/2022    Pulmonary embolism 08/2024    on eliquis    Snores      Past Surgical History:   Past Surgical History:   Procedure Laterality Date    CARDIAC CATHETERIZATION N/A 3/28/2023    Procedure: Coronary angiography;  Surgeon: Gabe Mcqueen MD;  Location:  [facility];  Service: Cardiology;  Laterality: N/A;    CARDIAC CATHETERIZATION N/A 4/13/2023    Procedure:  PCI of the LAD;  Surgeon: Kevin Cortez IV, MD;  Location:  [facility];  Service: Cardiovascular;  Laterality: N/A;    CARDIAC CATHETERIZATION N/A 4/13/2023    Procedure: Optical Coherent Tomography;  Surgeon: Kevin Cortez IV, MD;  Location:  [facility];  Service: Cardiovascular;  Laterality: N/A;    CARDIAC CATHETERIZATION N/A 4/13/2023    Procedure: Left Heart Cath;  Surgeon: Kevin Cortez IV, MD;  Location:  [facility];  Service: Cardiovascular;  Laterality: N/A;    COLONOSCOPY      COLONOSCOPY N/A 10/23/2024    Procedure: COLONOSCOPY WITH  BIOPSY, POLYPECTOMY, STOOL SAMPLING;  Surgeon: Mike Esteban MD;  Location: [facility] ENDOSCOPY;  Service: Gastroenterology;  Laterality: N/A;    ENDOSCOPY N/A 10/23/2024    Procedure: Esophagogastroduodenoscopy with biopsy and polypectomy;  Surgeon: Mike Esteban MD;  Location: [facility] ENDOSCOPY;  Service: Gastroenterology;  Laterality: N/A;       Family History:   Family History   Problem Relation Age of Onset    Arthritis Mother     Breast cancer Sister     Migraines Daughter     Colon cancer Neg Hx        Social History:   Social History     Socioeconomic History    Marital status:    Tobacco Use    Smoking status: Never     Passive exposure: Past    Smokeless tobacco: Never   Vaping Use    Vaping status: Never Used   Substance and Sexual Activity    Alcohol use: Yes     Comment: rarely drink    Drug use: Never    Sexual activity: Defer       Medications:     Current Outpatient Medications:     albuterol sulfate  (90 Base) MCG/ACT inhaler, 2 puffs., Disp: , Rfl:     Aspirin 81 MG capsule, Take 81 mg by mouth Daily., Disp: , Rfl:     azelastine (ASTELIN) 0.1 % nasal spray, Administer 1 spray into the nostril(s) as directed by provider 2 (Two) Times a Day As Needed for Rhinitis or Allergies. Use in each nostril as directed, Disp: 1 each, Rfl: 5    finasteride (PROSCAR) 5 MG tablet, Take 1 tablet by mouth Daily., Disp: , Rfl:     levothyroxine (SYNTHROID, LEVOTHROID) 88 MCG tablet, Take 1 tablet by mouth Daily., Disp: , Rfl:     lisinopril-hydrochlorothiazide (PRINZIDE,ZESTORETIC) 10-12.5 MG per tablet, TAKE 1 TABLET BY MOUTH DAILY, Disp: 90 tablet, Rfl: 2    loratadine (CLARITIN) 10 MG tablet, Take 1 tablet by mouth Daily., Disp: , Rfl:     metoprolol succinate XL (TOPROL-XL) 25 MG 24 hr tablet, TAKE 1 TABLET BY MOUTH DAILY, Disp: 90 tablet, Rfl: 3    pantoprazole (PROTONIX) 40 MG EC tablet, Take 1 tablet by mouth Daily. Indications: Esophagus Inflammation with Erosion, Disp: 30 tablet,  "Rfl: 5    rosuvastatin (CRESTOR) 10 MG tablet, Take 1 tablet by mouth Daily., Disp: 90 tablet, Rfl: 3    vitamin B-12 (CYANOCOBALAMIN) 1000 MCG tablet, Take 1 tablet by mouth Daily., Disp: , Rfl:     Allergies:   No Known Allergies    Review of Systems:   Review of Systems   Constitutional:  Positive for fatigue. Negative for appetite change, fever and unexpected weight loss.   HENT:  Negative for trouble swallowing.    Gastrointestinal:  Negative for abdominal distention, abdominal pain, anal bleeding, blood in stool, constipation, diarrhea, nausea, rectal pain, vomiting, GERD and indigestion.       The following portions of the patient's history were reviewed and updated as appropriate: allergies, current medications, past family history, past medical history, past social history, past surgical history and problem list.    Objective     Physical Exam:  Vital Signs:   Vitals:    02/04/25 1532   BP: 138/78   Pulse: (!) 49   Temp: 97.1 °F (36.2 °C)   TempSrc: Infrared   SpO2: 98%   Weight: 101 kg (223 lb)   Height: 188 cm (74\")       Physical Exam  Constitutional:       Appearance: Normal appearance.   HENT:      Head: Normocephalic and atraumatic.   Eyes:      Conjunctiva/sclera: Conjunctivae normal.   Abdominal:      General: Abdomen is flat. There is no distension.      Palpations: There is no mass.      Tenderness: There is no abdominal tenderness. There is no guarding or rebound.      Hernia: No hernia is present.   Musculoskeletal:      Cervical back: Normal range of motion and neck supple.   Neurological:      Mental Status: He is alert.         Results Review:   I reviewed the patient's new clinical results.    Lab on 12/19/2024   Component Date Value Ref Range Status    Factor II, DNA Analysis 12/19/2024 Normal  Normal Final    Anticardiolipin IgG 12/19/2024 <9  0 - 14 GPL U/mL Final                              Negative:              <15                            Indeterminate:     15 - 20                  "           Low-Med Positive: >20 - 80                            High Positive:         >80    Anticardiolipin IgM 12/19/2024 <9  0 - 12 MPL U/mL Final                              Negative:              <13                            Indeterminate:     13 - 20                            Low-Med Positive: >20 - 80                            High Positive:         >80    Antithrombin Activity 12/19/2024 108  90 - 134 % Final    Beta-2 Glyco 1 IgG 12/19/2024 <9  0 - 20 GPI IgG units Final    The reference interval reflects a 3SD or 99th percentile interval,  which is thought to represent a potentially clinically significant  result in accordance with the International Consensus Statement on  the classification criteria for definitive antiphospholipid syndrome  (APS). J Thromb Haem 2006;4:295-306.    Beta-2 Glyco 1 IgA 12/19/2024 <9  0 - 25 GPI IgA units Final    The reference interval reflects a 3SD or 99th percentile interval,  which is thought to represent a potentially clinically significant  result in accordance with the International Consensus Statement on  the classification criteria for definitive antiphospholipid syndrome  (APS). J Thromb Haem 2006;4:295-306.    Beta-2 Glyco 1 IgM 12/19/2024 <9  0 - 32 GPI IgM units Final    The reference interval reflects a 3SD or 99th percentile interval,  which is thought to represent a potentially clinically significant  result in accordance with the International Consensus Statement on  the classification criteria for definitive antiphospholipid syndrome  (APS). J Thromb Haem 2006;4:295-306.    Factor V Leiden 12/19/2024 Normal  Normal Final    Dilute Prothrombin Time(dPT) 12/19/2024 42.3  0.0 - 47.6 sec Final    dPT Confirm Ratio 12/19/2024 1.13  0.00 - 1.34 Ratio Final    Thrombin Time 12/19/2024 18.6  0.0 - 23.0 sec Final    PTT Lupus Anticoagulant 12/19/2024 32.6  0.0 - 43.5 sec Final    Dilute Viper Venom Time 12/19/2024 45.7  0.0 - 47.0 sec Final    Lupus  Anticoagulant Reflex 12/19/2024 Comment:   Final    No lupus anticoagulant was detected.    Protein C Antigen 12/19/2024 117  60 - 150 % Final    Protein S Ag, Total 12/19/2024 84  60 - 150 % Final    This test was developed and its performance characteristics  determined by [employer]. It has not been cleared or approved  by the Food and Drug Administration.    Protein S Ag, Free 12/19/2024 131  61 - 136 % Final    Protein S Functional 12/19/2024 108  70 - 127 % Final   Office Visit on 12/17/2024   Component Date Value Ref Range Status    WBC 12/19/2024 6.86  3.40 - 10.80 10*3/mm3 Final    RBC 12/19/2024 4.84  4.14 - 5.80 10*6/mm3 Final    Hemoglobin 12/19/2024 14.2  13.0 - 17.7 g/dL Final    Hematocrit 12/19/2024 41.6  37.5 - 51.0 % Final    MCV 12/19/2024 86.0  79.0 - 97.0 fL Final    MCH 12/19/2024 29.3  26.6 - 33.0 pg Final    MCHC 12/19/2024 34.1  31.5 - 35.7 g/dL Final    RDW 12/19/2024 13.3  12.3 - 15.4 % Final    RDW-SD 12/19/2024 41.5  37.0 - 54.0 fl Final    MPV 12/19/2024 10.9  6.0 - 12.0 fL Final    Platelets 12/19/2024 205  140 - 450 10*3/mm3 Final    Glucose 12/19/2024 89  65 - 99 mg/dL Final    BUN 12/19/2024 20  8 - 23 mg/dL Final    Creatinine 12/19/2024 1.16  0.76 - 1.27 mg/dL Final    Sodium 12/19/2024 135 (L)  136 - 145 mmol/L Final    Potassium 12/19/2024 4.0  3.5 - 5.2 mmol/L Final    Chloride 12/19/2024 100  98 - 107 mmol/L Final    CO2 12/19/2024 23.4  22.0 - 29.0 mmol/L Final    Calcium 12/19/2024 9.1  8.6 - 10.5 mg/dL Final    Total Protein 12/19/2024 6.7  6.0 - 8.5 g/dL Final    Albumin 12/19/2024 4.0  3.5 - 5.2 g/dL Final    ALT (SGPT) 12/19/2024 20  1 - 41 U/L Final    AST (SGOT) 12/19/2024 17  1 - 40 U/L Final    Alkaline Phosphatase 12/19/2024 53  39 - 117 U/L Final    Total Bilirubin 12/19/2024 0.4  0.0 - 1.2 mg/dL Final    Globulin 12/19/2024 2.7  gm/dL Final    A/G Ratio 12/19/2024 1.5  g/dL Final    BUN/Creatinine Ratio 12/19/2024 17.2  7.0 - 25.0 Final    Anion Gap 12/19/2024 11.6   5.0 - 15.0 mmol/L Final    eGFR 12/19/2024 67.3  >60.0 mL/min/1.73 Final    Total Cholesterol 12/19/2024 175  0 - 200 mg/dL Final    Triglycerides 12/19/2024 165 (H)  0 - 150 mg/dL Final    HDL Cholesterol 12/19/2024 43  40 - 60 mg/dL Final    LDL Cholesterol  12/19/2024 103 (H)  0 - 100 mg/dL Final    VLDL Cholesterol 12/19/2024 29  5 - 40 mg/dL Final    LDL/HDL Ratio 12/19/2024 2.30   Final    TSH 12/19/2024 1.920  0.270 - 4.200 uIU/mL Final      CT Angiogram Chest Pulmonary Embolism     Addendum Date: 8/10/2024    ADDENDUM REPORT ADDENDUM: This report was discussed with Dr. Cortez on Aug 10, 2024 22:29:00 EDT. Authenticated and Electronically Signed by Yaron Mathis MD on 08/10/2024 10:29:42 PM     Result Date: 8/10/2024  IMPRESSION: Bilateral lower lobe subsegmental pulmonary emboli. Possible right heart strain. Stable left lower lobe pulmonary nodule. Recommend follow-up chest CT in 6-12 months per Fleischner Society guidelines. See separate Abdomen/Pelvis CT report. Authenticated and Electronically Signed by Yaron Mathis MD on 08/10/2024 10:27:30 PM     CT Abdomen Pelvis With Contrast    CT Abdomen Pelvis With Contrast     Addendum Date: 8/10/2024    ADDENDUM REPORT ADDENDUM: Receipt of this report by the clinical staff was confirmed with Sarah Marlow RN on Aug 10, 2024 22:04:00 EDT. Authenticated and Electronically Signed by Yaron Mathis MD on 08/10/2024 10:04:36 PM     Result Date: 8/10/2024  IMPRESSION: No acute findings. Abnormal prostate. Underlying neoplasm cannot be excluded. Recommend urology referral if not worked up in the past. Nonemergent/incidental findings above. Authenticated and Electronically Signed by Yaron Mathis MD on 08/10/2024 09:51:12 PM       MRI Pelvis With & Without Contrast    Result Date: 1/15/2025  1. Severe prostate enlargement with mass effect on the posterior bladder base. 2. No significant bladder distention. 3. No pelvic adenopathy or extraprostatic mass.  This report was  signed and finalized on 1/15/2025 10:28 AM by Grady Frey MD.      CT Angiogram Chest Pulmonary Embolism    Result Date: 12/19/2024  Complete interval resolution of previously described lower lobe pulmonary emboli.  Borderline cardiomegaly with pulmonary vascular congestion and possible pulmonary edema, consider early CHF.  Stable 6 mm left lower lobe noncalcified pulmonary nodule; recommend 6-month follow-up per Fleischner's criteria.  Stable dilatation of the ascending aorta to 44 mm, recommend 1 year follow-up to document stability.   CTDI: 4.78 mGy DLP:163.44 mGy.cm  This report was signed and finalized on 12/19/2024 3:58 PM by Rayne Parker MD.      US Venous Doppler Lower Extremity Bilateral (duplex)    Result Date: 12/9/2024  No evidence of left or right lower extremity deep venous thrombosis.      This report was signed and finalized on 12/9/2024 12:55 PM by Rayne Parker MD.         EGD on 10/23/2024  - Normal oropharynx.   - Z-line irregular, 38 cm from the incisors.   - LA Grade B reflux esophagitis with no bleeding.   - Esophageal ulcers with no stigmata of recent bleeding.   - Granular mucosa in the esophagus at GEJ, likely iflammatory. Biopsied.   - 2 cm hiatal hernia.   - Erythematous mucosa in the antrum and prepyloric region of the stomach. Biopsied.   - A few gastric polyps. One resected and retrieved.   - Normal ampulla, duodenal bulb, first portion of the duodenum, second portion of the duodenum and third portion of the duodenum.    Colonoscopy on 10/23/2024    - One 4 mm polyp in the distal ascending colon, removed with a cold snare. Resected and retrieved.   - One 5 mm polyp in the proximal sigmoid colon, removed with a cold snare. Resected and retrieved.   - One 8 mm polyp in the mid sigmoid colon, removed with a hot snare. Resected and retrieved.   - Diverticulosis in the sigmoid colon and in the descending colon.   - The examined portion of the ileum was normal. Biopsied.   - Non-bleeding  external and internal hemorrhoids.   - Biopsies were taken with a cold forceps for histology in the descending colon, in the transverse colon and in the ascending colon.   - Some suspecion for c-diff colitis- stool sent for c-diff and biopsied - Smooth enlarged prostate palpable    A. ANTRUM AND BODY, BIOPSY:  Minimal to focally mild chronic inactive gastritis and mild reactive epithelial changes  No Helicobacter microorganisms identified on H&E stain  No identified intestinal metaplasia, dysplasia or malignancy  B. STOMACH, FUNDUS, POLYP:  Fundic gland polyp  No Helicobacter microorganisms identified on H&E stain  No identified intestinal metaplasia, dysplasia or malignancy  C. GE JUNCTION, ABNORMAL MUCOSA:  Scant fragments of columnar mucosa only  No squamous mucosa identified  No identified intestinal metaplasia, dysplasia or malignancy, limited for evaluation  D. TERMINAL ILEUM BIOPSY:  Normal villous architecture without a significant increase in intraepithelial lymphocytes  Prominent Peyer's patches and submucosal adipose tissue  No identified granulomas, dysplasia or malignancy  E. ASCENDING COLON POLYP:  Tubular adenoma  No identified high-grade dysplasia or invasive malignancy  F. RANDOM COLON BIOPSIES, ASCENDING, TRANSVERSE, AND DESCENDING:  No significant pathologic findings  No definitive features of chronicity  No identified active colitis, granulomas, dysplasia or malignancy  G. SIGMOID COLON POLYP, X2:  Tubular adenomas  No identified high-grade dysplasia or invasive malignancy    Assessment / Plan      1.  Adenomatous colon polyps  2.  Esophageal ulcers  3.  Erosive esophagitis  2/4/2025  Colonoscopy on 10/23/2025;  3 subcentimeter polyps removed which were tubular adenoma without any dysplasia.  EGD showed a severe erosive esophagitis and esophageal ulcers.  Biopsies benign.  Patient has been taking PPI for 3 months now with complete resolution of reflux symptoms.  Denies any dysphagia.    Given his  esophageal ulcers and severe esophagitis will repeat EGD to rule out Hernandez's  Continue Protonix 40 g p.o. daily and will change to low-dose after repeat EGD  Recommend surveillance colonoscopy in 3 years in October 2027    The indications, technique, alternatives and potential risk and complications were discussed with the patient including but not limited to bleeding, bowel perforations, missing lesions and anesthetic complications. The patient understands and wishes to proceed with the procedure and has given their verbal consent. Written patient education information was given to the patient.   The patient will call if they have further questions before procedure.      9/24/2024  Last colonoscopy was in 2015 and adenomatous polyps removed.  No family history of any colon cancer.  He is overdue for his surveillance colonoscopy will schedule. Due to his significant cardiac history we will keep him on aspirin but hold his Eliquis 2 days before the test.     4. Benign prostatic hyperplasia with weak urinary stream  5. Pulmonary embolism-aug 2024  Hypercoagulable workup in December 2024 was negative by hematology.  Likely provoked DVT from long car ride.  CT angio in December 2024 revealed resolution of the pulmonary emboli.  Venous Doppler lower extremity on 12/9/2024 was negative.  Okay to be off from anticoagulation for now per hematology.  MRI scan pelvis done on 1/25/2025 reveals again a severe enlarged prostate followed by urology.    Follow-up urology as planned        Follow Up:   No follow-ups on file.    Mike Esteban MD  [facility]enter[facility] [place]  2/4/2025  15:34 EST     Please note that portions of this note may have been completed with a voice recognition program.

## 2025-02-04 NOTE — H&P (VIEW-ONLY)
Follow Up Note     Date: 2025   Patient Name: Carmelo Arnold  MRN: [id number]  : 1955     Referring Physician: Hortensia Huynh APRN    Chief Complaint:    Chief Complaint   Patient presents with    Heartburn    abnormal CT    Procedure FUP       Interval History:   2025  Carmelo Arnold is a 69 y.o. male who is here today for follow up after his recent colonoscopy and EGD.  He states that since he was been started on a PPI there is no further acid reflux symptoms.  Denies any lower GI symptoms.  He states that he had a follow-up with hematology.    2024  Carmelo Arnold is a 69 y.o. male who is here today to establish care with Gastroenterology for to schedule EGD and surveillance colonoscopy.      Patient denies any abdominal pain, change in bowel habit, blood in the stool.  He does have intermittent mild reflux symptoms for which she takes Tums as needed.  He states that he gets it once in few weeks especially depending on what he eats.  No dysphagia.  His last EGD was many years ago details unknown.  Last colonoscopy was in  at [place] and had a adenomatous polyps removed.  No family history of any colon cancer or GI malignancy.  He had a recent PE and is concerned whether he has any occult neoplastic process.  He is a non-smoker and nonalcoholic.     He had a urinary retention while he was on vacation in 2024.  He had to have a Cordero catheter at outside facility.  Subsequently on 8/10/2024 he had to came to ED for leg edema suspicious for DVT which was negative.  However CT angio done revealed a subsegmental PE bilateral.  Venous Doppler done for lower extremity on 2024 did not reveal any lower extremity DVT.  CT abdomen pelvis with contrast for lower abdominal pain done on 8/10/2024 revealed a large renal cyst, colonic diverticulosis without diverticulitis and enlarged prostate indenting the bladder with a right lateral cyst measuring 1.9 cm  Left  superolateral prostate is hypodense and underlying neoplasia cannot be ruled out.  Recommended urology referral.  Patient subsequently had a urology OP visit at [facility] on 8/15/2024 and was seen by Dr. Jan Luz and awaiting follow-up in 3 months.     He is currently on aspirin and the Eliquis.  Eliquis was started in August after he was diagnosed with PE.    Subjective      Past Medical History:   Past Medical History:   Diagnosis Date    Acid reflux     Asthma     Benign colon polyp 01/12/2021    Benign prostatic hyperplasia ?    Coronary artery disease involving native coronary artery of native heart with refractory angina pectoris 03/20/2023    followed by Dr. Mcqueen    Dyspnea on exertion     followed by Dr. Mckeon, Dr. Mcqueen    Erectile dysfunction     Heart murmur     HL (hearing loss)     has hearing aids but doesn't always wear them    Hyperlipidemia     Primary hypertension 12/02/2022    Pulmonary embolism 08/2024    on eliquis    Snores      Past Surgical History:   Past Surgical History:   Procedure Laterality Date    CARDIAC CATHETERIZATION N/A 3/28/2023    Procedure: Coronary angiography;  Surgeon: Gabe Mcqueen MD;  Location:  [facility];  Service: Cardiology;  Laterality: N/A;    CARDIAC CATHETERIZATION N/A 4/13/2023    Procedure:  PCI of the LAD;  Surgeon: Kevin Cortez IV, MD;  Location:  [facility];  Service: Cardiovascular;  Laterality: N/A;    CARDIAC CATHETERIZATION N/A 4/13/2023    Procedure: Optical Coherent Tomography;  Surgeon: Kevin Cortez IV, MD;  Location:  [facility];  Service: Cardiovascular;  Laterality: N/A;    CARDIAC CATHETERIZATION N/A 4/13/2023    Procedure: Left Heart Cath;  Surgeon: Kevin Cortez IV, MD;  Location:  [facility];  Service: Cardiovascular;  Laterality: N/A;    COLONOSCOPY      COLONOSCOPY N/A 10/23/2024    Procedure: COLONOSCOPY WITH  BIOPSY, POLYPECTOMY, STOOL SAMPLING;  Surgeon: Mike Esteban MD;  Location: [facility] ENDOSCOPY;  Service: Gastroenterology;  Laterality: N/A;    ENDOSCOPY N/A 10/23/2024    Procedure: Esophagogastroduodenoscopy with biopsy and polypectomy;  Surgeon: Mike Esteban MD;  Location: [facility] ENDOSCOPY;  Service: Gastroenterology;  Laterality: N/A;       Family History:   Family History   Problem Relation Age of Onset    Arthritis Mother     Breast cancer Sister     Migraines Daughter     Colon cancer Neg Hx        Social History:   Social History     Socioeconomic History    Marital status:    Tobacco Use    Smoking status: Never     Passive exposure: Past    Smokeless tobacco: Never   Vaping Use    Vaping status: Never Used   Substance and Sexual Activity    Alcohol use: Yes     Comment: rarely drink    Drug use: Never    Sexual activity: Defer       Medications:     Current Outpatient Medications:     albuterol sulfate  (90 Base) MCG/ACT inhaler, 2 puffs., Disp: , Rfl:     Aspirin 81 MG capsule, Take 81 mg by mouth Daily., Disp: , Rfl:     azelastine (ASTELIN) 0.1 % nasal spray, Administer 1 spray into the nostril(s) as directed by provider 2 (Two) Times a Day As Needed for Rhinitis or Allergies. Use in each nostril as directed, Disp: 1 each, Rfl: 5    finasteride (PROSCAR) 5 MG tablet, Take 1 tablet by mouth Daily., Disp: , Rfl:     levothyroxine (SYNTHROID, LEVOTHROID) 88 MCG tablet, Take 1 tablet by mouth Daily., Disp: , Rfl:     lisinopril-hydrochlorothiazide (PRINZIDE,ZESTORETIC) 10-12.5 MG per tablet, TAKE 1 TABLET BY MOUTH DAILY, Disp: 90 tablet, Rfl: 2    loratadine (CLARITIN) 10 MG tablet, Take 1 tablet by mouth Daily., Disp: , Rfl:     metoprolol succinate XL (TOPROL-XL) 25 MG 24 hr tablet, TAKE 1 TABLET BY MOUTH DAILY, Disp: 90 tablet, Rfl: 3    pantoprazole (PROTONIX) 40 MG EC tablet, Take 1 tablet by mouth Daily. Indications: Esophagus Inflammation with Erosion, Disp: 30 tablet,  "Rfl: 5    rosuvastatin (CRESTOR) 10 MG tablet, Take 1 tablet by mouth Daily., Disp: 90 tablet, Rfl: 3    vitamin B-12 (CYANOCOBALAMIN) 1000 MCG tablet, Take 1 tablet by mouth Daily., Disp: , Rfl:     Allergies:   No Known Allergies    Review of Systems:   Review of Systems   Constitutional:  Positive for fatigue. Negative for appetite change, fever and unexpected weight loss.   HENT:  Negative for trouble swallowing.    Gastrointestinal:  Negative for abdominal distention, abdominal pain, anal bleeding, blood in stool, constipation, diarrhea, nausea, rectal pain, vomiting, GERD and indigestion.       The following portions of the patient's history were reviewed and updated as appropriate: allergies, current medications, past family history, past medical history, past social history, past surgical history and problem list.    Objective     Physical Exam:  Vital Signs:   Vitals:    02/04/25 1532   BP: 138/78   Pulse: (!) 49   Temp: 97.1 °F (36.2 °C)   TempSrc: Infrared   SpO2: 98%   Weight: 101 kg (223 lb)   Height: 188 cm (74\")       Physical Exam  Constitutional:       Appearance: Normal appearance.   HENT:      Head: Normocephalic and atraumatic.   Eyes:      Conjunctiva/sclera: Conjunctivae normal.   Abdominal:      General: Abdomen is flat. There is no distension.      Palpations: There is no mass.      Tenderness: There is no abdominal tenderness. There is no guarding or rebound.      Hernia: No hernia is present.   Musculoskeletal:      Cervical back: Normal range of motion and neck supple.   Neurological:      Mental Status: He is alert.         Results Review:   I reviewed the patient's new clinical results.    Lab on 12/19/2024   Component Date Value Ref Range Status    Factor II, DNA Analysis 12/19/2024 Normal  Normal Final    Anticardiolipin IgG 12/19/2024 <9  0 - 14 GPL U/mL Final                              Negative:              <15                            Indeterminate:     15 - 20                  "           Low-Med Positive: >20 - 80                            High Positive:         >80    Anticardiolipin IgM 12/19/2024 <9  0 - 12 MPL U/mL Final                              Negative:              <13                            Indeterminate:     13 - 20                            Low-Med Positive: >20 - 80                            High Positive:         >80    Antithrombin Activity 12/19/2024 108  90 - 134 % Final    Beta-2 Glyco 1 IgG 12/19/2024 <9  0 - 20 GPI IgG units Final    The reference interval reflects a 3SD or 99th percentile interval,  which is thought to represent a potentially clinically significant  result in accordance with the International Consensus Statement on  the classification criteria for definitive antiphospholipid syndrome  (APS). J Thromb Haem 2006;4:295-306.    Beta-2 Glyco 1 IgA 12/19/2024 <9  0 - 25 GPI IgA units Final    The reference interval reflects a 3SD or 99th percentile interval,  which is thought to represent a potentially clinically significant  result in accordance with the International Consensus Statement on  the classification criteria for definitive antiphospholipid syndrome  (APS). J Thromb Haem 2006;4:295-306.    Beta-2 Glyco 1 IgM 12/19/2024 <9  0 - 32 GPI IgM units Final    The reference interval reflects a 3SD or 99th percentile interval,  which is thought to represent a potentially clinically significant  result in accordance with the International Consensus Statement on  the classification criteria for definitive antiphospholipid syndrome  (APS). J Thromb Haem 2006;4:295-306.    Factor V Leiden 12/19/2024 Normal  Normal Final    Dilute Prothrombin Time(dPT) 12/19/2024 42.3  0.0 - 47.6 sec Final    dPT Confirm Ratio 12/19/2024 1.13  0.00 - 1.34 Ratio Final    Thrombin Time 12/19/2024 18.6  0.0 - 23.0 sec Final    PTT Lupus Anticoagulant 12/19/2024 32.6  0.0 - 43.5 sec Final    Dilute Viper Venom Time 12/19/2024 45.7  0.0 - 47.0 sec Final    Lupus  Anticoagulant Reflex 12/19/2024 Comment:   Final    No lupus anticoagulant was detected.    Protein C Antigen 12/19/2024 117  60 - 150 % Final    Protein S Ag, Total 12/19/2024 84  60 - 150 % Final    This test was developed and its performance characteristics  determined by [facility]. It has not been cleared or approved  by the Food and Drug Administration.    Protein S Ag, Free 12/19/2024 131  61 - 136 % Final    Protein S Functional 12/19/2024 108  70 - 127 % Final   Office Visit on 12/17/2024   Component Date Value Ref Range Status    WBC 12/19/2024 6.86  3.40 - 10.80 10*3/mm3 Final    RBC 12/19/2024 4.84  4.14 - 5.80 10*6/mm3 Final    Hemoglobin 12/19/2024 14.2  13.0 - 17.7 g/dL Final    Hematocrit 12/19/2024 41.6  37.5 - 51.0 % Final    MCV 12/19/2024 86.0  79.0 - 97.0 fL Final    MCH 12/19/2024 29.3  26.6 - 33.0 pg Final    MCHC 12/19/2024 34.1  31.5 - 35.7 g/dL Final    RDW 12/19/2024 13.3  12.3 - 15.4 % Final    RDW-SD 12/19/2024 41.5  37.0 - 54.0 fl Final    MPV 12/19/2024 10.9  6.0 - 12.0 fL Final    Platelets 12/19/2024 205  140 - 450 10*3/mm3 Final    Glucose 12/19/2024 89  65 - 99 mg/dL Final    BUN 12/19/2024 20  8 - 23 mg/dL Final    Creatinine 12/19/2024 1.16  0.76 - 1.27 mg/dL Final    Sodium 12/19/2024 135 (L)  136 - 145 mmol/L Final    Potassium 12/19/2024 4.0  3.5 - 5.2 mmol/L Final    Chloride 12/19/2024 100  98 - 107 mmol/L Final    CO2 12/19/2024 23.4  22.0 - 29.0 mmol/L Final    Calcium 12/19/2024 9.1  8.6 - 10.5 mg/dL Final    Total Protein 12/19/2024 6.7  6.0 - 8.5 g/dL Final    Albumin 12/19/2024 4.0  3.5 - 5.2 g/dL Final    ALT (SGPT) 12/19/2024 20  1 - 41 U/L Final    AST (SGOT) 12/19/2024 17  1 - 40 U/L Final    Alkaline Phosphatase 12/19/2024 53  39 - 117 U/L Final    Total Bilirubin 12/19/2024 0.4  0.0 - 1.2 mg/dL Final    Globulin 12/19/2024 2.7  gm/dL Final    A/G Ratio 12/19/2024 1.5  g/dL Final    BUN/Creatinine Ratio 12/19/2024 17.2  7.0 - 25.0 Final    Anion Gap 12/19/2024 11.6   5.0 - 15.0 mmol/L Final    eGFR 12/19/2024 67.3  >60.0 mL/min/1.73 Final    Total Cholesterol 12/19/2024 175  0 - 200 mg/dL Final    Triglycerides 12/19/2024 165 (H)  0 - 150 mg/dL Final    HDL Cholesterol 12/19/2024 43  40 - 60 mg/dL Final    LDL Cholesterol  12/19/2024 103 (H)  0 - 100 mg/dL Final    VLDL Cholesterol 12/19/2024 29  5 - 40 mg/dL Final    LDL/HDL Ratio 12/19/2024 2.30   Final    TSH 12/19/2024 1.920  0.270 - 4.200 uIU/mL Final      CT Angiogram Chest Pulmonary Embolism     Addendum Date: 8/10/2024    ADDENDUM REPORT ADDENDUM: This report was discussed with Dr. Cortez on Aug 10, 2024 22:29:00 EDT. Authenticated and Electronically Signed by Yaron Mathis MD on 08/10/2024 10:29:42 PM     Result Date: 8/10/2024  IMPRESSION: Bilateral lower lobe subsegmental pulmonary emboli. Possible right heart strain. Stable left lower lobe pulmonary nodule. Recommend follow-up chest CT in 6-12 months per Fleischner Society guidelines. See separate Abdomen/Pelvis CT report. Authenticated and Electronically Signed by Yaron Mathis MD on 08/10/2024 10:27:30 PM     CT Abdomen Pelvis With Contrast    CT Abdomen Pelvis With Contrast     Addendum Date: 8/10/2024    ADDENDUM REPORT ADDENDUM: Receipt of this report by the clinical staff was confirmed with Sarah Marlow RN on Aug 10, 2024 22:04:00 EDT. Authenticated and Electronically Signed by Yaron Mathis MD on 08/10/2024 10:04:36 PM     Result Date: 8/10/2024  IMPRESSION: No acute findings. Abnormal prostate. Underlying neoplasm cannot be excluded. Recommend urology referral if not worked up in the past. Nonemergent/incidental findings above. Authenticated and Electronically Signed by Yaron Mathis MD on 08/10/2024 09:51:12 PM       MRI Pelvis With & Without Contrast    Result Date: 1/15/2025  1. Severe prostate enlargement with mass effect on the posterior bladder base. 2. No significant bladder distention. 3. No pelvic adenopathy or extraprostatic mass.  This report was  signed and finalized on 1/15/2025 10:28 AM by Grady Frey MD.      CT Angiogram Chest Pulmonary Embolism    Result Date: 12/19/2024  Complete interval resolution of previously described lower lobe pulmonary emboli.  Borderline cardiomegaly with pulmonary vascular congestion and possible pulmonary edema, consider early CHF.  Stable 6 mm left lower lobe noncalcified pulmonary nodule; recommend 6-month follow-up per Fleischner's criteria.  Stable dilatation of the ascending aorta to 44 mm, recommend 1 year follow-up to document stability.   CTDI: 4.78 mGy DLP:163.44 mGy.cm  This report was signed and finalized on 12/19/2024 3:58 PM by Rayne Parker MD.      US Venous Doppler Lower Extremity Bilateral (duplex)    Result Date: 12/9/2024  No evidence of left or right lower extremity deep venous thrombosis.      This report was signed and finalized on 12/9/2024 12:55 PM by Rayne Parker MD.         EGD on 10/23/2024  - Normal oropharynx.   - Z-line irregular, 38 cm from the incisors.   - LA Grade B reflux esophagitis with no bleeding.   - Esophageal ulcers with no stigmata of recent bleeding.   - Granular mucosa in the esophagus at GEJ, likely iflammatory. Biopsied.   - 2 cm hiatal hernia.   - Erythematous mucosa in the antrum and prepyloric region of the stomach. Biopsied.   - A few gastric polyps. One resected and retrieved.   - Normal ampulla, duodenal bulb, first portion of the duodenum, second portion of the duodenum and third portion of the duodenum.    Colonoscopy on 10/23/2024    - One 4 mm polyp in the distal ascending colon, removed with a cold snare. Resected and retrieved.   - One 5 mm polyp in the proximal sigmoid colon, removed with a cold snare. Resected and retrieved.   - One 8 mm polyp in the mid sigmoid colon, removed with a hot snare. Resected and retrieved.   - Diverticulosis in the sigmoid colon and in the descending colon.   - The examined portion of the ileum was normal. Biopsied.   - Non-bleeding  external and internal hemorrhoids.   - Biopsies were taken with a cold forceps for histology in the descending colon, in the transverse colon and in the ascending colon.   - Some suspecion for c-diff colitis- stool sent for c-diff and biopsied - Smooth enlarged prostate palpable    A. ANTRUM AND BODY, BIOPSY:  Minimal to focally mild chronic inactive gastritis and mild reactive epithelial changes  No Helicobacter microorganisms identified on H&E stain  No identified intestinal metaplasia, dysplasia or malignancy  B. STOMACH, FUNDUS, POLYP:  Fundic gland polyp  No Helicobacter microorganisms identified on H&E stain  No identified intestinal metaplasia, dysplasia or malignancy  C. GE JUNCTION, ABNORMAL MUCOSA:  Scant fragments of columnar mucosa only  No squamous mucosa identified  No identified intestinal metaplasia, dysplasia or malignancy, limited for evaluation  D. TERMINAL ILEUM BIOPSY:  Normal villous architecture without a significant increase in intraepithelial lymphocytes  Prominent Peyer's patches and submucosal adipose tissue  No identified granulomas, dysplasia or malignancy  E. ASCENDING COLON POLYP:  Tubular adenoma  No identified high-grade dysplasia or invasive malignancy  F. RANDOM COLON BIOPSIES, ASCENDING, TRANSVERSE, AND DESCENDING:  No significant pathologic findings  No definitive features of chronicity  No identified active colitis, granulomas, dysplasia or malignancy  G. SIGMOID COLON POLYP, X2:  Tubular adenomas  No identified high-grade dysplasia or invasive malignancy    Assessment / Plan      1.  Adenomatous colon polyps  2.  Esophageal ulcers  3.  Erosive esophagitis  2/4/2025  Colonoscopy on 10/23/2025;  3 subcentimeter polyps removed which were tubular adenoma without any dysplasia.  EGD showed a severe erosive esophagitis and esophageal ulcers.  Biopsies benign.  Patient has been taking PPI for 3 months now with complete resolution of reflux symptoms.  Denies any dysphagia.    Given his  esophageal ulcers and severe esophagitis will repeat EGD to rule out Hernandez's  Continue Protonix 40 g p.o. daily and will change to low-dose after repeat EGD  Recommend surveillance colonoscopy in 3 years in October 2027    The indications, technique, alternatives and potential risk and complications were discussed with the patient including but not limited to bleeding, bowel perforations, missing lesions and anesthetic complications. The patient understands and wishes to proceed with the procedure and has given their verbal consent. Written patient education information was given to the patient.   The patient will call if they have further questions before procedure.      9/24/2024  Last colonoscopy was in 2015 and adenomatous polyps removed.  No family history of any colon cancer.  He is overdue for his surveillance colonoscopy will schedule. Due to his significant cardiac history we will keep him on aspirin but hold his Eliquis 2 days before the test.     4. Benign prostatic hyperplasia with weak urinary stream  5. Pulmonary embolism-aug 2024  Hypercoagulable workup in December 2024 was negative by hematology.  Likely provoked DVT from long car ride.  CT angio in December 2024 revealed resolution of the pulmonary emboli.  Venous Doppler lower extremity on 12/9/2024 was negative.  Okay to be off from anticoagulation for now per hematology.  MRI scan pelvis done on 1/25/2025 reveals again a severe enlarged prostate followed by urology.    Follow-up urology as planned        Follow Up:   No follow-ups on file.    Mike Esteban MD  Gastroenter[facility] [place]  2/4/2025  15:34 EST     Please note that portions of this note may have been completed with a voice recognition program.

## 2025-02-19 NOTE — PRE-PROCEDURE INSTRUCTIONS
PAT phone history completed with patient for upcoming procedure on 2/26/25.    PAT PASS reviewed with patient and he/she verbalized understanding of the following:     Do not eat or drink anything after midnight the night before procedure unless otherwise instructed by physician/surgeon's office, this includes no gum, candy, mints, tobacco products or e-cigarettes.  Do not shave the area to be operated on at least 48 hours prior to procedure.  Do not wear makeup, lotion, hair products, or nail polish.  Do not wear any jewelry and remove all piercings.  Do not wear any adhesive if you wear dentures.  Do not wear contacts; bring in glasses if needed.  Only take medications on the morning of procedure as instructed by PAT nurse per anesthesia guidelines or as instructed by physician's office.   If you are on any blood thinners reach out to the physician/surgeon's office for instructions on when/if they will need to be stopped prior to procedure.   Bring in picture ID and insurance card, advanced directive copies if applicable, CPAP/BIPAP/Inhalers if indicated morning of procedure, leave any other valuables at home.  Ensure you have arranged for someone to drive you home the day of your procedure and someone to care for you at home afterwards. It is recommended that you do not drive, drink alcohol, or make any major legal decisions for at least 24 hours after your procedure is complete.    Instructions given on hospital entrance and registration location.

## 2025-02-26 ENCOUNTER — ANESTHESIA (OUTPATIENT)
Dept: GASTROENTEROLOGY | Facility: HOSPITAL | Age: 70
End: 2025-02-26
Payer: MEDICARE

## 2025-02-26 ENCOUNTER — HOSPITAL ENCOUNTER (OUTPATIENT)
Facility: HOSPITAL | Age: 70
Setting detail: HOSPITAL OUTPATIENT SURGERY
Discharge: HOME | End: 2025-02-26
Attending: INTERNAL MEDICINE | Admitting: INTERNAL MEDICINE
Payer: MEDICARE

## 2025-02-26 ENCOUNTER — ANESTHESIA EVENT (OUTPATIENT)
Dept: GASTROENTEROLOGY | Facility: HOSPITAL | Age: 70
End: 2025-02-26
Payer: MEDICARE

## 2025-02-26 VITALS
OXYGEN SATURATION: 95 % | HEART RATE: 63 BPM | SYSTOLIC BLOOD PRESSURE: 108 MMHG | WEIGHT: 213 LBS | BODY MASS INDEX: 27.35 KG/M2 | DIASTOLIC BLOOD PRESSURE: 65 MMHG | TEMPERATURE: 97.7 F | RESPIRATION RATE: 13 BRPM

## 2025-02-26 DIAGNOSIS — K22.10 ULCER OF ESOPHAGUS WITHOUT BLEEDING: ICD-10-CM

## 2025-02-26 DIAGNOSIS — K22.10 EROSIVE ESOPHAGITIS: ICD-10-CM

## 2025-02-26 PROCEDURE — 43239 EGD BIOPSY SINGLE/MULTIPLE: CPT | Performed by: INTERNAL MEDICINE

## 2025-02-26 PROCEDURE — 25010000002 LIDOCAINE (CARDIAC): Performed by: NURSE ANESTHETIST, CERTIFIED REGISTERED

## 2025-02-26 PROCEDURE — 25010000002 GLYCOPYRROLATE PF 0.4 MG/2ML SOLUTION: Performed by: NURSE ANESTHETIST, CERTIFIED REGISTERED

## 2025-02-26 PROCEDURE — 25810000003 SODIUM CHLORIDE 0.9 % SOLUTION: Performed by: INTERNAL MEDICINE

## 2025-02-26 PROCEDURE — 25010000002 PROPOFOL 10 MG/ML EMULSION: Performed by: NURSE ANESTHETIST, CERTIFIED REGISTERED

## 2025-02-26 RX ORDER — PROPOFOL 10 MG/ML
VIAL (ML) INTRAVENOUS AS NEEDED
Status: DISCONTINUED | OUTPATIENT
Start: 2025-02-26 | End: 2025-02-26 | Stop reason: SURG

## 2025-02-26 RX ORDER — PANTOPRAZOLE SODIUM 20 MG/1
40 TABLET, DELAYED RELEASE ORAL DAILY
Qty: 90 TABLET | Refills: 3 | Status: SHIPPED | OUTPATIENT
Start: 2025-02-26

## 2025-02-26 RX ORDER — SODIUM CHLORIDE 9 MG/ML
50 INJECTION, SOLUTION INTRAVENOUS CONTINUOUS
Status: DISCONTINUED | OUTPATIENT
Start: 2025-02-26 | End: 2025-02-26 | Stop reason: HOSPADM

## 2025-02-26 RX ADMIN — SODIUM CHLORIDE 50 ML/HR: 9 INJECTION, SOLUTION INTRAVENOUS at 06:58

## 2025-02-26 RX ADMIN — PROPOFOL 100 MG: 10 INJECTION, EMULSION INTRAVENOUS at 07:46

## 2025-02-26 RX ADMIN — PROPOFOL 20 MG: 10 INJECTION, EMULSION INTRAVENOUS at 07:47

## 2025-02-26 RX ADMIN — GLYCOPYRROLATE 0.4 MCG: 0.2 INJECTION, SOLUTION INTRAMUSCULAR; INTRAVENOUS at 07:46

## 2025-02-26 RX ADMIN — PROPOFOL 20 MG: 10 INJECTION, EMULSION INTRAVENOUS at 07:50

## 2025-02-26 RX ADMIN — LIDOCAINE HYDROCHLORIDE 60 MG: 20 INJECTION, SOLUTION INTRAVENOUS at 07:46

## 2025-02-26 RX ADMIN — PROPOFOL 20 MG: 10 INJECTION, EMULSION INTRAVENOUS at 07:49

## 2025-02-26 RX ADMIN — PROPOFOL 20 MG: 10 INJECTION, EMULSION INTRAVENOUS at 07:48

## 2025-02-26 NOTE — ANESTHESIA PREPROCEDURE EVALUATION
Anesthesia Evaluation     Patient summary reviewed and Nursing notes reviewed   no history of anesthetic complications:   NPO Solid Status: > 8 hours  NPO Liquid Status: > 8 hours           Airway   Mallampati: II  TM distance: >3 FB  Neck ROM: full  no difficulty expected and Possible difficult intubation  Dental - normal exam     Pulmonary - normal exam   (+) pulmonary embolism, asthma,shortness of breath  Cardiovascular - normal exam  Exercise tolerance: good (4-7 METS)    (+) hypertension 2 medications or greater, valvular problems/murmurs murmur, CAD, angina, ROSS, hyperlipidemia      Neuro/Psych  (+) psychiatric history Depression  GI/Hepatic/Renal/Endo    (+) obesity, morbid obesity, GERD, PUD, thyroid problem hypothyroidism    Musculoskeletal (-) negative ROS    Abdominal    Substance History - negative use     OB/GYN negative ob/gyn ROS         Other - negative ROS       ROS/Med Hx Other:   Left ventricular systolic function is normal. Calculated left ventricular EF = 65.3% Left ventricular ejection fraction appears to be 61 - 65%. Left ventricular diastolic function was normal.  ·  Normal right ventricular size and function.  ·  Mild mitral valve regurgitation is present.  ·  Borderline dilation of the proximal aorta is present. Ascending aorta = 3.5 cm                     Anesthesia Plan    ASA 3     MAC     (Pt told that intravenous sedation will be used as the primary anesthetic along with local anesthesia if necessary. Every effort will be made to make sure the patient is comfortable.     The patient was told they may or may not have recall for the procedure. It was further explained that if the MAC was not adequate that a general anesthetic with either an LMA or endotracheal tube would be required.     Will proceed with the plan of care.)  intravenous induction     Anesthetic plan, risks, benefits, and alternatives have been provided, discussed and informed consent has been obtained with:  patient.    CODE STATUS:

## 2025-02-26 NOTE — DISCHARGE INSTRUCTIONS
Rest today  No pushing,pulling,tugging,heavy lifting, or strenuous activity   No major decision making,driving,or drinking alcoholic beverages for 24 hours due to the sedation you received  Always use good hand hygiene/washing technique  No driving on pain medication.    To assist you in voiding:  Drink plenty of fluids  Listen to running water while attempting to void.    If you are unable to urinate and you have an uncomfortable urge to void or it has been   6 hours since you were discharged, return to the Emergency Room    - Discharge patient to home (ambulatory).   - Resume previous diet.   - Follow an antireflux regimen.   - Await pathology results.   - Protonix 20mg po daily longterm  - Repeat EGD 3 yrs   - Return to my office in 1 year

## 2025-02-26 NOTE — ANESTHESIA POSTPROCEDURE EVALUATION
Patient: Carmelo Arnold    Procedure Summary       Date: 02/26/25 Room / Location: [facility] / [facility]    Anesthesia Start: 0731 Anesthesia Stop: 0753    Procedure: Esophagogastroduodenscopy with biopsy (Esophagus) Diagnosis:       Ulcer of esophagus without bleeding      Erosive esophagitis      (Ulcer of esophagus without bleeding [K22.10])      (Erosive esophagitis [K22.10])    Surgeons: Mike Esteban MD Provider: Carmelo Larson CRNA    Anesthesia Type: MAC ASA Status: 3            Anesthesia Type: MAC    Vitals  No vitals data found for the desired time range.          Post Anesthesia Care and Evaluation    Patient location during evaluation: bedside  Patient participation: complete - patient participated  Level of consciousness: awake and alert  Pain score: 0  Pain management: satisfactory to patient    Airway patency: patent  Anesthetic complications: No anesthetic complications  PONV Status: none  Cardiovascular status: acceptable and stable  Respiratory status: acceptable  Hydration status: acceptable    Comments: Vitals signs as noted in nursing documentation as per protocol.

## 2025-02-27 LAB — REF LAB TEST METHOD: NORMAL

## 2025-03-14 RX ORDER — LEVOTHYROXINE SODIUM 88 UG/1
88 TABLET ORAL DAILY
Qty: 90 TABLET | Refills: 3 | Status: SHIPPED | OUTPATIENT
Start: 2025-03-14

## 2025-03-14 RX ORDER — METOPROLOL SUCCINATE 25 MG/1
25 TABLET, EXTENDED RELEASE ORAL DAILY
Qty: 90 TABLET | Refills: 3 | Status: SHIPPED | OUTPATIENT
Start: 2025-03-14

## 2025-04-03 ENCOUNTER — OFFICE VISIT (OUTPATIENT)
Dept: PULMONOLOGY | Facility: CLINIC | Age: 70
End: 2025-04-03
Payer: MEDICARE

## 2025-04-03 VITALS
HEIGHT: 74 IN | HEART RATE: 73 BPM | WEIGHT: 220 LBS | RESPIRATION RATE: 16 BRPM | SYSTOLIC BLOOD PRESSURE: 134 MMHG | BODY MASS INDEX: 28.23 KG/M2 | DIASTOLIC BLOOD PRESSURE: 68 MMHG | OXYGEN SATURATION: 98 %

## 2025-04-03 DIAGNOSIS — Z86.711 HISTORY OF PULMONARY EMBOLISM: Primary | ICD-10-CM

## 2025-04-03 DIAGNOSIS — J45.30 MILD PERSISTENT ASTHMA WITHOUT COMPLICATION: ICD-10-CM

## 2025-04-03 DIAGNOSIS — R91.1 LUNG NODULE, SOLITARY: ICD-10-CM

## 2025-04-03 DIAGNOSIS — J30.89 OTHER ALLERGIC RHINITIS: ICD-10-CM

## 2025-04-03 RX ORDER — CETIRIZINE HYDROCHLORIDE 10 MG/1
10 TABLET ORAL DAILY
COMMUNITY

## 2025-04-03 NOTE — PROGRESS NOTES
"  Chief Complaint   Patient presents with    Breathing Problem    Follow-up       Subjective   Carmelo Arnold is a 69 y.o. male.   Patient was evaluated today for follow up of asthma, PE and allergic rhinitis.     Patient does not report any recent exacerbations requiring emergency room visits or hospitalizations.     Patient is compliant with pulmonary medicines, as prescribed.     he is using the rescue inhalers minimally.     Patient says that he has been using his nasal sprays on a seasonal basis and describes no significant ongoing issues other than occasional congestion.     He completed the DOAC for 3 months.       The following portions of the patient's history were reviewed and updated as appropriate: allergies, current medications, past family history, past medical history, past social history, and past surgical history.    Review of Systems   HENT:  Positive for postnasal drip and sneezing. Negative for sinus pressure and sore throat.    Respiratory:  Negative for cough, chest tightness, shortness of breath and wheezing.        Objective   Visit Vitals  /68   Pulse 73   Resp 16   Ht 188 cm (74.02\") Comment: pt reported   Wt 99.8 kg (220 lb)   SpO2 98%   BMI 28.23 kg/m²       BMI Readings from Last 8 Encounters:   04/03/25 28.23 kg/m²   02/19/25 27.35 kg/m²   02/04/25 28.63 kg/m²   01/02/25 27.98 kg/m²   12/26/24 26.96 kg/m²   12/19/24 28.23 kg/m²   12/17/24 28.50 kg/m²   12/09/24 27.60 kg/m²       Physical Exam  Vitals reviewed.   Constitutional:       Appearance: He is well-developed.   Neck:      Vascular: No JVD.   Pulmonary:      Effort: Pulmonary effort is normal. No respiratory distress.      Breath sounds: Normal breath sounds. No wheezing or rales.   Musculoskeletal:      Cervical back: Neck supple.      Comments: Gait was normal   Skin:     General: Skin is warm and dry.   Neurological:      Mental Status: He is alert and oriented to person, place, and time.           Assessment & Plan "   Diagnoses and all orders for this visit:    1. History of pulmonary embolism (Primary)    2. Other allergic rhinitis    3. Mild persistent asthma without complication    4. Lung nodule, solitary           Return if symptoms worsen or fail to improve.    DISCUSSION (if any):  Last CT scan results was reviewed in great detail with the patient.  Results for orders placed during the hospital encounter of 12/19/24    CT Angiogram Chest Pulmonary Embolism    Narrative  PROCEDURE: CT ANGIOGRAM CHEST PULMONARY EMBOLISM-    HISTORY: check for clot clearance; I26.94-Multiple subsegmental  thrombotic pulmonary emboli without acute cor pulmonale    COMPARISON: None.    TECHNIQUE: The patient was injected with  IV contrast. Axial images were  obtained through the chest in a CTA/ PE protocol. 3 D Reconstruction  images were also performed. This study was performed with techniques to  keep radiation doses as low as reasonably achievable, (ALARA).  Individualized dose reduction techniques using automated exposure  control or adjustment of mA and/or kV according to the patient size were  employed.    FINDINGS: There is no axillary adenopathy. There is no hilar or  mediastinal adenopathy.  The heart is upper limits of normal. There is  evidence of old calcified granulomatous disease. There is no pericardial  or pleural effusion. No filling defects are identified to suggest PE  with particular attention paid to the lower lobes where the subsegmental  pulmonary emboli were identified previously. No right heart strain  identified.. Ascending aorta is again noted to be dilated to 44 mm,  stable. One year follow-up recommended. Dissection cannot be excluded  secondary to timing of the contrast bolus. Limited images of the upper  abdomen again partially demonstrates a right renal cyst. No new  suspicious infiltrate or nodule is identified. A 6 mm noncalcified left  lower lobe pulmonary nodule is stable, series 5 image 213. There  is  pulmonary vascular congestion new from prior exam. There is also mild  mosaic pattern to the pulmonary parenchyma bilaterally more prominent in  the lower lobes, consider edema. There is evidence of old calcified  granulomatous disease. Vascular calcifications noted.    Impression  Complete interval resolution of previously described lower  lobe pulmonary emboli.    Borderline cardiomegaly with pulmonary vascular congestion and possible  pulmonary edema, consider early CHF.    Stable 6 mm left lower lobe noncalcified pulmonary nodule; recommend  6-month follow-up per Fleischner's criteria.    Stable dilatation of the ascending aorta to 44 mm, recommend 1 year  follow-up to document stability.      CTDI: 4.78 mGy  DLP:163.44 mGy.cm    This report was signed and finalized on 12/19/2024 3:58 PM by Rayne Parker MD.      Since his CT scan has been stable since Jan 2023, no further CTs are needed.    He has provoked PE and no further DOACs are needed.     Patient was advised to continue his nasal spray on a seasonal basis, since his symptoms are consistent with seasonal allergic rhinitis.    Patient was advised to use rescue inhaler for when necessary purposes    Patient was also advised to keep a log of the use of rescue inhaler.        Dictated utilizing Dragon dictation.    This document was electronically signed by Javan Mckeon MD on 04/03/25 at 11:27 EDT

## 2025-04-18 DIAGNOSIS — E78.5 HYPERLIPIDEMIA, UNSPECIFIED HYPERLIPIDEMIA TYPE: ICD-10-CM

## 2025-04-18 RX ORDER — ROSUVASTATIN CALCIUM 10 MG/1
10 TABLET, COATED ORAL DAILY
Qty: 90 TABLET | Refills: 3 | Status: SHIPPED | OUTPATIENT
Start: 2025-04-18

## 2025-05-26 ENCOUNTER — RESULTS FOLLOW-UP (OUTPATIENT)
Dept: ONCOLOGY | Facility: CLINIC | Age: 70
End: 2025-05-26
Payer: MEDICARE

## 2025-05-27 ENCOUNTER — HOSPITAL ENCOUNTER (OUTPATIENT)
Dept: CT IMAGING | Facility: HOSPITAL | Age: 70
Discharge: HOME | End: 2025-05-27
Admitting: INTERNAL MEDICINE
Payer: MEDICARE

## 2025-05-27 DIAGNOSIS — R06.02 SHORTNESS OF BREATH: ICD-10-CM

## 2025-05-27 DIAGNOSIS — R06.02 SHORTNESS OF BREATH: Primary | ICD-10-CM

## 2025-05-27 PROCEDURE — 71275 CT ANGIOGRAPHY CHEST: CPT

## 2025-05-27 PROCEDURE — 25510000001 IOPAMIDOL 61 % SOLUTION: Performed by: INTERNAL MEDICINE

## 2025-05-27 RX ORDER — IOPAMIDOL 612 MG/ML
100 INJECTION, SOLUTION INTRAVASCULAR
Status: COMPLETED | OUTPATIENT
Start: 2025-05-27 | End: 2025-05-27

## 2025-05-27 RX ADMIN — IOPAMIDOL 100 ML: 612 INJECTION, SOLUTION INTRAVENOUS at 15:16

## (undated) DEVICE — TREK CORONARY DILATATION CATHETER 3.50 MM X 15 MM / RAPID-EXCHANGE: Brand: TREK

## (undated) DEVICE — PERCLOSE PROGLIDE™ SUTURE-MEDIATED CLOSURE SYSTEM: Brand: PERCLOSE PROGLIDE™

## (undated) DEVICE — NC TREK CORONARY DILATATION CATHETER 3.0 MM X 15 MM / RAPID-EXCHANGE: Brand: NC TREK

## (undated) DEVICE — RUNTHROUGH NS EXTRA FLOPPY PTCA GUIDEWIRE: Brand: RUNTHROUGH

## (undated) DEVICE — Device

## (undated) DEVICE — GUIDE CATHETER: Brand: MACH1™

## (undated) DEVICE — Device: Brand: FIELDER XT

## (undated) DEVICE — ENDOSCOPY PORT CONNECTOR FOR OLYMPUS® SCOPES: Brand: ERBE

## (undated) DEVICE — TRAP,MUCUS SPECIMEN,40CC: Brand: MEDLINE

## (undated) DEVICE — Device: Brand: ASAHI SION BLUE

## (undated) DEVICE — DEV INFL MONARCH 25W

## (undated) DEVICE — CONMED SCOPE SAVER BITE BLOCK, 20X27 MM: Brand: SCOPE SAVER

## (undated) DEVICE — RADIFOCUS OPTITORQUE ANGIOGRAPHIC CATHETER: Brand: OPTITORQUE

## (undated) DEVICE — Device: Brand: VENTURE® RX CATHETER

## (undated) DEVICE — CVR PROB ULTRASND CIVFLEX GEN/PURP TELESCP/FOLD 5.5X58IN LF

## (undated) DEVICE — NC TREK CORONARY DILATATION CATHETER 4.5 MM X 12 MM / RAPID-EXCHANGE: Brand: NC TREK

## (undated) DEVICE — LHK- LEFT HEART KIT BAPTIST: Brand: MEDLINE INDUSTRIES, INC.

## (undated) DEVICE — GW CHOICE PT XSUP .014 182CM

## (undated) DEVICE — TR BAND RADIAL ARTERY COMPRESSION DEVICE: Brand: TR BAND

## (undated) DEVICE — HYBRID CO2 TUBING/CAP SET FOR OLYMPUS® SCOPES & CO2 SOURCE: Brand: ERBE

## (undated) DEVICE — VLV SXN AIR/H2O ORCAPOD3 1P/U STRL

## (undated) DEVICE — PK CATH CARD 10

## (undated) DEVICE — PINNACLE INTRODUCER SHEATH: Brand: PINNACLE

## (undated) DEVICE — GLIDESHEATH SLENDER STAINLESS STEEL KIT: Brand: GLIDESHEATH SLENDER

## (undated) DEVICE — CATH F6 ST JR 4 100CM: Brand: SUPERTORQUE

## (undated) DEVICE — Device: Brand: CORSAIR PRO

## (undated) DEVICE — TREK CORONARY DILATATION CATHETER 2.50 MM X 15 MM / RAPID-EXCHANGE: Brand: TREK

## (undated) DEVICE — NC TREK CORONARY DILATATION CATHETER 3.5 MM X 15 MM / RAPID-EXCHANGE: Brand: NC TREK

## (undated) DEVICE — LUBE JELLY PK/2.75GM STRL BX/144

## (undated) DEVICE — GW INQWIRE FC PTFE STD J/1.5 .035 260

## (undated) DEVICE — QUICK CATCH IN-LINE SUCTION POLYP TRAP IS USED FOR SUCTION RETRIEVAL OF ENDOSCOPICALLY REMOVED POLYPS.

## (undated) DEVICE — PATIENT RETURN ELECTRODE, SINGLE-USE, CONTACT QUALITY MONITORING, ADULT, WITH 15 FT (4.5 M) CORD. FOR PATIENTS WEIGHING OVER 33LBS. (15KG): Brand: MEGADYNE

## (undated) DEVICE — FRCP BX RADJAW4 NDL 2.8 240 STD OG

## (undated) DEVICE — ST ACC MICROPUNCTURE .018 ECHO/TRANSLSS/SS/TP 4F/10CM 21G

## (undated) DEVICE — INFLATION DEVICE KIT: Brand: ENCORE™ 26 ADVANTAGE KIT

## (undated) DEVICE — NC TREK CORONARY DILATATION CATHETER 4.0 MM X 12 MM / RAPID-EXCHANGE: Brand: NC TREK

## (undated) DEVICE — GW PERIPH GUIDERIGHT STD/EXCHNG/J/TIP SS 0.035IN 5X260CM

## (undated) DEVICE — DEV COMP RAD PRELUDESYNC 24CM

## (undated) DEVICE — KT CATH IMG DRAGONFLY OPTIS 2.7F 135CM

## (undated) DEVICE — SINGLE-USE POLYPECTOMY SNARE: Brand: CAPTIVATOR II

## (undated) DEVICE — GW EMR FIX EXCHG J STD .035 3MM 260CM

## (undated) DEVICE — COPILOT BLEEDBACK CONTROL VALVE: Brand: COPILOT

## (undated) DEVICE — MODEL AT P65, P/N 701554-001KIT CONTENTS: HAND CONTROLLER, 3-WAY HIGH-PRESSURE STOPCOCK WITH ROTATING END AND PREMIUM HIGH-PRESSURE TUBING: Brand: ANGIOTOUCH® KIT

## (undated) DEVICE — MODEL BT2000 P/N 700287-012KIT CONTENTS: MANIFOLD WITH SALINE AND CONTRAST PORTS, SALINE TUBING WITH SPIKE AND HAND SYRINGE, TRANSDUCER: Brand: BT2000 AUTOMATED MANIFOLD KIT

## (undated) DEVICE — ADULT, W/LG. BACK PAD, RADIOTRANSPARENT ELEMENT AND LEAD WIRE: Brand: DEFIBRILLATION ELECTRODES